# Patient Record
Sex: FEMALE | Race: WHITE | NOT HISPANIC OR LATINO | Employment: OTHER | URBAN - METROPOLITAN AREA
[De-identification: names, ages, dates, MRNs, and addresses within clinical notes are randomized per-mention and may not be internally consistent; named-entity substitution may affect disease eponyms.]

---

## 2018-08-13 ENCOUNTER — TELEPHONE (OUTPATIENT)
Dept: BARIATRICS/WEIGHT MGMT | Facility: CLINIC | Age: 73
End: 2018-08-13

## 2018-12-27 ENCOUNTER — CONVERSION ENCOUNTER (OUTPATIENT)
Dept: SURGERY | Facility: HOSPITAL | Age: 73
End: 2018-12-27

## 2019-01-13 ENCOUNTER — HOSPITAL ENCOUNTER (OUTPATIENT)
Dept: URGENT CARE | Facility: CLINIC | Age: 74
Discharge: HOME | End: 2019-01-13

## 2019-01-14 LAB — GLUCOSE BLD-MCNC: 94 MG/DL (ref 65–99)

## 2019-01-31 ENCOUNTER — HOSPITAL ENCOUNTER (OUTPATIENT)
Dept: OTHER | Facility: HOSPITAL | Age: 74
Setting detail: RECURRING SERIES
Discharge: HOME | End: 2019-04-25
Attending: NURSE PRACTITIONER

## 2019-03-21 ENCOUNTER — OFFICE VISIT CONVERTED (OUTPATIENT)
Dept: ORTHOPEDIC SURGERY | Facility: CLINIC | Age: 74
End: 2019-03-21
Attending: ORTHOPAEDIC SURGERY

## 2019-04-15 ENCOUNTER — HOSPITAL ENCOUNTER (OUTPATIENT)
Dept: OTHER | Facility: HOSPITAL | Age: 74
Discharge: HOME | End: 2019-04-15
Attending: INTERNAL MEDICINE

## 2019-04-15 LAB
ALBUMIN SERPL-MCNC: 4.2 G/DL (ref 3.5–5)
ALBUMIN/GLOB SERPL: 1.4 {RATIO} (ref 1.4–2.6)
ALP SERPL-CCNC: 96 U/L (ref 43–160)
ALT SERPL-CCNC: 22 U/L (ref 10–40)
ANION GAP SERPL CALC-SCNC: 20 MMOL/L (ref 8–19)
AST SERPL-CCNC: 40 U/L (ref 15–50)
BASOPHILS # BLD AUTO: 0.02 10*3/UL (ref 0–0.2)
BASOPHILS NFR BLD AUTO: 0.3 % (ref 0–3)
BILIRUB SERPL-MCNC: 0.19 MG/DL (ref 0.2–1.3)
BUN SERPL-MCNC: 13 MG/DL (ref 5–25)
BUN/CREAT SERPL: 15 {RATIO} (ref 6–20)
CALCIUM SERPL-MCNC: 9.6 MG/DL (ref 8.7–10.4)
CHLORIDE SERPL-SCNC: 97 MMOL/L (ref 99–111)
CHOLEST SERPL-MCNC: 187 MG/DL (ref 107–200)
CHOLEST/HDLC SERPL: 5.2 {RATIO} (ref 3–6)
CONV ABS IMM GRAN: 0.02 10*3/UL (ref 0–0.2)
CONV CO2: 29 MMOL/L (ref 22–32)
CONV CREATININE URINE, RANDOM: 103.1 MG/DL (ref 10–300)
CONV IMMATURE GRAN: 0.3 % (ref 0–1.8)
CONV MICROALBUM.,U,RANDOM: 287 MG/L (ref 0–20)
CONV TOTAL PROTEIN: 7.3 G/DL (ref 6.3–8.2)
CREAT UR-MCNC: 0.87 MG/DL (ref 0.5–0.9)
DEPRECATED RDW RBC AUTO: 48.4 FL (ref 36.4–46.3)
EOSINOPHIL # BLD AUTO: 0.17 10*3/UL (ref 0–0.7)
EOSINOPHIL # BLD AUTO: 2.4 % (ref 0–7)
ERYTHROCYTE [DISTWIDTH] IN BLOOD BY AUTOMATED COUNT: 14.9 % (ref 11.7–14.4)
EST. AVERAGE GLUCOSE BLD GHB EST-MCNC: 120 MG/DL
GFR SERPLBLD BASED ON 1.73 SQ M-ARVRAT: >60 ML/MIN/{1.73_M2}
GLOBULIN UR ELPH-MCNC: 3.1 G/DL (ref 2–3.5)
GLUCOSE SERPL-MCNC: 93 MG/DL (ref 65–99)
HBA1C MFR BLD: 13 G/DL (ref 12–16)
HBA1C MFR BLD: 5.8 % (ref 3.5–5.7)
HCT VFR BLD AUTO: 40.8 % (ref 37–47)
HDLC SERPL-MCNC: 36 MG/DL (ref 40–60)
LDLC SERPL CALC-MCNC: 121 MG/DL (ref 70–100)
LYMPHOCYTES # BLD AUTO: 2.36 10*3/UL (ref 1–5)
MCH RBC QN AUTO: 28.3 PG (ref 27–31)
MCHC RBC AUTO-ENTMCNC: 31.9 G/DL (ref 33–37)
MCV RBC AUTO: 88.9 FL (ref 81–99)
MICROALBUMIN/CREAT UR: 278.4 MG/G{CRE} (ref 0–35)
MONOCYTES # BLD AUTO: 0.72 10*3/UL (ref 0.2–1.2)
MONOCYTES NFR BLD AUTO: 10.2 % (ref 3–10)
NEUTROPHILS # BLD AUTO: 3.75 10*3/UL (ref 2–8)
NEUTROPHILS NFR BLD AUTO: 53.3 % (ref 30–85)
NRBC CBCN: 0 % (ref 0–0.7)
OSMOLALITY SERPL CALC.SUM OF ELEC: 292 MOSM/KG (ref 273–304)
PLATELET # BLD AUTO: 324 10*3/UL (ref 130–400)
PMV BLD AUTO: 9.7 FL (ref 9.4–12.3)
POTASSIUM SERPL-SCNC: 4.5 MMOL/L (ref 3.5–5.3)
RBC # BLD AUTO: 4.59 10*6/UL (ref 4.2–5.4)
SODIUM SERPL-SCNC: 141 MMOL/L (ref 135–147)
TRIGL SERPL-MCNC: 152 MG/DL (ref 40–150)
VARIANT LYMPHS NFR BLD MANUAL: 33.5 % (ref 20–45)
VLDLC SERPL-MCNC: 30 MG/DL (ref 5–37)
WBC # BLD AUTO: 7.04 10*3/UL (ref 4.8–10.8)

## 2019-04-23 ENCOUNTER — HOSPITAL ENCOUNTER (OUTPATIENT)
Dept: GENERAL RADIOLOGY | Facility: HOSPITAL | Age: 74
Discharge: HOME | End: 2019-04-23
Attending: INTERNAL MEDICINE

## 2019-06-11 ENCOUNTER — HOSPITAL ENCOUNTER (OUTPATIENT)
Dept: GENERAL RADIOLOGY | Facility: HOSPITAL | Age: 74
Discharge: HOME | End: 2019-06-11
Attending: INTERNAL MEDICINE

## 2019-06-24 ENCOUNTER — HOSPITAL ENCOUNTER (OUTPATIENT)
Dept: OTHER | Facility: HOSPITAL | Age: 74
Discharge: HOME | End: 2019-06-24
Attending: INTERNAL MEDICINE

## 2019-06-24 LAB
ALBUMIN SERPL-MCNC: 4.1 G/DL (ref 3.5–5)
ALBUMIN/GLOB SERPL: 1.3 {RATIO} (ref 1.4–2.6)
ALP SERPL-CCNC: 81 U/L (ref 43–160)
ALT SERPL-CCNC: 17 U/L (ref 10–40)
ANION GAP SERPL CALC-SCNC: 21 MMOL/L (ref 8–19)
APPEARANCE UR: ABNORMAL
AST SERPL-CCNC: 34 U/L (ref 15–50)
BASOPHILS # BLD AUTO: 0.03 10*3/UL (ref 0–0.2)
BASOPHILS NFR BLD AUTO: 0.5 % (ref 0–3)
BILIRUB SERPL-MCNC: 0.24 MG/DL (ref 0.2–1.3)
BILIRUB UR QL: NEGATIVE
BUN SERPL-MCNC: 12 MG/DL (ref 5–25)
BUN/CREAT SERPL: 13 {RATIO} (ref 6–20)
CALCIUM SERPL-MCNC: 9.6 MG/DL (ref 8.7–10.4)
CHLORIDE SERPL-SCNC: 102 MMOL/L (ref 99–111)
COLOR UR: YELLOW
CONV ABS IMM GRAN: 0.02 10*3/UL (ref 0–0.2)
CONV BACTERIA: ABNORMAL
CONV CO2: 23 MMOL/L (ref 22–32)
CONV COLLECTION SOURCE (UA): ABNORMAL
CONV IMMATURE GRAN: 0.3 % (ref 0–1.8)
CONV TOTAL PROTEIN: 7.2 G/DL (ref 6.3–8.2)
CONV UROBILINOGEN IN URINE BY AUTOMATED TEST STRIP: 0.2 {EHRLICHU}/DL (ref 0.1–1)
CREAT UR-MCNC: 0.92 MG/DL (ref 0.5–0.9)
DEPRECATED RDW RBC AUTO: 57.4 FL (ref 36.4–46.3)
EOSINOPHIL # BLD AUTO: 0.17 10*3/UL (ref 0–0.7)
EOSINOPHIL # BLD AUTO: 2.9 % (ref 0–7)
ERYTHROCYTE [DISTWIDTH] IN BLOOD BY AUTOMATED COUNT: 16.8 % (ref 11.7–14.4)
EST. AVERAGE GLUCOSE BLD GHB EST-MCNC: 114 MG/DL
GFR SERPLBLD BASED ON 1.73 SQ M-ARVRAT: >60 ML/MIN/{1.73_M2}
GLOBULIN UR ELPH-MCNC: 3.1 G/DL (ref 2–3.5)
GLUCOSE SERPL-MCNC: 81 MG/DL (ref 65–99)
GLUCOSE UR QL: NEGATIVE MG/DL
HBA1C MFR BLD: 12.7 G/DL (ref 12–16)
HBA1C MFR BLD: 5.6 % (ref 3.5–5.7)
HCT VFR BLD AUTO: 40.9 % (ref 37–47)
HGB UR QL STRIP: NEGATIVE
KETONES UR QL STRIP: ABNORMAL MG/DL
LEUKOCYTE ESTERASE UR QL STRIP: ABNORMAL
LYMPHOCYTES # BLD AUTO: 2.23 10*3/UL (ref 1–5)
MCH RBC QN AUTO: 28.5 PG (ref 27–31)
MCHC RBC AUTO-ENTMCNC: 31.1 G/DL (ref 33–37)
MCV RBC AUTO: 91.9 FL (ref 81–99)
MONOCYTES # BLD AUTO: 0.56 10*3/UL (ref 0.2–1.2)
MONOCYTES NFR BLD AUTO: 9.5 % (ref 3–10)
NEUTROPHILS # BLD AUTO: 2.88 10*3/UL (ref 2–8)
NEUTROPHILS NFR BLD AUTO: 48.9 % (ref 30–85)
NITRITE UR QL STRIP: POSITIVE
NRBC CBCN: 0 % (ref 0–0.7)
OSMOLALITY SERPL CALC.SUM OF ELEC: 293 MOSM/KG (ref 273–304)
PH UR STRIP.AUTO: 6 [PH] (ref 5–8)
PLATELET # BLD AUTO: 299 10*3/UL (ref 130–400)
PMV BLD AUTO: 10.4 FL (ref 9.4–12.3)
POTASSIUM SERPL-SCNC: 4.2 MMOL/L (ref 3.5–5.3)
PROT UR QL: NEGATIVE MG/DL
RBC # BLD AUTO: 4.45 10*6/UL (ref 4.2–5.4)
RBC #/AREA URNS HPF: ABNORMAL /[HPF]
SODIUM SERPL-SCNC: 142 MMOL/L (ref 135–147)
SP GR UR: 1.02 (ref 1–1.03)
SQUAMOUS SPT QL MICRO: ABNORMAL /[HPF]
VARIANT LYMPHS NFR BLD MANUAL: 37.9 % (ref 20–45)
WBC # BLD AUTO: 5.89 10*3/UL (ref 4.8–10.8)
WBC #/AREA URNS HPF: ABNORMAL /[HPF]

## 2019-06-27 LAB
AMOXICILLIN+CLAV SUSC ISLT: <=2
AMPICILLIN SUSC ISLT: 4
AMPICILLIN+SULBAC SUSC ISLT: <=2
BACTERIA UR CULT: ABNORMAL
CEFAZOLIN SUSC ISLT: <=4
CEFEPIME SUSC ISLT: <=1
CEFTAZIDIME SUSC ISLT: <=1
CEFTRIAXONE SUSC ISLT: <=1
CEFUROXIME ORAL SUSC ISLT: 4
CEFUROXIME PARENTER SUSC ISLT: 4
CIPROFLOXACIN SUSC ISLT: <=0.25
ERTAPENEM SUSC ISLT: <=0.5
GENTAMICIN SUSC ISLT: <=1
LEVOFLOXACIN SUSC ISLT: <=0.12
NITROFURANTOIN SUSC ISLT: <=16
TETRACYCLINE SUSC ISLT: <=1
TMP SMX SUSC ISLT: <=20
TOBRAMYCIN SUSC ISLT: <=1

## 2019-08-27 ENCOUNTER — HOSPITAL ENCOUNTER (OUTPATIENT)
Dept: OTHER | Facility: HOSPITAL | Age: 74
Discharge: HOME | End: 2019-08-27
Attending: INTERNAL MEDICINE

## 2019-08-27 LAB
ALBUMIN SERPL-MCNC: 4.4 G/DL (ref 3.5–5)
ALBUMIN/GLOB SERPL: 1.8 {RATIO} (ref 1.4–2.6)
ALP SERPL-CCNC: 68 U/L (ref 43–160)
ALT SERPL-CCNC: 15 U/L (ref 10–40)
ANION GAP SERPL CALC-SCNC: 20 MMOL/L (ref 8–19)
AST SERPL-CCNC: 31 U/L (ref 15–50)
BASOPHILS # BLD AUTO: 0.03 10*3/UL (ref 0–0.2)
BASOPHILS NFR BLD AUTO: 0.4 % (ref 0–3)
BILIRUB SERPL-MCNC: 0.33 MG/DL (ref 0.2–1.3)
BUN SERPL-MCNC: 13 MG/DL (ref 5–25)
BUN/CREAT SERPL: 15 {RATIO} (ref 6–20)
CALCIUM SERPL-MCNC: 9.2 MG/DL (ref 8.7–10.4)
CHLORIDE SERPL-SCNC: 103 MMOL/L (ref 99–111)
CONV ABS IMM GRAN: 0.02 10*3/UL (ref 0–0.2)
CONV CO2: 25 MMOL/L (ref 22–32)
CONV IMMATURE GRAN: 0.3 % (ref 0–1.8)
CONV TOTAL PROTEIN: 6.9 G/DL (ref 6.3–8.2)
CREAT UR-MCNC: 0.86 MG/DL (ref 0.5–0.9)
DEPRECATED RDW RBC AUTO: 46.6 FL (ref 36.4–46.3)
EOSINOPHIL # BLD AUTO: 0.16 10*3/UL (ref 0–0.7)
EOSINOPHIL # BLD AUTO: 2.2 % (ref 0–7)
ERYTHROCYTE [DISTWIDTH] IN BLOOD BY AUTOMATED COUNT: 13.8 % (ref 11.7–14.4)
EST. AVERAGE GLUCOSE BLD GHB EST-MCNC: 108 MG/DL
GFR SERPLBLD BASED ON 1.73 SQ M-ARVRAT: >60 ML/MIN/{1.73_M2}
GLOBULIN UR ELPH-MCNC: 2.5 G/DL (ref 2–3.5)
GLUCOSE SERPL-MCNC: 95 MG/DL (ref 65–99)
HBA1C MFR BLD: 5.4 % (ref 3.5–5.7)
HCT VFR BLD AUTO: 42.1 % (ref 37–47)
HGB BLD-MCNC: 13.8 G/DL (ref 12–16)
LYMPHOCYTES # BLD AUTO: 2.53 10*3/UL (ref 1–5)
LYMPHOCYTES NFR BLD AUTO: 34.8 % (ref 20–45)
MCH RBC QN AUTO: 30.5 PG (ref 27–31)
MCHC RBC AUTO-ENTMCNC: 32.8 G/DL (ref 33–37)
MCV RBC AUTO: 93.1 FL (ref 81–99)
MONOCYTES # BLD AUTO: 0.66 10*3/UL (ref 0.2–1.2)
MONOCYTES NFR BLD AUTO: 9.1 % (ref 3–10)
NEUTROPHILS # BLD AUTO: 3.88 10*3/UL (ref 2–8)
NEUTROPHILS NFR BLD AUTO: 53.2 % (ref 30–85)
NRBC CBCN: 0 % (ref 0–0.7)
OSMOLALITY SERPL CALC.SUM OF ELEC: 298 MOSM/KG (ref 273–304)
PLATELET # BLD AUTO: 278 10*3/UL (ref 130–400)
PMV BLD AUTO: 9.6 FL (ref 9.4–12.3)
POTASSIUM SERPL-SCNC: 4.1 MMOL/L (ref 3.5–5.3)
RBC # BLD AUTO: 4.52 10*6/UL (ref 4.2–5.4)
SODIUM SERPL-SCNC: 144 MMOL/L (ref 135–147)
WBC # BLD AUTO: 7.28 10*3/UL (ref 4.8–10.8)

## 2019-10-10 ENCOUNTER — HOSPITAL ENCOUNTER (OUTPATIENT)
Dept: GENERAL RADIOLOGY | Facility: HOSPITAL | Age: 74
Discharge: HOME | End: 2019-10-10
Attending: OTOLARYNGOLOGY

## 2019-10-10 LAB
ANION GAP SERPL CALC-SCNC: 19 MMOL/L (ref 8–19)
APTT BLD: 22.4 S (ref 22.2–34.2)
BASOPHILS # BLD AUTO: 0.02 10*3/UL (ref 0–0.2)
BASOPHILS NFR BLD AUTO: 0.3 % (ref 0–3)
BUN SERPL-MCNC: 14 MG/DL (ref 5–25)
BUN/CREAT SERPL: 15 {RATIO} (ref 6–20)
CALCIUM SERPL-MCNC: 9.6 MG/DL (ref 8.7–10.4)
CHLORIDE SERPL-SCNC: 102 MMOL/L (ref 99–111)
CONV ABS IMM GRAN: 0.03 10*3/UL (ref 0–0.2)
CONV CO2: 26 MMOL/L (ref 22–32)
CONV IMMATURE GRAN: 0.4 % (ref 0–1.8)
CREAT UR-MCNC: 0.92 MG/DL (ref 0.5–0.9)
DEPRECATED RDW RBC AUTO: 43.3 FL (ref 36.4–46.3)
EOSINOPHIL # BLD AUTO: 0.16 10*3/UL (ref 0–0.7)
EOSINOPHIL # BLD AUTO: 2.3 % (ref 0–7)
ERYTHROCYTE [DISTWIDTH] IN BLOOD BY AUTOMATED COUNT: 12.6 % (ref 11.7–14.4)
GFR SERPLBLD BASED ON 1.73 SQ M-ARVRAT: >60 ML/MIN/{1.73_M2}
GLUCOSE SERPL-MCNC: 95 MG/DL (ref 65–99)
HCT VFR BLD AUTO: 40.8 % (ref 37–47)
HGB BLD-MCNC: 13.6 G/DL (ref 12–16)
INR PPP: 0.95 (ref 2–3)
LYMPHOCYTES # BLD AUTO: 2.36 10*3/UL (ref 1–5)
LYMPHOCYTES NFR BLD AUTO: 34.2 % (ref 20–45)
MCH RBC QN AUTO: 31.1 PG (ref 27–31)
MCHC RBC AUTO-ENTMCNC: 33.3 G/DL (ref 33–37)
MCV RBC AUTO: 93.2 FL (ref 81–99)
MONOCYTES # BLD AUTO: 0.66 10*3/UL (ref 0.2–1.2)
MONOCYTES NFR BLD AUTO: 9.6 % (ref 3–10)
NEUTROPHILS # BLD AUTO: 3.68 10*3/UL (ref 2–8)
NEUTROPHILS NFR BLD AUTO: 53.2 % (ref 30–85)
NRBC CBCN: 0 % (ref 0–0.7)
OSMOLALITY SERPL CALC.SUM OF ELEC: 296 MOSM/KG (ref 273–304)
PLATELET # BLD AUTO: 261 10*3/UL (ref 130–400)
PMV BLD AUTO: 9.4 FL (ref 9.4–12.3)
POTASSIUM SERPL-SCNC: 3.8 MMOL/L (ref 3.5–5.3)
PROTHROMBIN TIME: 10.3 S (ref 9.4–12)
RBC # BLD AUTO: 4.38 10*6/UL (ref 4.2–5.4)
SODIUM SERPL-SCNC: 143 MMOL/L (ref 135–147)
WBC # BLD AUTO: 6.91 10*3/UL (ref 4.8–10.8)

## 2019-10-15 ENCOUNTER — HOSPITAL ENCOUNTER (OUTPATIENT)
Dept: PERIOP | Facility: HOSPITAL | Age: 74
Setting detail: HOSPITAL OUTPATIENT SURGERY
Discharge: HOME | End: 2019-10-15
Attending: OTOLARYNGOLOGY

## 2019-10-15 LAB
GLUCOSE BLD-MCNC: 106 MG/DL (ref 65–99)
GLUCOSE BLD-MCNC: 83 MG/DL (ref 65–99)

## 2019-11-29 ENCOUNTER — HOSPITAL ENCOUNTER (OUTPATIENT)
Dept: OTHER | Facility: HOSPITAL | Age: 74
Discharge: HOME | End: 2019-11-29
Attending: INTERNAL MEDICINE

## 2019-11-29 LAB
ALBUMIN SERPL-MCNC: 4.3 G/DL (ref 3.5–5)
ALBUMIN/GLOB SERPL: 1.5 {RATIO} (ref 1.4–2.6)
ALP SERPL-CCNC: 75 U/L (ref 43–160)
ALT SERPL-CCNC: 10 U/L (ref 10–40)
ANION GAP SERPL CALC-SCNC: 18 MMOL/L (ref 8–19)
AST SERPL-CCNC: 28 U/L (ref 15–50)
BASOPHILS # BLD AUTO: 0.03 10*3/UL (ref 0–0.2)
BASOPHILS NFR BLD AUTO: 0.4 % (ref 0–3)
BILIRUB SERPL-MCNC: 0.31 MG/DL (ref 0.2–1.3)
BUN SERPL-MCNC: 12 MG/DL (ref 5–25)
BUN/CREAT SERPL: 14 {RATIO} (ref 6–20)
CALCIUM SERPL-MCNC: 9.7 MG/DL (ref 8.7–10.4)
CHLORIDE SERPL-SCNC: 101 MMOL/L (ref 99–111)
CHOLEST SERPL-MCNC: 148 MG/DL (ref 107–200)
CHOLEST/HDLC SERPL: 3.8 {RATIO} (ref 3–6)
CONV ABS IMM GRAN: 0.03 10*3/UL (ref 0–0.2)
CONV CO2: 28 MMOL/L (ref 22–32)
CONV CREATININE URINE, RANDOM: 243.3 MG/DL (ref 10–300)
CONV IMMATURE GRAN: 0.4 % (ref 0–1.8)
CONV MICROALBUM.,U,RANDOM: 96.1 MG/L (ref 0–20)
CONV TOTAL PROTEIN: 7.2 G/DL (ref 6.3–8.2)
CREAT UR-MCNC: 0.84 MG/DL (ref 0.5–0.9)
DEPRECATED RDW RBC AUTO: 45.3 FL (ref 36.4–46.3)
EOSINOPHIL # BLD AUTO: 0.16 10*3/UL (ref 0–0.7)
EOSINOPHIL # BLD AUTO: 2.3 % (ref 0–7)
ERYTHROCYTE [DISTWIDTH] IN BLOOD BY AUTOMATED COUNT: 13.2 % (ref 11.7–14.4)
EST. AVERAGE GLUCOSE BLD GHB EST-MCNC: 105 MG/DL
GFR SERPLBLD BASED ON 1.73 SQ M-ARVRAT: >60 ML/MIN/{1.73_M2}
GLOBULIN UR ELPH-MCNC: 2.9 G/DL (ref 2–3.5)
GLUCOSE SERPL-MCNC: 83 MG/DL (ref 65–99)
HBA1C MFR BLD: 5.3 % (ref 3.5–5.7)
HCT VFR BLD AUTO: 42 % (ref 37–47)
HDLC SERPL-MCNC: 39 MG/DL (ref 40–60)
HGB BLD-MCNC: 13.6 G/DL (ref 12–16)
LDLC SERPL CALC-MCNC: 63 MG/DL (ref 70–100)
LYMPHOCYTES # BLD AUTO: 2.25 10*3/UL (ref 1–5)
LYMPHOCYTES NFR BLD AUTO: 32.8 % (ref 20–45)
MCH RBC QN AUTO: 30.1 PG (ref 27–31)
MCHC RBC AUTO-ENTMCNC: 32.4 G/DL (ref 33–37)
MCV RBC AUTO: 92.9 FL (ref 81–99)
MICROALBUMIN/CREAT UR: 39.5 MG/G{CRE} (ref 0–35)
MONOCYTES # BLD AUTO: 0.53 10*3/UL (ref 0.2–1.2)
MONOCYTES NFR BLD AUTO: 7.7 % (ref 3–10)
NEUTROPHILS # BLD AUTO: 3.85 10*3/UL (ref 2–8)
NEUTROPHILS NFR BLD AUTO: 56.4 % (ref 30–85)
NRBC CBCN: 0 % (ref 0–0.7)
OSMOLALITY SERPL CALC.SUM OF ELEC: 295 MOSM/KG (ref 273–304)
PLATELET # BLD AUTO: 256 10*3/UL (ref 130–400)
PMV BLD AUTO: 9.5 FL (ref 9.4–12.3)
POTASSIUM SERPL-SCNC: 4.3 MMOL/L (ref 3.5–5.3)
RBC # BLD AUTO: 4.52 10*6/UL (ref 4.2–5.4)
SODIUM SERPL-SCNC: 143 MMOL/L (ref 135–147)
TRIGL SERPL-MCNC: 231 MG/DL (ref 40–150)
VLDLC SERPL-MCNC: 46 MG/DL (ref 5–37)
WBC # BLD AUTO: 6.85 10*3/UL (ref 4.8–10.8)

## 2019-12-06 ENCOUNTER — OFFICE VISIT CONVERTED (OUTPATIENT)
Dept: CARDIOLOGY | Facility: CLINIC | Age: 74
End: 2019-12-06
Attending: SPECIALIST

## 2020-01-08 ENCOUNTER — HOSPITAL ENCOUNTER (OUTPATIENT)
Dept: OTHER | Facility: HOSPITAL | Age: 75
Setting detail: RECURRING SERIES
Discharge: HOME | End: 2020-02-24
Attending: INTERNAL MEDICINE

## 2020-01-14 ENCOUNTER — CONVERSION ENCOUNTER (OUTPATIENT)
Dept: CARDIOLOGY | Facility: CLINIC | Age: 75
End: 2020-01-14
Attending: SPECIALIST

## 2020-04-27 ENCOUNTER — HOSPITAL ENCOUNTER (OUTPATIENT)
Dept: LAB | Facility: HOSPITAL | Age: 75
Discharge: HOME | End: 2020-04-27
Attending: INTERNAL MEDICINE

## 2020-04-27 LAB
ALBUMIN SERPL-MCNC: 4.1 G/DL (ref 3.5–5)
ALBUMIN/GLOB SERPL: 1.2 {RATIO} (ref 1.4–2.6)
ALP SERPL-CCNC: 84 U/L (ref 43–160)
ALT SERPL-CCNC: 18 U/L (ref 10–40)
AMYLASE SERPL-CCNC: 53 U/L (ref 30–150)
ANION GAP SERPL CALC-SCNC: 23 MMOL/L (ref 8–19)
APPEARANCE UR: ABNORMAL
AST SERPL-CCNC: 29 U/L (ref 15–50)
BASOPHILS # BLD AUTO: 0.02 10*3/UL (ref 0–0.2)
BASOPHILS NFR BLD AUTO: 0.3 % (ref 0–3)
BILIRUB SERPL-MCNC: 0.19 MG/DL (ref 0.2–1.3)
BILIRUB UR QL: NEGATIVE
BUN SERPL-MCNC: 13 MG/DL (ref 5–25)
BUN/CREAT SERPL: 14 {RATIO} (ref 6–20)
CALCIUM SERPL-MCNC: 9.6 MG/DL (ref 8.7–10.4)
CHLORIDE SERPL-SCNC: 100 MMOL/L (ref 99–111)
COLOR UR: YELLOW
CONV ABS IMM GRAN: 0.04 10*3/UL (ref 0–0.2)
CONV BACTERIA: ABNORMAL
CONV CO2: 27 MMOL/L (ref 22–32)
CONV COLLECTION SOURCE (UA): ABNORMAL
CONV HYALINE CASTS IN URINE MICRO: ABNORMAL /[LPF]
CONV IMMATURE GRAN: 0.6 % (ref 0–1.8)
CONV TOTAL PROTEIN: 7.4 G/DL (ref 6.3–8.2)
CONV UROBILINOGEN IN URINE BY AUTOMATED TEST STRIP: 1 {EHRLICHU}/DL (ref 0.1–1)
CREAT UR-MCNC: 0.96 MG/DL (ref 0.5–0.9)
DEPRECATED RDW RBC AUTO: 45.8 FL (ref 36.4–46.3)
EOSINOPHIL # BLD AUTO: 0.1 10*3/UL (ref 0–0.7)
EOSINOPHIL # BLD AUTO: 1.4 % (ref 0–7)
ERYTHROCYTE [DISTWIDTH] IN BLOOD BY AUTOMATED COUNT: 13.9 % (ref 11.7–14.4)
GFR SERPLBLD BASED ON 1.73 SQ M-ARVRAT: 57 ML/MIN/{1.73_M2}
GLOBULIN UR ELPH-MCNC: 3.3 G/DL (ref 2–3.5)
GLUCOSE SERPL-MCNC: 163 MG/DL (ref 65–99)
GLUCOSE UR QL: NEGATIVE MG/DL
HCT VFR BLD AUTO: 44.6 % (ref 37–47)
HGB BLD-MCNC: 13.9 G/DL (ref 12–16)
HGB UR QL STRIP: NEGATIVE
KETONES UR QL STRIP: ABNORMAL MG/DL
LEUKOCYTE ESTERASE UR QL STRIP: ABNORMAL
LIPASE SERPL-CCNC: 64 U/L (ref 5–51)
LYMPHOCYTES # BLD AUTO: 2.37 10*3/UL (ref 1–5)
LYMPHOCYTES NFR BLD AUTO: 34 % (ref 20–45)
MCH RBC QN AUTO: 28 PG (ref 27–31)
MCHC RBC AUTO-ENTMCNC: 31.2 G/DL (ref 33–37)
MCV RBC AUTO: 89.9 FL (ref 81–99)
MONOCYTES # BLD AUTO: 0.45 10*3/UL (ref 0.2–1.2)
MONOCYTES NFR BLD AUTO: 6.4 % (ref 3–10)
NEUTROPHILS # BLD AUTO: 4 10*3/UL (ref 2–8)
NEUTROPHILS NFR BLD AUTO: 57.3 % (ref 30–85)
NITRITE UR QL STRIP: POSITIVE
NRBC CBCN: 0 % (ref 0–0.7)
OSMOLALITY SERPL CALC.SUM OF ELEC: 304 MOSM/KG (ref 273–304)
PH UR STRIP.AUTO: 7 [PH] (ref 5–8)
PLATELET # BLD AUTO: 291 10*3/UL (ref 130–400)
PMV BLD AUTO: 10 FL (ref 9.4–12.3)
POTASSIUM SERPL-SCNC: 5 MMOL/L (ref 3.5–5.3)
PROT UR QL: 30 MG/DL
RBC # BLD AUTO: 4.96 10*6/UL (ref 4.2–5.4)
RBC #/AREA URNS HPF: ABNORMAL /[HPF]
SODIUM SERPL-SCNC: 145 MMOL/L (ref 135–147)
SP GR UR: 1.02 (ref 1–1.03)
SQUAMOUS SPT QL MICRO: ABNORMAL /[HPF]
WBC # BLD AUTO: 6.98 10*3/UL (ref 4.8–10.8)
WBC #/AREA URNS HPF: ABNORMAL /[HPF]

## 2020-04-29 LAB
AMOXICILLIN+CLAV SUSC ISLT: 4
AMPICILLIN SUSC ISLT: 8
AMPICILLIN+SULBAC SUSC ISLT: 4
BACTERIA UR CULT: ABNORMAL
CEFAZOLIN SUSC ISLT: <=4
CEFEPIME SUSC ISLT: <=1
CEFTAZIDIME SUSC ISLT: <=1
CEFTRIAXONE SUSC ISLT: <=1
CEFUROXIME ORAL SUSC ISLT: 16
CEFUROXIME PARENTER SUSC ISLT: 16
CIPROFLOXACIN SUSC ISLT: <=0.25
ERTAPENEM SUSC ISLT: <=0.5
GENTAMICIN SUSC ISLT: <=1
LEVOFLOXACIN SUSC ISLT: <=0.12
NITROFURANTOIN SUSC ISLT: <=16
TETRACYCLINE SUSC ISLT: <=1
TMP SMX SUSC ISLT: <=20
TOBRAMYCIN SUSC ISLT: <=1

## 2020-05-04 ENCOUNTER — HOSPITAL ENCOUNTER (OUTPATIENT)
Dept: GENERAL RADIOLOGY | Facility: HOSPITAL | Age: 75
Discharge: HOME | End: 2020-05-04
Attending: INTERNAL MEDICINE

## 2020-07-14 ENCOUNTER — OFFICE VISIT CONVERTED (OUTPATIENT)
Dept: CARDIOLOGY | Facility: CLINIC | Age: 75
End: 2020-07-14
Attending: SPECIALIST

## 2020-07-14 ENCOUNTER — CONVERSION ENCOUNTER (OUTPATIENT)
Dept: OTHER | Facility: HOSPITAL | Age: 75
End: 2020-07-14

## 2020-07-23 ENCOUNTER — TELEPHONE (OUTPATIENT)
Dept: CARDIOLOGY | Facility: CLINIC | Age: 75
End: 2020-07-23

## 2020-07-23 ENCOUNTER — TRANSCRIBE ORDERS (OUTPATIENT)
Dept: CARDIOLOGY | Facility: CLINIC | Age: 75
End: 2020-07-23

## 2020-07-23 DIAGNOSIS — R07.9 CHEST PAIN, UNSPECIFIED TYPE: Primary | ICD-10-CM

## 2020-07-23 NOTE — TELEPHONE ENCOUNTER
Dr Karma Snowden is requesting a coronary CT.  Can you please let me know metoprolol instructions?    Current Med list as of 7/14:   Colestipol - 1mg  daily   Ropinirole - 2mg  t.i.d.   Metoprolol - 25mg  b.i.d.   Furosemide - 40 mg  daily   Gabapentin - 600mg t.i.d.   Simvasatatin - 40mg  daily   Duloxetine - 60mg  daily   Potassium - 10 mEq  Daily    Vitals:   7/14/20 /66 HR 68    Thank you  Donna URIAS

## 2020-08-02 RX ORDER — GABAPENTIN 600 MG/1
600 TABLET ORAL 3 TIMES DAILY
COMMUNITY
End: 2021-07-21 | Stop reason: SDUPTHER

## 2020-08-05 ENCOUNTER — HOSPITAL ENCOUNTER (OUTPATIENT)
Dept: CT IMAGING | Facility: HOSPITAL | Age: 75
Discharge: HOME | End: 2020-08-05
Admitting: SPECIALIST

## 2020-08-05 VITALS
RESPIRATION RATE: 18 BRPM | TEMPERATURE: 97.8 F | DIASTOLIC BLOOD PRESSURE: 74 MMHG | SYSTOLIC BLOOD PRESSURE: 119 MMHG | HEART RATE: 66 BPM | OXYGEN SATURATION: 98 %

## 2020-08-05 DIAGNOSIS — R07.9 CHEST PAIN, UNSPECIFIED TYPE: ICD-10-CM

## 2020-08-05 LAB — CREAT BLDA-MCNC: 0.9 MG/DL (ref 0.6–1.3)

## 2020-08-05 PROCEDURE — 0 IOPAMIDOL PER 1 ML: Performed by: SPECIALIST

## 2020-08-05 PROCEDURE — 75574 CT ANGIO HRT W/3D IMAGE: CPT | Performed by: INTERNAL MEDICINE

## 2020-08-05 PROCEDURE — 82565 ASSAY OF CREATININE: CPT

## 2020-08-05 PROCEDURE — 93010 ELECTROCARDIOGRAM REPORT: CPT | Performed by: INTERNAL MEDICINE

## 2020-08-05 PROCEDURE — 75574 CT ANGIO HRT W/3D IMAGE: CPT

## 2020-08-05 PROCEDURE — 93005 ELECTROCARDIOGRAM TRACING: CPT | Performed by: SPECIALIST

## 2020-08-05 RX ADMIN — IOPAMIDOL 85 ML: 755 INJECTION, SOLUTION INTRAVENOUS at 11:55

## 2020-08-07 ENCOUNTER — DOCUMENTATION (OUTPATIENT)
Dept: CARDIOLOGY | Facility: CLINIC | Age: 75
End: 2020-08-07

## 2020-08-07 NOTE — PROGRESS NOTES
Cardiac CTA with morphology  8/5/20  Reason for the exam: chest pain    Calcium score is 9 Agatston units.  This is between the 1-25 percentile for women between the ages of 70-74 years old.    Heart rate 53 bpm.  Left ventricular end-diastolic volume 83 mL.  Left ventricular end-systolic volume 21 mL.  Ejection fraction 75%.  Stroke volume 62 mL.  Cardiac output 3.3 mL/m    The right atrium is normal in size.  The right ventricle is normal in size.  There is grossly normal right ventricular systolic function.  The pulmonary artery is normal in size.  There are 4 pulmonary veins which enter the left atrium in their expected location.  The left atrial appendage was visualized and is without thrombus.  The intra-atrial septum appeared to be intact.  The left ventricle is normal in size with normal systolic function. There is a sigmoid septum.  There was no evidence of a left ventricular thrombus.  The intraventricular septum appeared to be intact.  The mitral valve had mitral annular calcifications.  The aortic valve was trileaflet and appears structurally and functionally normal.  The pulmonic valve appeared structurally normal.  The tricuspid valve appeared structurally normal.  There was no pericardial effusion.  The pericardium appeared normal.    The left main coronary artery came off the left coronary cusp in its anticipated location.  It bifurcated into the left anterior descending artery and the circumflex coronary artery.  There was no evidence of atherosclerotic disease of the left main coronary artery.  Left anterior descending artery wraps around the apex of the heart.  There is no evidence of atherosclerotic disease.  The circumflex artery is the nondominant vessel with no evidence of atherosclerotic disease.  The right coronary artery is the dominant vessel with no evidence of atherosclerotic disease.    Conclusions:  1.  Normal coronary artery anatomy without evidence of atherosclerotic disease.   Calcium score is 9 Agatston units.  2.  Structurally normal heart.  3.  The mitral valve had mitral annular calcifications.     Chelo Parada MD  08/07/20

## 2020-08-18 ENCOUNTER — HOSPITAL ENCOUNTER (OUTPATIENT)
Dept: LAB | Facility: HOSPITAL | Age: 75
Discharge: HOME | End: 2020-08-18
Attending: INTERNAL MEDICINE

## 2020-08-18 LAB
ALBUMIN SERPL-MCNC: 4.1 G/DL (ref 3.5–5)
ALBUMIN/GLOB SERPL: 1.4 {RATIO} (ref 1.4–2.6)
ALP SERPL-CCNC: 85 U/L (ref 43–160)
ALT SERPL-CCNC: 12 U/L (ref 10–40)
ANION GAP SERPL CALC-SCNC: 18 MMOL/L (ref 8–19)
AST SERPL-CCNC: 30 U/L (ref 15–50)
BASOPHILS # BLD AUTO: 0.03 10*3/UL (ref 0–0.2)
BASOPHILS NFR BLD AUTO: 0.4 % (ref 0–3)
BILIRUB SERPL-MCNC: 0.29 MG/DL (ref 0.2–1.3)
BUN SERPL-MCNC: 12 MG/DL (ref 5–25)
BUN/CREAT SERPL: 11 {RATIO} (ref 6–20)
CALCIUM SERPL-MCNC: 9.6 MG/DL (ref 8.7–10.4)
CHLORIDE SERPL-SCNC: 99 MMOL/L (ref 99–111)
CONV ABS IMM GRAN: 0.02 10*3/UL (ref 0–0.2)
CONV CO2: 28 MMOL/L (ref 22–32)
CONV CREATININE URINE, RANDOM: 88.4 MG/DL (ref 10–300)
CONV IMMATURE GRAN: 0.3 % (ref 0–1.8)
CONV MICROALBUM.,U,RANDOM: 44.7 MG/L (ref 0–20)
CONV TOTAL PROTEIN: 7.1 G/DL (ref 6.3–8.2)
CREAT UR-MCNC: 1.05 MG/DL (ref 0.5–0.9)
DEPRECATED RDW RBC AUTO: 48.9 FL (ref 36.4–46.3)
EOSINOPHIL # BLD AUTO: 0.15 10*3/UL (ref 0–0.7)
EOSINOPHIL # BLD AUTO: 2.2 % (ref 0–7)
ERYTHROCYTE [DISTWIDTH] IN BLOOD BY AUTOMATED COUNT: 14.9 % (ref 11.7–14.4)
EST. AVERAGE GLUCOSE BLD GHB EST-MCNC: 123 MG/DL
GFR SERPLBLD BASED ON 1.73 SQ M-ARVRAT: 51 ML/MIN/{1.73_M2}
GLOBULIN UR ELPH-MCNC: 3 G/DL (ref 2–3.5)
GLUCOSE SERPL-MCNC: 87 MG/DL (ref 65–99)
HBA1C MFR BLD: 5.9 % (ref 3.5–5.7)
HCT VFR BLD AUTO: 44.6 % (ref 37–47)
HGB BLD-MCNC: 14.3 G/DL (ref 12–16)
LYMPHOCYTES # BLD AUTO: 2.56 10*3/UL (ref 1–5)
LYMPHOCYTES NFR BLD AUTO: 37.5 % (ref 20–45)
MCH RBC QN AUTO: 28.7 PG (ref 27–31)
MCHC RBC AUTO-ENTMCNC: 32.1 G/DL (ref 33–37)
MCV RBC AUTO: 89.6 FL (ref 81–99)
MICROALBUMIN/CREAT UR: 50.6 MG/G{CRE} (ref 0–35)
MONOCYTES # BLD AUTO: 0.57 10*3/UL (ref 0.2–1.2)
MONOCYTES NFR BLD AUTO: 8.3 % (ref 3–10)
NEUTROPHILS # BLD AUTO: 3.5 10*3/UL (ref 2–8)
NEUTROPHILS NFR BLD AUTO: 51.3 % (ref 30–85)
NRBC CBCN: 0 % (ref 0–0.7)
OSMOLALITY SERPL CALC.SUM OF ELEC: 291 MOSM/KG (ref 273–304)
PLATELET # BLD AUTO: 238 10*3/UL (ref 130–400)
PMV BLD AUTO: 9.9 FL (ref 9.4–12.3)
POTASSIUM SERPL-SCNC: 4.1 MMOL/L (ref 3.5–5.3)
RBC # BLD AUTO: 4.98 10*6/UL (ref 4.2–5.4)
SODIUM SERPL-SCNC: 141 MMOL/L (ref 135–147)
WBC # BLD AUTO: 6.83 10*3/UL (ref 4.8–10.8)

## 2021-04-29 ENCOUNTER — HOSPITAL ENCOUNTER (OUTPATIENT)
Dept: LAB | Facility: HOSPITAL | Age: 76
Discharge: HOME | End: 2021-04-29
Attending: INTERNAL MEDICINE

## 2021-04-29 LAB
ANION GAP SERPL CALC-SCNC: 16 MMOL/L (ref 8–19)
BUN SERPL-MCNC: 12 MG/DL (ref 5–25)
BUN/CREAT SERPL: 13 {RATIO} (ref 6–20)
CALCIUM SERPL-MCNC: 9.1 MG/DL (ref 8.7–10.4)
CHLORIDE SERPL-SCNC: 103 MMOL/L (ref 99–111)
CONV CO2: 26 MMOL/L (ref 22–32)
CREAT UR-MCNC: 0.92 MG/DL (ref 0.5–0.9)
GFR SERPLBLD BASED ON 1.73 SQ M-ARVRAT: 60 ML/MIN/{1.73_M2}
GLUCOSE SERPL-MCNC: 85 MG/DL (ref 65–99)
OSMOLALITY SERPL CALC.SUM OF ELEC: 291 MOSM/KG (ref 273–304)
POTASSIUM SERPL-SCNC: 4.2 MMOL/L (ref 3.5–5.3)
SODIUM SERPL-SCNC: 141 MMOL/L (ref 135–147)

## 2021-05-11 ENCOUNTER — HOSPITAL ENCOUNTER (OUTPATIENT)
Dept: INFUSION THERAPY | Facility: HOSPITAL | Age: 76
Setting detail: RECURRING SERIES
Discharge: HOME | End: 2021-05-26
Attending: INTERNAL MEDICINE

## 2021-05-13 NOTE — PROGRESS NOTES
"   Progress Note      Patient Name: Ivania Mattson   Patient ID: [id number]   Sex: Female   YOB: 1945    Primary Care Provider: Dillon Gardner MD   Referring Provider: Dillon Gardner MD    Visit Date: July 14, 2020    Provider: Dmitry Snowden MD   Location: [facility]   Location Address: [address] [place] [address], [facility]   [place], [place]  [id number]   Location Phone: [phone]          Chief Complaint  · Chest pain       History Of Present Illness  Ivania Mattson is a 74-year-old female with history of chest pain off and on. Still complains of some pain. Lasts for a few minutes, relieved spontaneously, not exertional.   CURRENT MEDICATIONS: include Colestipol 1 mg daily; Ropinirole 2 mg t.i.d.; Metoprolol 25 mg b.i.d.; Furosemide 40 mg daily; Gabapentin 600 mg t.i.d.; Simvastatin 40 mg daily; Duloxetine 60 mg daily; potassium 10 mEq daily. The dosage and frequency of the medications were reviewed with the patient.   PAST MEDICAL HISTORY: Arthritis; diabetes mellitus; GERD.   FAMILY HISTORY: Negative for diabetes, hypertension, and heart disease.   PSYCHOSOCIAL HISTORY: Positive for history of mood change or depression. She rarely drinks alcohol. She previously smoked but quit.       Review of Systems  · Cardiovascular  o Denies  o : palpitations (fast, fluttering, or skipping beats), swelling (feet, ankles, hands), shortness of breath while walking or lying flat, chest pain or angina pectoris   · Respiratory  o Denies  o : chronic or frequent cough, asthma or wheezing      Vitals  Date Time BP Position Site L\R Cuff Size HR RR TEMP (F) WT  HT  BMI kg/m2 BSA m2 O2 Sat        07/14/2020 02:03 /66 Sitting    68 - R   174lbs 16oz 4'  11\" 35.35 1.82           Physical Examination  · Constitutional  o Appearance  o : Awake, alert, cooperative, pleasant.  · Respiratory  o Inspection of Chest  o : No chest wall deformities, moving equal.  o Auscultation of " Lungs  o : Good air entry with vesicular breath sounds.  · Cardiovascular  o Heart  o :   § Auscultation of Heart  § : S1 and S2 regular. No S3. No S4. No murmurs.  o Peripheral Vascular System  o :   § Extremities  § : Peripheral pulses were well felt. No edema. No cyanosis.  · Gastrointestinal  o Abdominal Examination  o : No masses or organomegaly noted.          Assessment     ASSESSMENT AND PLAN:  Precordial chest pain.  She continues to have chest pain off and on.  A stress test has been negative.  In view of her risk factors, will do a CT angiography of her coronary arteries to rule out any significant coronary artery disease.       MD DELORES Lopez/danae    This note was transcribed by Cheri Lewis.  danae/DELORES  The above service was transcribed by Cheri Lewis, and I attest to the accuracy of the note.  DELORES             Electronically Signed by: Kaila Lewis-, Other -Author on July 20, 2020 08:45:16 AM  Electronically Co-signed by: Dmitry Snowden MD -Reviewer on July 20, 2020 08:46:07 AM

## 2021-05-15 VITALS — WEIGHT: 165 LBS | OXYGEN SATURATION: 80 % | BODY MASS INDEX: 33.26 KG/M2 | HEART RATE: 87 BPM | HEIGHT: 59 IN

## 2021-05-15 VITALS
SYSTOLIC BLOOD PRESSURE: 106 MMHG | BODY MASS INDEX: 35.28 KG/M2 | WEIGHT: 175 LBS | HEART RATE: 68 BPM | HEIGHT: 59 IN | DIASTOLIC BLOOD PRESSURE: 66 MMHG

## 2021-05-15 VITALS
WEIGHT: 159 LBS | HEIGHT: 61 IN | SYSTOLIC BLOOD PRESSURE: 108 MMHG | BODY MASS INDEX: 30.02 KG/M2 | DIASTOLIC BLOOD PRESSURE: 66 MMHG | HEART RATE: 66 BPM

## 2021-05-26 ENCOUNTER — HOSPITAL ENCOUNTER (OUTPATIENT)
Dept: INFUSION THERAPY | Facility: HOSPITAL | Age: 76
Setting detail: RECURRING SERIES
End: 2021-05-26
Attending: INTERNAL MEDICINE

## 2021-07-13 DIAGNOSIS — M54.30 SCIATIC LEG PAIN: Primary | ICD-10-CM

## 2021-07-13 RX ORDER — ROPINIROLE 2 MG/1
2 TABLET, FILM COATED ORAL 3 TIMES DAILY
Qty: 270 TABLET | Refills: 0 | Status: SHIPPED | OUTPATIENT
Start: 2021-07-13 | End: 2021-10-20 | Stop reason: SDUPTHER

## 2021-07-16 PROBLEM — M79.89 LIMB SWELLING: Status: ACTIVE | Noted: 2021-07-16

## 2021-07-16 PROBLEM — J44.9 COPD (CHRONIC OBSTRUCTIVE PULMONARY DISEASE) (HCC): Status: ACTIVE | Noted: 2021-07-16

## 2021-07-16 PROBLEM — I50.9 CONGESTIVE HEART FAILURE (HCC): Status: ACTIVE | Noted: 2021-07-16

## 2021-07-16 PROBLEM — M51.369 LUMBAR ADJACENT SEGMENT DISEASE WITH SPONDYLOLISTHESIS: Status: ACTIVE | Noted: 2018-06-05

## 2021-07-16 PROBLEM — R94.31 ABNORMAL EKG: Status: ACTIVE | Noted: 2018-07-17

## 2021-07-16 PROBLEM — N32.9 BLADDER DISORDER: Status: ACTIVE | Noted: 2021-07-16

## 2021-07-16 PROBLEM — M25.559 HIP PAIN: Status: ACTIVE | Noted: 2017-02-21

## 2021-07-16 PROBLEM — M43.16 LUMBAR ADJACENT SEGMENT DISEASE WITH SPONDYLOLISTHESIS: Status: ACTIVE | Noted: 2018-06-05

## 2021-07-16 PROBLEM — Z22.322 MRSA NASAL COLONIZATION: Status: ACTIVE | Noted: 2018-07-13

## 2021-07-16 PROBLEM — M51.369 DEGENERATION OF LUMBAR INTERVERTEBRAL DISC: Status: ACTIVE | Noted: 2021-07-16

## 2021-07-16 PROBLEM — E11.9: Status: ACTIVE | Noted: 2018-07-17

## 2021-07-16 PROBLEM — M51.36 LUMBAR ADJACENT SEGMENT DISEASE WITH SPONDYLOLISTHESIS: Status: ACTIVE | Noted: 2018-06-05

## 2021-07-16 PROBLEM — M51.36 DEGENERATION OF LUMBAR INTERVERTEBRAL DISC: Status: ACTIVE | Noted: 2021-07-16

## 2021-07-16 PROBLEM — M47.816 LUMBAR SPONDYLOSIS: Status: ACTIVE | Noted: 2017-03-21

## 2021-07-16 PROBLEM — M48.061 LUMBAR SPINAL STENOSIS: Status: ACTIVE | Noted: 2018-07-23

## 2021-07-16 RX ORDER — HYDROCODONE BITARTRATE AND ACETAMINOPHEN 7.5; 325 MG/1; MG/1
TABLET ORAL
COMMUNITY
End: 2021-10-20

## 2021-07-16 RX ORDER — BENZONATATE 100 MG/1
CAPSULE ORAL
COMMUNITY
End: 2021-10-20

## 2021-07-16 RX ORDER — ACAMPROSATE CALCIUM 333 MG/1
TABLET, DELAYED RELEASE ORAL
COMMUNITY
End: 2021-12-16 | Stop reason: HOSPADM

## 2021-07-16 RX ORDER — MONTELUKAST SODIUM 4 MG/1
1 TABLET, CHEWABLE ORAL NIGHTLY
COMMUNITY

## 2021-07-16 RX ORDER — BLOOD SUGAR DIAGNOSTIC
STRIP MISCELLANEOUS
COMMUNITY
Start: 2021-05-26 | End: 2021-11-15

## 2021-07-16 RX ORDER — POLYETHYLENE GLYCOL 3350, SODIUM CHLORIDE, SODIUM BICARBONATE, POTASSIUM CHLORIDE 420; 11.2; 5.72; 1.48 G/4L; G/4L; G/4L; G/4L
POWDER, FOR SOLUTION ORAL
COMMUNITY
End: 2021-10-20

## 2021-07-16 RX ORDER — LANCETS
1 EACH MISCELLANEOUS AS NEEDED
COMMUNITY
Start: 2021-05-26 | End: 2022-04-22

## 2021-07-16 RX ORDER — DOXYCYCLINE 100 MG/1
CAPSULE ORAL
COMMUNITY
End: 2021-10-20

## 2021-07-16 RX ORDER — PREDNISONE 10 MG/1
TABLET ORAL
COMMUNITY
End: 2021-10-20

## 2021-07-16 RX ORDER — OXYCODONE HYDROCHLORIDE AND ACETAMINOPHEN 5; 325 MG/1; MG/1
TABLET ORAL EVERY 12 HOURS
COMMUNITY
End: 2021-10-20

## 2021-07-16 RX ORDER — LIDOCAINE 50 MG/G
PATCH TOPICAL
COMMUNITY
Start: 2021-05-04 | End: 2021-12-16 | Stop reason: HOSPADM

## 2021-07-16 RX ORDER — BLOOD GLUCOSE CONTROL HIGH,LOW
EACH MISCELLANEOUS
COMMUNITY
Start: 2021-05-27

## 2021-07-16 RX ORDER — IPRATROPIUM BROMIDE AND ALBUTEROL SULFATE 2.5; .5 MG/3ML; MG/3ML
SOLUTION RESPIRATORY (INHALATION)
COMMUNITY
End: 2021-12-16 | Stop reason: HOSPADM

## 2021-07-16 RX ORDER — POTASSIUM CHLORIDE 750 MG/1
10 TABLET, EXTENDED RELEASE ORAL NIGHTLY
COMMUNITY
Start: 2021-07-13 | End: 2022-04-22

## 2021-07-16 RX ORDER — ALBUTEROL SULFATE 90 UG/1
AEROSOL, METERED RESPIRATORY (INHALATION)
COMMUNITY
End: 2021-12-16 | Stop reason: HOSPADM

## 2021-07-21 ENCOUNTER — OFFICE VISIT (OUTPATIENT)
Dept: INTERNAL MEDICINE | Facility: CLINIC | Age: 76
End: 2021-07-21

## 2021-07-21 VITALS
SYSTOLIC BLOOD PRESSURE: 106 MMHG | OXYGEN SATURATION: 97 % | HEART RATE: 89 BPM | WEIGHT: 166.2 LBS | DIASTOLIC BLOOD PRESSURE: 72 MMHG | BODY MASS INDEX: 33.51 KG/M2 | TEMPERATURE: 97.9 F | HEIGHT: 59 IN

## 2021-07-21 DIAGNOSIS — M54.6 THORACIC SPINE PAIN: ICD-10-CM

## 2021-07-21 DIAGNOSIS — L08.9 SKIN INFECTION: ICD-10-CM

## 2021-07-21 DIAGNOSIS — G89.29 CHRONIC MIDLINE LOW BACK PAIN WITHOUT SCIATICA: Primary | ICD-10-CM

## 2021-07-21 DIAGNOSIS — E11.40 TYPE 2 DIABETES MELLITUS WITH DIABETIC NEUROPATHY, WITHOUT LONG-TERM CURRENT USE OF INSULIN (HCC): ICD-10-CM

## 2021-07-21 DIAGNOSIS — M89.8X9 BONY PROMINENCE: ICD-10-CM

## 2021-07-21 DIAGNOSIS — M54.50 CHRONIC MIDLINE LOW BACK PAIN WITHOUT SCIATICA: Primary | ICD-10-CM

## 2021-07-21 PROCEDURE — 99214 OFFICE O/P EST MOD 30 MIN: CPT | Performed by: NURSE PRACTITIONER

## 2021-07-21 RX ORDER — CEPHALEXIN 250 MG/1
250 CAPSULE ORAL 3 TIMES DAILY
Qty: 21 CAPSULE | Refills: 0 | Status: SHIPPED | OUTPATIENT
Start: 2021-07-21 | End: 2021-07-28

## 2021-07-21 RX ORDER — GABAPENTIN 600 MG/1
600 TABLET ORAL 3 TIMES DAILY
Qty: 270 TABLET | Refills: 0 | Status: SHIPPED | OUTPATIENT
Start: 2021-07-21 | End: 2022-03-04

## 2021-07-21 RX ORDER — DULOXETIN HYDROCHLORIDE 60 MG/1
60 CAPSULE, DELAYED RELEASE ORAL DAILY
COMMUNITY
End: 2022-02-03 | Stop reason: SDUPTHER

## 2021-07-21 NOTE — PROGRESS NOTES
Chief Complaint  Back Pain (pt had back surgery 3 years ago and she is still having pain. she wants a second opinion and she possibly wants an MRI. ), Arm Injury (had a small knot on her left arm, possibly a cyst. it swelled up and drained, but has since swelled up again. she says it doesnt want to heal up. ), and back pain (small knot on back where she had surgery. pt says that it hurts to lean back on. no pressure at all or it hurts. )    Subjective    History of Present Illness:  Ivania Mattson presents to [facility] complaining of chronic back pain and a knot on her left arm.  She also needs a refill on her gabapentin.  She is doing well on it and has no side effects.    She had back surgery 7/23/2018 by Dr. Olivera.  She states that her pain was never resolved.  She states that since her back surgery she cannot walk without a walker.  She states that she has constant back pain with weightbearing.  She describes the pain as feeling achy and severe.  The pain is relieved when she is not weightbearing.  She has been to physical therapy which helped strengthen her arms and legs but did not improve her back pain.  She has used acupuncture with no improvement.  She uses ibuprofen daily to help with sleep.  She sleeps in a recliner.  She states she does not enjoy activities and is unable to cook or perform household task due to the pain.  Positive for neuropathy in her left toes that she is says is due to her diabetes.  She also has a knot at her surgical site. She once a second opinion by a back specialist who requires a recent MRI.    She is complaining of a knot on her left forearm that she has had for 3 weeks.  She states that 2 weeks it had yellow and greenish thick drainage.  She treated this with peroxide, Neosporin, and a Band-Aid.  She states that it then drained again 3 days ago.  She denies fever, chills, nausea, or vomiting.  She reports a history of C.  "difficile.      Objective   Vital Signs:   /72 (BP Location: Left arm, Patient Position: Sitting, Cuff Size: Large Adult)   Pulse 89   Temp 97.9 °F (36.6 °C) (Temporal)   Ht 149.9 cm (59\")   Wt 75.4 kg (166 lb 3.2 oz)   SpO2 97%   BMI 33.57 kg/m²       Physical Exam :  General: Well nourished, well hydrated, in no acute distress  HEENT: Conjunctiva clear, no exudate bilateral  Skin: Left forearm with approximate 1 inch erythematous, soft lesion, no drainage noted  Musculoskeletal: Thoracic and lumbar spine tender to palpation and bony prominence at midline; uses rolling walker for ambulation  Respiratory: Normal work of breathing.  Neurological: Alert, conversing normally  Psychological: Good eye contact and judgment intact       Assessment and Plan:  Diagnoses and all orders for this visit:    1. Chronic midline low back pain without sciatica (Primary)  Comments:  Recommend Tylenol arthritis twice a day and daily stretches as tolerated.  Orders:  -     XR Spine Lumbar 4+ View; Future    2. Thoracic spine pain  -     XR Spine Thoracic 3 View (In Office)    3. Bony prominence    4. Skin infection  -     cephalexin (Keflex) 250 MG capsule; Take 1 capsule by mouth 3 (Three) Times a Day for 7 days.  Dispense: 21 capsule; Refill: 0    5. Type 2 diabetes mellitus with diabetic neuropathy, without long-term current use of insulin (CMS/[facility])  -     gabapentin (NEURONTIN) 600 MG tablet; Take 1 tablet by mouth 3 (Three) Times a Day.  Dispense: 270 tablet; Refill: 0      Stable on gabapentin refill given.  Education on diagnosis, medication, and treatment plan.    Follow Up:  Patient was given instructions and counseling regarding condition.Follow up for results x-ray results via phone. Return for Next scheduled follow up.   "

## 2021-07-29 ENCOUNTER — TELEPHONE (OUTPATIENT)
Dept: INTERNAL MEDICINE | Facility: CLINIC | Age: 76
End: 2021-07-29

## 2021-07-29 NOTE — TELEPHONE ENCOUNTER
PATIENT CALLED AND STATED SHE WAS SUPPOSED TO BE GETTING AN XRAY OF HER BACK. PATIENT STATES IT WAS SUPPOSED TO BE CHECKED BY THE CLINIC IF IT IS OK PER INSURANCE AN A CLINIC IN [place].

## 2021-08-03 ENCOUNTER — HOSPITAL ENCOUNTER (OUTPATIENT)
Dept: GENERAL RADIOLOGY | Facility: HOSPITAL | Age: 76
Discharge: HOME | End: 2021-08-03
Admitting: NURSE PRACTITIONER

## 2021-08-03 ENCOUNTER — TELEPHONE (OUTPATIENT)
Dept: INTERNAL MEDICINE | Facility: CLINIC | Age: 76
End: 2021-08-03

## 2021-08-03 DIAGNOSIS — M54.50 CHRONIC MIDLINE LOW BACK PAIN WITHOUT SCIATICA: ICD-10-CM

## 2021-08-03 DIAGNOSIS — G89.29 CHRONIC MIDLINE LOW BACK PAIN WITHOUT SCIATICA: ICD-10-CM

## 2021-08-03 PROCEDURE — 72110 X-RAY EXAM L-2 SPINE 4/>VWS: CPT

## 2021-08-03 PROCEDURE — 72072 X-RAY EXAM THORAC SPINE 3VWS: CPT

## 2021-08-03 NOTE — TELEPHONE ENCOUNTER
Patient called and left a message concerning her xray orders. I called the patient back and left a message stating that she can go to the hospital as a walk in to have the xrays done. I explained that the orders have been placed and the hospital will have the orders when she arrives.

## 2021-08-06 ENCOUNTER — TELEPHONE (OUTPATIENT)
Dept: INTERNAL MEDICINE | Facility: CLINIC | Age: 76
End: 2021-08-06

## 2021-08-06 DIAGNOSIS — G89.29 CHRONIC LOW BACK PAIN, UNSPECIFIED BACK PAIN LATERALITY, UNSPECIFIED WHETHER SCIATICA PRESENT: Primary | ICD-10-CM

## 2021-08-06 DIAGNOSIS — M54.50 CHRONIC LOW BACK PAIN, UNSPECIFIED BACK PAIN LATERALITY, UNSPECIFIED WHETHER SCIATICA PRESENT: Primary | ICD-10-CM

## 2021-08-06 NOTE — TELEPHONE ENCOUNTER
----- Message from JIN Ventura sent at 8/4/2021  7:10 PM EDT -----  Please call the patient. She has 2 x rays. You may give results but I do not have an explanation as that will need to come from a specialist.  Find out the name of the back specialist she wants to see. Then call the specialist and see if they will see her for the xray findings without an MRI. She has had pain since her surgery in 2018 and has poor quality of life due to the pain.  Thank you.

## 2021-08-20 ENCOUNTER — TELEPHONE (OUTPATIENT)
Dept: INTERNAL MEDICINE | Facility: CLINIC | Age: 76
End: 2021-08-20

## 2021-08-20 NOTE — TELEPHONE ENCOUNTER
Dr. Hanks's office will not see her without an MRI.  Can you please order this for her?  She is in pain and needs to get this quickly.  Thanks!!

## 2021-09-14 ENCOUNTER — OFFICE VISIT (OUTPATIENT)
Dept: INTERNAL MEDICINE | Facility: CLINIC | Age: 76
End: 2021-09-14

## 2021-09-14 VITALS
HEIGHT: 59 IN | HEART RATE: 77 BPM | BODY MASS INDEX: 33.14 KG/M2 | WEIGHT: 164.4 LBS | SYSTOLIC BLOOD PRESSURE: 106 MMHG | OXYGEN SATURATION: 96 % | TEMPERATURE: 97.4 F | DIASTOLIC BLOOD PRESSURE: 70 MMHG
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
## 2021-09-14 DIAGNOSIS — M54.50 CHRONIC LOW BACK PAIN, UNSPECIFIED BACK PAIN LATERALITY, UNSPECIFIED WHETHER SCIATICA PRESENT: Chronic | ICD-10-CM

## 2021-09-14 DIAGNOSIS — G89.29 CHRONIC LOW BACK PAIN, UNSPECIFIED BACK PAIN LATERALITY, UNSPECIFIED WHETHER SCIATICA PRESENT: Chronic | ICD-10-CM

## 2021-09-14 DIAGNOSIS — M54.6 THORACIC SPINE PAIN: Primary | Chronic | ICD-10-CM
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
## 2021-09-14 DIAGNOSIS — R26.89 ABNORMALITY OF GAIT DUE TO IMPAIRMENT OF BALANCE: ICD-10-CM

## 2021-09-14 PROCEDURE — 99213 OFFICE O/P EST LOW 20 MIN: CPT | Performed by: NURSE PRACTITIONER

## 2021-09-14 NOTE — PATIENT INSTRUCTIONS

## 2021-09-14 NOTE — PROGRESS NOTES
Chief Complaint  Follow-up (Mri so she can go to [facility]. )    Subjective    History of Present Illness:  Ivania Mattson presents to [facility] for follow-up of of chronic back pain post thoracic and lumbar spinal x-rays.  She is status post lumbar laminectomy and stabilization 7/2018 by Dr. Olivera. Her pain was never resolved and since then she cannot walk without a walker.  She states she does not enjoy activities and is unable to cook or perform many household task due to the pain.  The pain is described as achy and severe.   The pain is constant with weightbearing. She can not bear to stand for long. 8-9/10 at the end of the day.  She is able to dress and bath herself. She has fallen twice at home in the past 2 months due to being off balance.  Treatment since her past back surgery has been physical therapy and acupuncture which did not help.  The physical therapy helped strengthen her arms and legs but did not improve her back pain. She uses ibuprofen daily to help with sleep and she sleeps in a recliner.   Positive for neuropathy in all her left toes. She wants to see a back specialist who requires a recent MRI.      Spinal x-rays on 7/21/2021 showed the following: Postsurgical changes noted related to posterior thoracolumbar fixation with hardware extending from the T12 through L5 levels.  The hardware appears intact.  There is mild asymmetric lucency surrounding the left L5 pedicle screw which may relate to mild loosening.  There is mild anterolisthesis of L4 on L5 measuring 4 mm. Multilevel disc narrowing most pronounced at the L5-S1 level with vacuum disc phenomenon noted.  There is disc narrowing in the visualized lower cervical spine at the C5-6 and C6-7 levels. Disc narrowing most pronounced at T11-12 and T12-L1.  Thoracolumbar posterior fixation hardware extending from the T12 level into the lumbar spine.  Exaggerated thoracic kyphosis.    Objective  "  Vital Signs:   /70 (BP Location: Left arm, Patient Position: Sitting, Cuff Size: Adult)   Pulse 77   Temp 97.4 °F (36.3 °C) (Temporal)   Ht 149.9 cm (59\")   Wt 74.6 kg (164 lb 6.4 oz)   SpO2 96%   BMI 33.20 kg/m²       Physical Exam :  General: Well nourished, well hydrated, in no acute distress  HEENT: Conjunctiva clear, no exudate bilateral  Musculoskeletal: Thoracic and lumbar spine non-tender to palpation and kyphosis; uses rolling walker for ambulation  Respiratory: Normal work of breathing.  Neurological: Alert, conversing normally  Psychological: Good eye contact and judgment intact       Assessment and Plan:  Diagnoses and all orders for this visit:    1. Thoracic spine pain (Primary)  -     MRI Thoracic Spine Without Contrast; Future  -     Ambulatory Referral to Neurosurgery    2. Chronic low back pain, unspecified back pain laterality, unspecified whether sciatica present  -     MRI Lumbar Spine With & Without Contrast; Future  -     Ambulatory Referral to Neurosurgery    3. Fall in home, initial encounter  Comments:  Written info provided on preventing falls at home.   Orders:  -     MRI Lumbar Spine With & Without Contrast; Future  -     MRI Thoracic Spine Without Contrast; Future    4. Abnormality of gait due to impairment of balance  Comments:  Continue to use walker.   Orders:  -     MRI Lumbar Spine With & Without Contrast; Future  -     MRI Thoracic Spine Without Contrast; Future  -     Ambulatory Referral to Neurosurgery      Follow Up:  Patient was given instructions and counseling regarding condition.Follow up for results x-ray results via phone. Return for Next scheduled follow up.   "

## 2021-09-30 ENCOUNTER — HOSPITAL ENCOUNTER (OUTPATIENT)
Dept: MRI IMAGING | Facility: HOSPITAL | Age: 76
Discharge: HOME | End: 2021-09-30

## 2021-09-30 DIAGNOSIS — M54.50 CHRONIC LOW BACK PAIN, UNSPECIFIED BACK PAIN LATERALITY, UNSPECIFIED WHETHER SCIATICA PRESENT: Chronic | ICD-10-CM

## 2021-09-30 DIAGNOSIS — R26.89 ABNORMALITY OF GAIT DUE TO IMPAIRMENT OF BALANCE: ICD-10-CM

## 2021-09-30 DIAGNOSIS — G89.29 CHRONIC LOW BACK PAIN, UNSPECIFIED BACK PAIN LATERALITY, UNSPECIFIED WHETHER SCIATICA PRESENT: Chronic | ICD-10-CM
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
## 2021-09-30 DIAGNOSIS — M54.6 THORACIC SPINE PAIN: Chronic | ICD-10-CM
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
## 2021-09-30 LAB — CREAT BLDA-MCNC: 0.9 MG/DL

## 2021-09-30 PROCEDURE — 72146 MRI CHEST SPINE W/O DYE: CPT

## 2021-09-30 PROCEDURE — 72158 MRI LUMBAR SPINE W/O & W/DYE: CPT

## 2021-09-30 PROCEDURE — A9577 INJ MULTIHANCE: HCPCS | Performed by: NURSE PRACTITIONER

## 2021-09-30 PROCEDURE — 0 GADOBENATE DIMEGLUMINE 529 MG/ML SOLUTION: Performed by: NURSE PRACTITIONER

## 2021-09-30 PROCEDURE — 82565 ASSAY OF CREATININE: CPT

## 2021-09-30 RX ADMIN — GADOBENATE DIMEGLUMINE 15 ML: 529 INJECTION, SOLUTION INTRAVENOUS at 11:54

## 2021-10-05 ENCOUNTER — TELEPHONE (OUTPATIENT)
Dept: INTERNAL MEDICINE | Facility: CLINIC | Age: 76
End: 2021-10-05

## 2021-10-05 NOTE — TELEPHONE ENCOUNTER
The pt called and stated that she has been trying to get in to see Dr Barnes but he will not see her without an MRI. Well she has since gotten the Mri and wanted to let you know the days which she cannot be scheduled. November 11th and 12th. Other than those two days she said any time/ day works for her. She would really like to get in soon to see him. And shew would also like for you to give her a call if you could.

## 2021-10-11 ENCOUNTER — TRANSCRIBE ORDERS (OUTPATIENT)
Dept: LAB | Facility: HOSPITAL | Age: 76
End: 2021-10-11

## 2021-10-11 ENCOUNTER — LAB (OUTPATIENT)
Dept: LAB | Facility: HOSPITAL | Age: 76
End: 2021-10-11

## 2021-10-11 DIAGNOSIS — R73.01 IMPAIRED FASTING GLUCOSE: ICD-10-CM

## 2021-10-11 DIAGNOSIS — E11.9 DIABETES MELLITUS WITHOUT COMPLICATION (HCC): ICD-10-CM

## 2021-10-11 DIAGNOSIS — R06.00 DYSPNEA, UNSPECIFIED TYPE: ICD-10-CM

## 2021-10-11 DIAGNOSIS — E88.81 METABOLIC SYNDROME: ICD-10-CM

## 2021-10-11 DIAGNOSIS — R53.83 TIREDNESS: ICD-10-CM

## 2021-10-11 DIAGNOSIS — E78.5 HYPERLIPIDEMIA, UNSPECIFIED HYPERLIPIDEMIA TYPE: ICD-10-CM

## 2021-10-11 DIAGNOSIS — E11.9 DIABETES MELLITUS WITHOUT COMPLICATION (HCC): Primary | ICD-10-CM

## 2021-10-11 LAB
ALBUMIN SERPL-MCNC: 4.6 G/DL (ref 3.5–5.2)
ALBUMIN/GLOB SERPL: 1.8 G/DL
ALP SERPL-CCNC: 71 U/L (ref 39–117)
ALT SERPL W P-5'-P-CCNC: 15 U/L (ref 1–33)
ANION GAP SERPL CALCULATED.3IONS-SCNC: 14.7 MMOL/L (ref 5–15)
AST SERPL-CCNC: 32 U/L (ref 1–32)
BASOPHILS # BLD AUTO: 0.01 10*3/MM3 (ref 0–0.2)
BASOPHILS NFR BLD AUTO: 0.2 % (ref 0–1.5)
BILIRUB SERPL-MCNC: 0.4 MG/DL (ref 0–1.2)
BUN SERPL-MCNC: 14 MG/DL (ref 8–23)
BUN/CREAT SERPL: 18.2 (ref 7–25)
CALCIUM SPEC-SCNC: 9.2 MG/DL (ref 8.6–10.5)
CHLORIDE SERPL-SCNC: 100 MMOL/L (ref 98–107)
CHOLEST SERPL-MCNC: 162 MG/DL (ref 0–200)
CO2 SERPL-SCNC: 26.3 MMOL/L (ref 22–29)
CREAT SERPL-MCNC: 0.77 MG/DL (ref 0.57–1)
DEPRECATED RDW RBC AUTO: 47 FL (ref 37–54)
EOSINOPHIL # BLD AUTO: 0.14 10*3/MM3 (ref 0–0.4)
EOSINOPHIL NFR BLD AUTO: 2.4 % (ref 0.3–6.2)
ERYTHROCYTE [DISTWIDTH] IN BLOOD BY AUTOMATED COUNT: 14.3 % (ref 12.3–15.4)
GFR SERPL CREATININE-BSD FRML MDRD: 72 ML/MIN/1.73
GLOBULIN UR ELPH-MCNC: 2.6 GM/DL
GLUCOSE SERPL-MCNC: 63 MG/DL (ref 65–99)
HCT VFR BLD AUTO: 43.5 % (ref 34–46.6)
HDLC SERPL-MCNC: 32 MG/DL (ref 40–60)
HGB BLD-MCNC: 14.2 G/DL (ref 12–15.9)
IMM GRANULOCYTES # BLD AUTO: 0.02 10*3/MM3 (ref 0–0.05)
IMM GRANULOCYTES NFR BLD AUTO: 0.3 % (ref 0–0.5)
LDLC SERPL CALC-MCNC: 101 MG/DL (ref 0–100)
LDLC/HDLC SERPL: 3.04 {RATIO}
LYMPHOCYTES # BLD AUTO: 2.21 10*3/MM3 (ref 0.7–3.1)
LYMPHOCYTES NFR BLD AUTO: 38.5 % (ref 19.6–45.3)
MCH RBC QN AUTO: 29.1 PG (ref 26.6–33)
MCHC RBC AUTO-ENTMCNC: 32.6 G/DL (ref 31.5–35.7)
MCV RBC AUTO: 89.1 FL (ref 79–97)
MONOCYTES # BLD AUTO: 0.48 10*3/MM3 (ref 0.1–0.9)
MONOCYTES NFR BLD AUTO: 8.4 % (ref 5–12)
NEUTROPHILS NFR BLD AUTO: 2.88 10*3/MM3 (ref 1.7–7)
NEUTROPHILS NFR BLD AUTO: 50.2 % (ref 42.7–76)
NRBC BLD AUTO-RTO: 0 /100 WBC (ref 0–0.2)
NT-PROBNP SERPL-MCNC: 44.6 PG/ML (ref 0–1800)
PLATELET # BLD AUTO: 231 10*3/MM3 (ref 140–450)
PMV BLD AUTO: 9.9 FL (ref 6–12)
POTASSIUM SERPL-SCNC: 4.5 MMOL/L (ref 3.5–5.2)
PROT SERPL-MCNC: 7.2 G/DL (ref 6–8.5)
RBC # BLD AUTO: 4.88 10*6/MM3 (ref 3.77–5.28)
SODIUM SERPL-SCNC: 141 MMOL/L (ref 136–145)
TRIGL SERPL-MCNC: 164 MG/DL (ref 0–150)
TSH SERPL DL<=0.05 MIU/L-ACNC: 1.65 UIU/ML (ref 0.27–4.2)
VLDLC SERPL-MCNC: 29 MG/DL (ref 5–40)
WBC # BLD AUTO: 5.74 10*3/MM3 (ref 3.4–10.8)

## 2021-10-11 PROCEDURE — 83880 ASSAY OF NATRIURETIC PEPTIDE: CPT

## 2021-10-11 PROCEDURE — 80053 COMPREHEN METABOLIC PANEL: CPT

## 2021-10-11 PROCEDURE — 80061 LIPID PANEL: CPT

## 2021-10-11 PROCEDURE — 36415 COLL VENOUS BLD VENIPUNCTURE: CPT

## 2021-10-11 PROCEDURE — 85025 COMPLETE CBC W/AUTO DIFF WBC: CPT

## 2021-10-11 PROCEDURE — 84443 ASSAY THYROID STIM HORMONE: CPT

## 2021-10-20 ENCOUNTER — OFFICE VISIT (OUTPATIENT)
Dept: INTERNAL MEDICINE | Facility: CLINIC | Age: 76
End: 2021-10-20

## 2021-10-20 VITALS
DIASTOLIC BLOOD PRESSURE: 80 MMHG | HEIGHT: 59 IN | OXYGEN SATURATION: 97 % | TEMPERATURE: 97.2 F | BODY MASS INDEX: 33.3 KG/M2 | WEIGHT: 165.2 LBS | SYSTOLIC BLOOD PRESSURE: 116 MMHG | HEART RATE: 80 BPM

## 2021-10-20 DIAGNOSIS — Z23 FLU VACCINE NEED: ICD-10-CM

## 2021-10-20 DIAGNOSIS — G25.81 RESTLESS LEG SYNDROME: Chronic | ICD-10-CM

## 2021-10-20 DIAGNOSIS — E66.9 OBESITY (BMI 30-39.9): ICD-10-CM

## 2021-10-20 DIAGNOSIS — R10.13 EPIGASTRIC PAIN: ICD-10-CM

## 2021-10-20 DIAGNOSIS — E11.40 TYPE 2 DIABETES MELLITUS WITH DIABETIC NEUROPATHY, WITHOUT LONG-TERM CURRENT USE OF INSULIN (HCC): Primary | Chronic | ICD-10-CM

## 2021-10-20 DIAGNOSIS — M54.30 SCIATIC LEG PAIN: ICD-10-CM

## 2021-10-20 DIAGNOSIS — E78.5 HYPERLIPIDEMIA, UNSPECIFIED HYPERLIPIDEMIA TYPE: Chronic | ICD-10-CM

## 2021-10-20 PROCEDURE — 90662 IIV NO PRSV INCREASED AG IM: CPT | Performed by: NURSE PRACTITIONER

## 2021-10-20 PROCEDURE — G0008 ADMIN INFLUENZA VIRUS VAC: HCPCS | Performed by: NURSE PRACTITIONER

## 2021-10-20 PROCEDURE — 99214 OFFICE O/P EST MOD 30 MIN: CPT | Performed by: NURSE PRACTITIONER

## 2021-10-20 RX ORDER — ROPINIROLE 2 MG/1
2 TABLET, FILM COATED ORAL 3 TIMES DAILY
Qty: 270 TABLET | Refills: 1 | Status: SHIPPED | OUTPATIENT
Start: 2021-10-20 | End: 2021-11-15

## 2021-10-20 RX ORDER — OMEPRAZOLE 20 MG/1
20 CAPSULE, DELAYED RELEASE ORAL DAILY
Qty: 90 CAPSULE | Refills: 1 | Status: SHIPPED | OUTPATIENT
Start: 2021-10-20 | End: 2021-12-16 | Stop reason: HOSPADM

## 2021-10-20 NOTE — PROGRESS NOTES
Chief Complaint  Follow-up ( patient says you are getting a referral for a back DrCarlyle in [place] )    Subjective          Ivania Mattson presents to [facility]  History of Present Illness  76-year-old white female is here for follow up chronic disease management. Recent labs were reviewed. No medication side effects reported. Denies chest pain, shortness of air, or change in bowel habits.  She is complaining of epigastric pain for the past 1 year.  The pain is random and last about 6 to 8 minutes and might occur once a day or once every few weeks.  The pain has no relation to eating.  She denies GERD symptoms.  She admits to a history of gastric ulcers in the past.  Home blood sugars have been in the low 100s.    Summary medical history: Left TKA, laparoscopic cholecystectomy (3/4/2016), colonoscopy 2008 with polypectomy, right breast biopsy, right hip bursa surgery, lap band, ADRIANO, back surgery 7/23/2018, nasal surgery, neuropathy, chronic pain, bilateral hip arthritis, right inferior and superior pubic rami fractures noted sacral insufficiency fracture and right abductor tear of hip 2019, diverticulosis and diverticulitis 9/20/2015, C. difficile colitis diagnosed 2/27/2016 and resolved with oral vancomycin, GERD, hiatal hernia, small bowel enteritis and small bowel perforation micro-resolved, right kidney stones nonobstructing, abnormal LFTs (AST equals 136, )-work-up and alcohol use discussed was normal off EtOH April 2018, ERCP stent placement and removal of common bile duct stone with sphincterectomy on 2/29/2016, 2 days after, patient underwent laparoscopic cholecystectomy by Dr. Huizar and discharged on postoperative day 2, hospital stay May 2019 with sepsis-high fevers altered mental status with delirium metabolic encephalopathy, urinary tract infection with sepsis, ESBL organism sensitive to ertapenem, developed volume overload and was given Lasix on May  12, BNP 4200 at that time, also with iron deficiency anemia did receive 3 days of iron IV while in hospital, proximal atrial fib in the hospital 5/2019 with rapid ventricular response requiring Cardizem drip, converted back to sinus rhythm, chest pain x2 episodes November 2019 did see cardiology and stress testing done, then went to [facility] and CT angio July 2020 - testing, she is not currently following with Dr. Snowden.  Had eye exam March 2019 and June 2021.  History of depression on Cymbalta, obstructive sleep apnea and off CPAP, chronic lower extremity edema improved on Lasix 40 mg daily.      Current Outpatient Medications:   •  Accu-Chek Lyndsey Plus test strip, , Disp: , Rfl:   •  Accu-Chek Softclix Lancets lancets, , Disp: , Rfl:   •  aspirin 81 MG tablet, Take by mouth., Disp: , Rfl:   •  Blood Glucose Calibration (Accu-Chek Lyndsey) solution, , Disp: , Rfl:   •  colestipol (COLESTID) 1 g tablet, colestipol 1 gram oral tablet take 1 tablet (1 gram) by oral route 2 times per day swallowing whole with any liquid. Do not crush, chew and/or divide.   Active, Disp: , Rfl:   •  furosemide (LASIX) 40 MG tablet, Take by mouth., Disp: , Rfl:   •  gabapentin (NEURONTIN) 600 MG tablet, Take 1 tablet by mouth 3 (Three) Times a Day., Disp: 270 tablet, Rfl: 0  •  metFORMIN (GLUCOPHAGE) 850 MG tablet, Take 850 mg by mouth Daily With Breakfast., Disp: , Rfl:   •  metoprolol succinate XL (TOPROL-XL) 25 MG 24 hr tablet, Take by mouth., Disp: , Rfl:   •  Multiple Vitamin (MULTI VITAMIN DAILY) tablet, Take by mouth., Disp: , Rfl:   •  rOPINIRole (REQUIP) 2 MG tablet, Take 1 tablet by mouth 3 (Three) Times a Day., Disp: 270 tablet, Rfl: 1  •  simvastatin (ZOCOR) 40 MG tablet, Take by mouth., Disp: , Rfl:   •  acamprosate (CAMPRAL) 333 MG EC tablet, acamprosate 333 mg oral tablet,delayed release (DR/EC) take 2 tablets (666 mg) by oral route 3 times per day   Suspended, Disp: , Rfl:   •  albuterol sulfate HFA (Ventolin HFA)  "108 (90 Base) MCG/ACT inhaler, Ventolin HFA 90 mcg/actuation aerosol inhaler, Disp: , Rfl:   •  DULoxetine (Cymbalta) 60 MG capsule, Take 60 mg by mouth Daily., Disp: , Rfl:   •  ipratropium-albuterol (DUO-NEB) 0.5-2.5 mg/3 ml nebulizer, ipratropium 0.5 mg-albuterol 3 mg (2.5 mg base)/3 mL nebulization soln, Disp: , Rfl:   •  lidocaine (LIDODERM) 5 %, , Disp: , Rfl:   •  meloxicam (MOBIC) 15 MG tablet, Take by mouth., Disp: , Rfl:   •  omeprazole (PrilOSEC) 20 MG capsule, Take 1 capsule by mouth Daily., Disp: 90 capsule, Rfl: 1  •  potassium chloride (K-DUR,KLOR-CON) 10 MEQ CR tablet, , Disp: , Rfl:     Objective   Vital Signs:   /80 (BP Location: Left arm, Patient Position: Sitting, Cuff Size: Small Adult)   Pulse 80   Temp 97.2 °F (36.2 °C)   Ht 149.9 cm (59\")   Wt 74.9 kg (165 lb 3.2 oz)   SpO2 97%   BMI 33.37 kg/m²    Estimated body mass index is 33.37 kg/m² as calculated from the following:    Height as of this encounter: 149.9 cm (59\").    Weight as of this encounter: 74.9 kg (165 lb 3.2 oz).   Physical Exam  Vitals reviewed.   Constitutional:       General: She is not in acute distress.     Appearance: Normal appearance.   HENT:      Head: Normocephalic and atraumatic.   Eyes:      Conjunctiva/sclera: Conjunctivae normal.   Neck:      Comments: No thyroid enlargement  Cardiovascular:      Rate and Rhythm: Normal rate and regular rhythm.      Heart sounds: Normal heart sounds.   Pulmonary:      Effort: Pulmonary effort is normal.      Breath sounds: Normal breath sounds. No wheezing, rhonchi or rales.   Abdominal:      General: Abdomen is flat. Bowel sounds are normal. There is no distension.      Palpations: Abdomen is soft. There is no mass.      Tenderness: There is no abdominal tenderness.   Musculoskeletal:      Cervical back: Neck supple. No tenderness.      Right lower leg: No edema.      Left lower leg: No edema.      Comments: Uses rolling walker for ambulation   Lymphadenopathy:      " Cervical: No cervical adenopathy.   Skin:     General: Skin is warm and dry.   Neurological:      General: No focal deficit present.      Mental Status: She is alert.   Psychiatric:         Mood and Affect: Mood normal.         Behavior: Behavior normal.         Thought Content: Thought content normal.         Judgment: Judgment normal.        Result Review :   The following data was reviewed by: JIN Ventura on 10/20/2021:  CMP    CMP 4/29/21 9/30/21 10/11/21   Glucose   63 (A)   Glucose 85     BUN 12  14   Creatinine 0.92 (A) 0.90 0.77   eGFR Non African Am   72   Sodium 141  141   Potassium 4.2  4.5   Chloride 103  100   Calcium 9.1  9.2   Albumin   4.60   Total Bilirubin   0.4   Alkaline Phosphatase   71   AST (SGOT)   32   ALT (SGPT)   15   (A) Abnormal value       Comments are available for some flowsheets but are not being displayed.           CBC w/diff    CBC w/Diff 10/11/21   WBC 5.74   RBC 4.88   Hemoglobin 14.2   Hematocrit 43.5   MCV 89.1   MCH 29.1   MCHC 32.6   RDW 14.3   Platelets 231   Neutrophil Rel % 50.2   Immature Granulocyte Rel % 0.3   Lymphocyte Rel % 38.5   Monocyte Rel % 8.4   Eosinophil Rel % 2.4   Basophil Rel % 0.2           Lipid Panel    Lipid Panel 10/11/21   Total Cholesterol 162   Triglycerides 164 (A)   HDL Cholesterol 32 (A)   VLDL Cholesterol 29   LDL Cholesterol  101 (A)   LDL/HDL Ratio 3.04   (A) Abnormal value            TSH    TSH 10/11/21   TSH 1.650               Component      Latest Ref Rng & Units 4/15/2019 6/24/2019 8/27/2019 11/29/2019   Hemoglobin A1C      3.5 - 5.7 % 5.8 (H) 5.6 5.4 5.3     Component      Latest Ref Rng & Units 8/18/2020   Hemoglobin A1C      3.5 - 5.7 % 5.9 (H)                   Assessment and Plan      Diagnoses and all orders for this visit:    1. Type 2 diabetes mellitus with diabetic neuropathy, without long-term current use of insulin (HCC) (Primary)  Comments:  Stable on medication and to continue current treatment plan.  Orders:  -      Comprehensive Metabolic Panel; Future  -     Hemoglobin A1c; Future    2. Obesity (BMI 30-39.9)  Comments:  Discussed need for weight loss through diet.    3. Restless leg syndrome  Comments:  Stable on medication and to continue current treatment plan.  Orders:  -     rOPINIRole (REQUIP) 2 MG tablet; Take 1 tablet by mouth 3 (Three) Times a Day.  Dispense: 270 tablet; Refill: 1    4. Epigastric pain  Comments:  Education on differential diagnosis, medication, and treatment plan.  Orders:  -     omeprazole (PrilOSEC) 20 MG capsule; Take 1 capsule by mouth Daily.  Dispense: 90 capsule; Refill: 1    5. Hyperlipidemia, unspecified hyperlipidemia type  Comments:  Stable on medication and to continue current treatment plan.  Orders:  -     Lipid Panel; Future    6. Sciatic leg pain  Comments:  She has an upcoming appointment with Dr. Masoud Camarillo, neurosurgery.    7. Flu vaccine need  Comments:  Agrees to flu vaccine.  Orders:  -     Fluzone High-Dose 65+yrs ([id number])         Follow Up       Return in about 3 months (around 1/20/2022) for Recheck.      I have reviewed information obtained and documented by others and I have confirmed the accuracy of this documented note.     Patient was given instructions and counseling regarding her condition or for health maintenance advice. Please see specific information pulled into the AVS if appropriate.     JIN Ventura

## 2021-11-15 DIAGNOSIS — G25.81 RESTLESS LEG SYNDROME: Chronic | ICD-10-CM

## 2021-11-15 RX ORDER — BLOOD SUGAR DIAGNOSTIC
STRIP MISCELLANEOUS
Qty: 100 EACH | Refills: 1 | Status: SHIPPED | OUTPATIENT
Start: 2021-11-15 | End: 2022-04-22

## 2021-11-15 RX ORDER — ROPINIROLE 2 MG/1
TABLET, FILM COATED ORAL
Qty: 270 TABLET | Refills: 1 | Status: SHIPPED | OUTPATIENT
Start: 2021-11-15 | End: 2022-07-15 | Stop reason: SDUPTHER

## 2021-11-16 RX ORDER — FUROSEMIDE 40 MG/1
40 TABLET ORAL DAILY
Qty: 30 TABLET | Refills: 5 | Status: SHIPPED | OUTPATIENT
Start: 2021-11-16 | End: 2022-05-09

## 2021-12-08 NOTE — PROGRESS NOTES
"Subjective   Patient ID: Ivania Mattson is a 76 y.o. female is being seen for consultation today at the request of JIN Ventura for chronic low back pain, unspecified back pain laterality. Patient had a MRI L-spine/T-spine on 09/30/2021 at [facility].     Treatment: no recent treatment    Today patient is having severe low back pain. Patient denies B/L leg pain, N/T in B/L legs, B/B incontinence.     Patient, Provider, and MA are all wearing a mask in our office today.     History of Present Illness     This patient has been having severe pain in her back for many years.  She had surgery on her back about 3 years ago at the [facility].  Postoperatively she really did not do well.  She says she is in more pain now than she was before the surgery.  The pain is all in her back.  There is no radiation into her legs at all.  She has no difficulty with bowel bladder control or other associated symptoms.  She has had no recent treatment.  She did have injections prior to her surgery but none since.    The following portions of the patient's history were reviewed and updated as appropriate: allergies, current medications, past family history, past medical history, past social history, past surgical history and problem list.    Review of Systems   Constitutional: Negative for chills and fever.   HENT: Negative for congestion.    Genitourinary: Negative for difficulty urinating and dysuria.   Musculoskeletal: Positive for back pain and gait problem. Negative for myalgias.   Neurological: Positive for weakness. Negative for numbness.       I have reviewed the review of systems as documented by my MA.      Objective     Vitals:    12/09/21 1224   BP: 120/75   Cuff Size: Adult   Pulse: 89   Temp: 98 °F (36.7 °C)   Weight: 74.9 kg (165 lb 3.2 oz)   Height: 149.9 cm (59\")     Body mass index is 33.37 kg/m².      Physical Exam  Constitutional:       Appearance: She is well-developed.   HENT:      Head: " Normocephalic and atraumatic.   Eyes:      Extraocular Movements: EOM normal.      Conjunctiva/sclera: Conjunctivae normal.      Pupils: Pupils are equal, round, and reactive to light.   Neck:      Vascular: No carotid bruit.   Neurological:      Mental Status: She is oriented to person, place, and time.      Coordination: Finger-Nose-Finger Test and Heel to Shin Test normal.      Gait: Gait is intact.      Deep Tendon Reflexes:      Reflex Scores:       Tricep reflexes are 2+ on the right side and 2+ on the left side.       Bicep reflexes are 2+ on the right side and 2+ on the left side.       Brachioradialis reflexes are 2+ on the right side and 2+ on the left side.       Patellar reflexes are 2+ on the right side and 2+ on the left side.       Achilles reflexes are 2+ on the right side and 2+ on the left side.  Psychiatric:         Speech: Speech normal.       Neurologic Exam     Mental Status   Oriented to person, place, and time.   Registration of memory: Good recent and remote memory.   Attention: normal. Concentration: normal.   Speech: speech is normal   Level of consciousness: alert  Knowledge: consistent with education.     Cranial Nerves     CN II   Visual fields full to confrontation.   Visual acuity: normal    CN III, IV, VI   Pupils are equal, round, and reactive to light.  Extraocular motions are normal.     CN V   Facial sensation intact.   Right corneal reflex: normal  Left corneal reflex: normal    CN VII   Facial expression full, symmetric.   Right facial weakness: none  Left facial weakness: none    CN VIII   Hearing: intact    CN IX, X   Palate: symmetric    CN XI   Right sternocleidomastoid strength: normal  Left sternocleidomastoid strength: normal    CN XII   Tongue: not atrophic  Tongue deviation: none    Motor Exam   Muscle bulk: normal  Right arm tone: normal  Left arm tone: normal  Right leg tone: normal  Left leg tone: normal    Strength   Strength 5/5 except as noted.     Sensory Exam    Light touch normal.     Gait, Coordination, and Reflexes     Gait  Gait: normal    Coordination   Finger to nose coordination: normal  Heel to shin coordination: normal    Reflexes   Right brachioradialis: 2+  Left brachioradialis: 2+  Right biceps: 2+  Left biceps: 2+  Right triceps: 2+  Left triceps: 2+  Right patellar: 2+  Left patellar: 2+  Right achilles: 2+  Left achilles: 2+  Right : 2+  Left : 2+          Assessment/Plan   Independent Review of Radiographic Studies:      I personally reviewed the images from the following studies.    I reviewed an MRI of her thoracic and her lumbar spine.  These were done at [facility].  This shows a fusion from T12-L5.  There is no evidence of any significant stenosis at any level.    Medical Decision Making:      I told the patient I do not see anything here on the imaging that I can fix.  I suggested that we go ahead and check a thoracic and lumbar myelogram.  I told the patient what a myelogram involves.  I explained that there is a 50% chance of developing a bad headache and nausea as a result of the test.  I explained that there is also a very small chance of infection, seizures, and bleeding.  I explained how we would treat a post myelogram headache including bedrest, caffeinated fluids, steroids, and blood patch.  The patient does ask to proceed.    Diagnoses and all orders for this visit:    1. Degeneration of lumbar intervertebral disc (Primary)  -     IR Myelogram 2 or More Areas; Future  -     Ct Thoracic Spine With Intrathecal Contrast; Future  -     CT Lumbar Spine With Intrathecal Contrast; Future  -     XR Spine Lumbar Complete With Flex & Ext; Future  -     dexamethasone (DECADRON) 4 MG tablet; Take 2 tablets by mouth Take As Directed. Take both tablets by mouth 2 hours before myelogram  Dispense: 2 tablet; Refill: 0      Return for After radiology test.

## 2021-12-08 NOTE — H&P (VIEW-ONLY)
"Subjective   Patient ID: Ivania Mattson is a 76 y.o. female is being seen for consultation today at the request of JIN Ventura for chronic low back pain, unspecified back pain laterality. Patient had a MRI L-spine/T-spine on 09/30/2021 at [facility].     Treatment: no recent treatment    Today patient is having severe low back pain. Patient denies B/L leg pain, N/T in B/L legs, B/B incontinence.     Patient, Provider, and MA are all wearing a mask in our office today.     History of Present Illness     This patient has been having severe pain in her back for many years.  She had surgery on her back about 3 years ago at the [facility].  Postoperatively she really did not do well.  She says she is in more pain now than she was before the surgery.  The pain is all in her back.  There is no radiation into her legs at all.  She has no difficulty with bowel bladder control or other associated symptoms.  She has had no recent treatment.  She did have injections prior to her surgery but none since.    The following portions of the patient's history were reviewed and updated as appropriate: allergies, current medications, past family history, past medical history, past social history, past surgical history and problem list.    Review of Systems   Constitutional: Negative for chills and fever.   HENT: Negative for congestion.    Genitourinary: Negative for difficulty urinating and dysuria.   Musculoskeletal: Positive for back pain and gait problem. Negative for myalgias.   Neurological: Positive for weakness. Negative for numbness.       I have reviewed the review of systems as documented by my MA.      Objective     Vitals:    12/09/21 1224   BP: 120/75   Cuff Size: Adult   Pulse: 89   Temp: 98 °F (36.7 °C)   Weight: 74.9 kg (165 lb 3.2 oz)   Height: 149.9 cm (59\")     Body mass index is 33.37 kg/m².      Physical Exam  Constitutional:       Appearance: She is well-developed.   HENT:      Head: " Normocephalic and atraumatic.   Eyes:      Extraocular Movements: EOM normal.      Conjunctiva/sclera: Conjunctivae normal.      Pupils: Pupils are equal, round, and reactive to light.   Neck:      Vascular: No carotid bruit.   Neurological:      Mental Status: She is oriented to person, place, and time.      Coordination: Finger-Nose-Finger Test and Heel to Shin Test normal.      Gait: Gait is intact.      Deep Tendon Reflexes:      Reflex Scores:       Tricep reflexes are 2+ on the right side and 2+ on the left side.       Bicep reflexes are 2+ on the right side and 2+ on the left side.       Brachioradialis reflexes are 2+ on the right side and 2+ on the left side.       Patellar reflexes are 2+ on the right side and 2+ on the left side.       Achilles reflexes are 2+ on the right side and 2+ on the left side.  Psychiatric:         Speech: Speech normal.       Neurologic Exam     Mental Status   Oriented to person, place, and time.   Registration of memory: Good recent and remote memory.   Attention: normal. Concentration: normal.   Speech: speech is normal   Level of consciousness: alert  Knowledge: consistent with education.     Cranial Nerves     CN II   Visual fields full to confrontation.   Visual acuity: normal    CN III, IV, VI   Pupils are equal, round, and reactive to light.  Extraocular motions are normal.     CN V   Facial sensation intact.   Right corneal reflex: normal  Left corneal reflex: normal    CN VII   Facial expression full, symmetric.   Right facial weakness: none  Left facial weakness: none    CN VIII   Hearing: intact    CN IX, X   Palate: symmetric    CN XI   Right sternocleidomastoid strength: normal  Left sternocleidomastoid strength: normal    CN XII   Tongue: not atrophic  Tongue deviation: none    Motor Exam   Muscle bulk: normal  Right arm tone: normal  Left arm tone: normal  Right leg tone: normal  Left leg tone: normal    Strength   Strength 5/5 except as noted.     Sensory Exam    Light touch normal.     Gait, Coordination, and Reflexes     Gait  Gait: normal    Coordination   Finger to nose coordination: normal  Heel to shin coordination: normal    Reflexes   Right brachioradialis: 2+  Left brachioradialis: 2+  Right biceps: 2+  Left biceps: 2+  Right triceps: 2+  Left triceps: 2+  Right patellar: 2+  Left patellar: 2+  Right achilles: 2+  Left achilles: 2+  Right : 2+  Left : 2+          Assessment/Plan   Independent Review of Radiographic Studies:      I personally reviewed the images from the following studies.    I reviewed an MRI of her thoracic and her lumbar spine.  These were done at [facility].  This shows a fusion from T12-L5.  There is no evidence of any significant stenosis at any level.    Medical Decision Making:      I told the patient I do not see anything here on the imaging that I can fix.  I suggested that we go ahead and check a thoracic and lumbar myelogram.  I told the patient what a myelogram involves.  I explained that there is a 50% chance of developing a bad headache and nausea as a result of the test.  I explained that there is also a very small chance of infection, seizures, and bleeding.  I explained how we would treat a post myelogram headache including bedrest, caffeinated fluids, steroids, and blood patch.  The patient does ask to proceed.    Diagnoses and all orders for this visit:    1. Degeneration of lumbar intervertebral disc (Primary)  -     IR Myelogram 2 or More Areas; Future  -     Ct Thoracic Spine With Intrathecal Contrast; Future  -     CT Lumbar Spine With Intrathecal Contrast; Future  -     XR Spine Lumbar Complete With Flex & Ext; Future  -     dexamethasone (DECADRON) 4 MG tablet; Take 2 tablets by mouth Take As Directed. Take both tablets by mouth 2 hours before myelogram  Dispense: 2 tablet; Refill: 0      Return for After radiology test.

## 2021-12-09 ENCOUNTER — OFFICE VISIT (OUTPATIENT)
Dept: NEUROSURGERY | Facility: CLINIC | Age: 76
End: 2021-12-09

## 2021-12-09 VITALS
WEIGHT: 165.2 LBS | DIASTOLIC BLOOD PRESSURE: 75 MMHG | SYSTOLIC BLOOD PRESSURE: 120 MMHG | HEIGHT: 59 IN | BODY MASS INDEX: 33.3 KG/M2 | TEMPERATURE: 98 F | HEART RATE: 89 BPM

## 2021-12-09 DIAGNOSIS — M51.36 DEGENERATION OF LUMBAR INTERVERTEBRAL DISC: Primary | ICD-10-CM

## 2021-12-09 PROCEDURE — 99204 OFFICE O/P NEW MOD 45 MIN: CPT | Performed by: NEUROLOGICAL SURGERY

## 2021-12-09 RX ORDER — DEXAMETHASONE 4 MG/1
8 TABLET ORAL TAKE AS DIRECTED
Qty: 2 TABLET | Refills: 0 | Status: SHIPPED | OUTPATIENT
Start: 2021-12-09 | End: 2021-12-14 | Stop reason: SDUPTHER

## 2021-12-10 NOTE — PROGRESS NOTES
12/16/21 0002   Pre-Procedure Phone Call   Procedure Time Verified Yes   Arrival Time 0615   Procedure Location Verified Yes   Medical History Reviewed No   NPO Status Reinforced Yes   Ride and Caregiver Arranged Yes   Patient Knows to Bring Current Medications   (No changesin medications since last reviewed. Decadron RX'd)   Bring Outside Films Requested   (Dr Camarillo patient)

## 2021-12-14 ENCOUNTER — TELEPHONE (OUTPATIENT)
Dept: NEUROSURGERY | Facility: CLINIC | Age: 76
End: 2021-12-14

## 2021-12-14 DIAGNOSIS — M51.36 DEGENERATION OF LUMBAR INTERVERTEBRAL DISC: ICD-10-CM

## 2021-12-14 RX ORDER — DEXAMETHASONE 4 MG/1
8 TABLET ORAL TAKE AS DIRECTED
Qty: 2 TABLET | Refills: 0 | Status: SHIPPED | OUTPATIENT
Start: 2021-12-14 | End: 2021-12-16 | Stop reason: HOSPADM

## 2021-12-14 NOTE — TELEPHONE ENCOUNTER
PATIENT WAS TO TAKE DEXAMETHASONE 8MG PRIOR TO HER MYELOGRAM ON 12/16/21.  HER PHARMACY HAS NOT RECEIVED IT.  SHE WOULD LIKE FOR IT TO BE SENT  PHARMACY - [facility] BECAUSE SHE HAS TO PICK HER  MEDS UP TODAY AND WOULD LIKE TO PICK THIS MED UP AS WELL.  PLEASE ADVISE    [phone]

## 2021-12-16 ENCOUNTER — APPOINTMENT (OUTPATIENT)
Dept: OTHER | Facility: HOSPITAL | Age: 76
End: 2021-12-16

## 2021-12-16 ENCOUNTER — HOSPITAL ENCOUNTER (OUTPATIENT)
Dept: GENERAL RADIOLOGY | Facility: HOSPITAL | Age: 76
Discharge: HOME | End: 2021-12-16

## 2021-12-16 ENCOUNTER — HOSPITAL ENCOUNTER (OUTPATIENT)
Dept: CT IMAGING | Facility: HOSPITAL | Age: 76
Discharge: HOME | End: 2021-12-16

## 2021-12-16 VITALS
WEIGHT: 165 LBS | RESPIRATION RATE: 16 BRPM | OXYGEN SATURATION: 97 % | HEIGHT: 59 IN | TEMPERATURE: 97.8 F | SYSTOLIC BLOOD PRESSURE: 134 MMHG | BODY MASS INDEX: 33.26 KG/M2 | HEART RATE: 76 BPM | DIASTOLIC BLOOD PRESSURE: 86 MMHG

## 2021-12-16 DIAGNOSIS — M51.36 DEGENERATION OF LUMBAR INTERVERTEBRAL DISC: ICD-10-CM

## 2021-12-16 DIAGNOSIS — Z09 FOLLOW UP: ICD-10-CM

## 2021-12-16 PROCEDURE — 72132 CT LUMBAR SPINE W/DYE: CPT

## 2021-12-16 PROCEDURE — 25010000002 IOPAMIDOL 61 % SOLUTION: Performed by: NEUROLOGICAL SURGERY

## 2021-12-16 PROCEDURE — 72129 CT CHEST SPINE W/DYE: CPT

## 2021-12-16 PROCEDURE — 0 LIDOCAINE 1 % SOLUTION: Performed by: NEUROLOGICAL SURGERY

## 2021-12-16 PROCEDURE — 62284 INJECTION FOR MYELOGRAM: CPT

## 2021-12-16 PROCEDURE — 72114 X-RAY EXAM L-S SPINE BENDING: CPT

## 2021-12-16 PROCEDURE — 62305 MYELOGRAPHY LUMBAR INJECTION: CPT

## 2021-12-16 PROCEDURE — 72240 MYELOGRAPHY NECK SPINE: CPT

## 2021-12-16 RX ORDER — ACETAMINOPHEN 325 MG/1
650 TABLET ORAL EVERY 4 HOURS PRN
Status: DISCONTINUED | OUTPATIENT
Start: 2021-12-16 | End: 2021-12-17 | Stop reason: HOSPADM

## 2021-12-16 RX ORDER — HYDROCODONE BITARTRATE AND ACETAMINOPHEN 5; 325 MG/1; MG/1
1 TABLET ORAL EVERY 4 HOURS PRN
Status: DISCONTINUED | OUTPATIENT
Start: 2021-12-16 | End: 2021-12-17 | Stop reason: HOSPADM

## 2021-12-16 RX ORDER — LIDOCAINE HYDROCHLORIDE 10 MG/ML
10 INJECTION, SOLUTION INFILTRATION; PERINEURAL ONCE
Status: COMPLETED | OUTPATIENT
Start: 2021-12-16 | End: 2021-12-16

## 2021-12-16 RX ADMIN — IOPAMIDOL 15 ML: 612 INJECTION, SOLUTION INTRATHECAL at 07:10

## 2021-12-16 RX ADMIN — HYDROCODONE BITARTRATE AND ACETAMINOPHEN 1 TABLET: 5; 325 TABLET ORAL at 07:48

## 2021-12-16 RX ADMIN — LIDOCAINE HYDROCHLORIDE 4 ML: 10 INJECTION, SOLUTION INFILTRATION; PERINEURAL at 07:09

## 2021-12-16 NOTE — NURSING NOTE
Patient assisted to PV via wheelchair to leave with her son. Walker given to her family as well. NAD noted.

## 2021-12-16 NOTE — DISCHARGE INSTRUCTIONS
EDUCATION /DISCHARGE INSTRUCTIONS:    A myelogram is a special radiology procedure of the spinal cord, spinal nerves and other related structures.  You will be awake during the examination.  An area of your lower back will be cleansed with an antiseptic solution.  The physician will inject a numbing medication in your lower back.  While your back is numb, a needle will be placed in the lower back area.  A small amount of spinal fluid may be withdrawn and sent to the lab if ordered by your physician. While the needle is in the back, an injection of a contrast material (xray dye) will be given through the needle.  The contrast material will allow the physician to see the spinal cord and spinal nerves.  Once injected, the needle will be removed and a band aid will be placed over the injection site.  The table will be tilted during the process to allow the contrast material to flow to particular areas in the spine.  Following the injection and xrays, you will be taken to the CT scanner where more pictures will be taken. After the procedure is finished, the contrast material will be absorbed by your body and eliminated through your kidneys.  The radiologist will study and interpret your myelogram and send the results to your physician.  Procedure risks of a myelogram include, but are not limited to:  *  Bleeding   *  Seizure  *  Infection   *  Headache, possibly severe requiring a blood patch  *  Contrast reaction  *  Nerve or cord injury  *  Paralysis and death    Benefits of the procedure:  *  Best examination for delineating pathology related to spinal cord compression from a disc and/or nerve root compression  Alternatives to the procedure:  MRI - a non invasive procedure requiring intravenous contrast injection.  Cannot be done on patients with certain pacemakers or metal in the body.  MRI risks include possible reaction to the contrast material, movement of metal located in the body.Benefit to MRI:  Non-invasive  and usually painless procedure.  THIS EDUCATION INFORMATION WAS REVIEWED PRIOR TO PROCEDURE AND CONSENT. Patient initials___LLB____Time___0635____    24 hour rest period ends tomorrow, December 17th after 1000 am.    Important information following your myelogram:  * ACTIVITY:   *  You may sit up in the car to go home.  *  When you get home, remain on bed rest (flat on your back or on your side) for 24 hours. You may place a rolled up towel under your neck for support  * You may get up to the bathroom and sit up to eat and drink then lie back down  * Drink additional fluids for 24 hours after the myelogram.   * Continue to drink additional fluids for the next 2-3 days. Water and caffeinated beverages are encouraged.  * Remain less active for the next two to three days.  * Do not drive for 24 hours following a myelogram.  * You may remove the bandage and shower in the morning.  * Resume taking  (Glucophage/Metformin) in 48 hrs. Your next dose will be: Saturday, December 18th.  *Resume taking aspirin today.      CALL YOUR PHYSICIAN FOR THE FOLLOWING:  * Pain at the injection site  * Redness, swelling, bruising or drainage at the injection site.  * A fever by mouth of 101.0 or any new symptoms  Headaches are a common side effect after a myelogram.  If you get a headache, you should stay flat in bed and drink plenty of fluids. If the headache persists and does not go away with rest/medication, CALL Dr. Camarillo at [phone]

## 2021-12-16 NOTE — NURSING NOTE
Pt arrived to Radiology triage [facility] for Thoracic and Lumbar Myelogram.   Pt wearing a mask as well as this RN for any bedside care.

## 2021-12-17 ENCOUNTER — TELEPHONE (OUTPATIENT)
Dept: INTERVENTIONAL RADIOLOGY/VASCULAR | Facility: HOSPITAL | Age: 76
End: 2021-12-17

## 2021-12-27 ENCOUNTER — OFFICE VISIT (OUTPATIENT)
Dept: NEUROSURGERY | Facility: CLINIC | Age: 76
End: 2021-12-27

## 2021-12-27 VITALS
DIASTOLIC BLOOD PRESSURE: 80 MMHG | WEIGHT: 165 LBS | HEART RATE: 87 BPM | BODY MASS INDEX: 33.26 KG/M2 | SYSTOLIC BLOOD PRESSURE: 135 MMHG | HEIGHT: 59 IN | TEMPERATURE: 98 F

## 2021-12-27 DIAGNOSIS — M47.816 LUMBAR SPONDYLOSIS: Primary | ICD-10-CM

## 2021-12-27 PROCEDURE — 99213 OFFICE O/P EST LOW 20 MIN: CPT | Performed by: NEUROLOGICAL SURGERY

## 2021-12-27 NOTE — PROGRESS NOTES
"Subjective   Patient ID: Ivania Mattson is a 76 y.o. female is here today for follow-up with a new Lumbar/Thoracic Myelogram that was done on 12.16.2021 for back and leg pain.    Today patient states that her symptoms are unchanged. Patient is having severe low back pain . Patient denies B/B incontinence, B/L leg N/T.     Patient, Provider, and MA are all wearing a mask in our office today.     History of Present Illness     This patient returns today.  She continues with pain in her back.  She does have some pain in her legs as well.  She has no numbness and tingling in her legs.    The following portions of the patient's history were reviewed and updated as appropriate: allergies, current medications, past family history, past medical history, past social history, past surgical history and problem list.    Review of Systems   Constitutional: Negative for chills and fever.   HENT: Negative for congestion.    Genitourinary: Negative for difficulty urinating and dysuria.   Musculoskeletal: Positive for back pain and myalgias.   Neurological: Positive for weakness. Negative for numbness.       I have reviewed the review of systems as documented by my MA.      Objective     Vitals:    12/27/21 1356   BP: 135/80   Cuff Size: Adult   Pulse: 87   Temp: 98 °F (36.7 °C)   Weight: 74.8 kg (165 lb)   Height: 149.9 cm (59\")     Body mass index is 33.33 kg/m².      Physical Exam  Neurological:      Mental Status: She is alert and oriented to person, place, and time.       Neurologic Exam     Mental Status   Oriented to person, place, and time.           Assessment/Plan   Independent Review of Radiographic Studies:      I personally reviewed the images from the following studies.    I reviewed a set of plain films as well as a myelogram and CT scan of the thoracic and lumbar spine.  The plain films of the lumbar spine show instrumentation from T12-L5.  There does not appear to be any abnormal movement on flexion and " extension.  I cannot tell for sure if he is solidly fused at all of those levels.  On the myelogram films themselves there appears to be pretty good nerve root filling throughout the entire lumbar spine.  Especially on the standing films.  L5-S1 in particular fills out pretty well.  The thoracic spine also appears to fill out pretty well.  On the post myelographic CT scan the entire thoracic spine looks okay for the most part.  In addition I do not see any nerve root pressure at any level in the lumbar spine.  There may be a little pressure at T10-T11 with some calcification of a disc protrusion but no significant pressure on the spinal cord.  Most of the cervical spine was also imaged on the CT and there is some spondylitic change there but again nothing that is terribly severe or causing any significant pressure on the neural elements.    Medical Decision Making:      I told the patient and her daughter about the imaging.  I told him that from my point of view I really do not see anything here that I could fix with surgery.  I think her only option at this point is to get into see a pain management doctor.    Diagnoses and all orders for this visit:    1. Lumbar spondylosis (Primary)  -     Ambulatory Referral to Pain Management      Return if symptoms worsen or fail to improve.

## 2022-01-24 ENCOUNTER — LAB (OUTPATIENT)
Dept: LAB | Facility: HOSPITAL | Age: 77
End: 2022-01-24

## 2022-01-24 DIAGNOSIS — E11.40 TYPE 2 DIABETES MELLITUS WITH DIABETIC NEUROPATHY, WITHOUT LONG-TERM CURRENT USE OF INSULIN: Chronic | ICD-10-CM

## 2022-01-24 DIAGNOSIS — E78.5 HYPERLIPIDEMIA, UNSPECIFIED HYPERLIPIDEMIA TYPE: Chronic | ICD-10-CM

## 2022-01-24 LAB
ALBUMIN SERPL-MCNC: 4.3 G/DL (ref 3.5–5.2)
ALBUMIN/GLOB SERPL: 1.7 G/DL
ALP SERPL-CCNC: 70 U/L (ref 39–117)
ALT SERPL W P-5'-P-CCNC: 14 U/L (ref 1–33)
ANION GAP SERPL CALCULATED.3IONS-SCNC: 8.6 MMOL/L (ref 5–15)
AST SERPL-CCNC: 36 U/L (ref 1–32)
BILIRUB SERPL-MCNC: 0.2 MG/DL (ref 0–1.2)
BUN SERPL-MCNC: 11 MG/DL (ref 8–23)
BUN/CREAT SERPL: 12.8 (ref 7–25)
CALCIUM SPEC-SCNC: 9.4 MG/DL (ref 8.6–10.5)
CHLORIDE SERPL-SCNC: 103 MMOL/L (ref 98–107)
CHOLEST SERPL-MCNC: 123 MG/DL (ref 0–200)
CO2 SERPL-SCNC: 29.4 MMOL/L (ref 22–29)
CREAT SERPL-MCNC: 0.86 MG/DL (ref 0.57–1)
GFR SERPL CREATININE-BSD FRML MDRD: 64 ML/MIN/1.73
GLOBULIN UR ELPH-MCNC: 2.5 GM/DL
GLUCOSE SERPL-MCNC: 79 MG/DL (ref 65–99)
HBA1C MFR BLD: 6.85 % (ref 4.8–5.6)
HDLC SERPL-MCNC: 34 MG/DL (ref 40–60)
LDLC SERPL CALC-MCNC: 60 MG/DL (ref 0–100)
LDLC/HDLC SERPL: 1.6 {RATIO}
POTASSIUM SERPL-SCNC: 4.2 MMOL/L (ref 3.5–5.2)
PROT SERPL-MCNC: 6.8 G/DL (ref 6–8.5)
SODIUM SERPL-SCNC: 141 MMOL/L (ref 136–145)
TRIGL SERPL-MCNC: 173 MG/DL (ref 0–150)
VLDLC SERPL-MCNC: 29 MG/DL (ref 5–40)

## 2022-01-24 PROCEDURE — 36415 COLL VENOUS BLD VENIPUNCTURE: CPT

## 2022-01-24 PROCEDURE — 83036 HEMOGLOBIN GLYCOSYLATED A1C: CPT

## 2022-01-24 PROCEDURE — 80053 COMPREHEN METABOLIC PANEL: CPT

## 2022-01-24 PROCEDURE — 80061 LIPID PANEL: CPT

## 2022-01-25 ENCOUNTER — OFFICE VISIT (OUTPATIENT)
Dept: INTERNAL MEDICINE | Facility: CLINIC | Age: 77
End: 2022-01-25

## 2022-01-25 VITALS
HEIGHT: 59 IN | TEMPERATURE: 99.7 F | OXYGEN SATURATION: 99 % | DIASTOLIC BLOOD PRESSURE: 71 MMHG | HEART RATE: 97 BPM | BODY MASS INDEX: 34.07 KG/M2 | WEIGHT: 169 LBS | SYSTOLIC BLOOD PRESSURE: 120 MMHG

## 2022-01-25 DIAGNOSIS — E66.9 OBESITY (BMI 30-39.9): ICD-10-CM

## 2022-01-25 DIAGNOSIS — I73.9 PERIPHERAL ARTERY DISEASE: Primary | ICD-10-CM

## 2022-01-25 DIAGNOSIS — M51.36 DEGENERATION OF LUMBAR INTERVERTEBRAL DISC: ICD-10-CM

## 2022-01-25 DIAGNOSIS — E11.40 TYPE 2 DIABETES MELLITUS WITH DIABETIC NEUROPATHY, WITHOUT LONG-TERM CURRENT USE OF INSULIN: ICD-10-CM

## 2022-01-25 DIAGNOSIS — E55.9 VITAMIN D DEFICIENCY: ICD-10-CM

## 2022-01-25 DIAGNOSIS — M48.062 SPINAL STENOSIS OF LUMBAR REGION WITH NEUROGENIC CLAUDICATION: ICD-10-CM

## 2022-01-25 DIAGNOSIS — G25.81 RESTLESS LEG SYNDROME: ICD-10-CM

## 2022-01-25 DIAGNOSIS — F32.A DEPRESSION, UNSPECIFIED DEPRESSION TYPE: ICD-10-CM

## 2022-01-25 DIAGNOSIS — M25.559 HIP PAIN: ICD-10-CM

## 2022-01-25 DIAGNOSIS — J44.9 CHRONIC OBSTRUCTIVE PULMONARY DISEASE, UNSPECIFIED COPD TYPE: ICD-10-CM

## 2022-01-25 DIAGNOSIS — I50.32 CHRONIC DIASTOLIC CONGESTIVE HEART FAILURE: ICD-10-CM

## 2022-01-25 DIAGNOSIS — M25.50 MULTIPLE JOINT PAIN: ICD-10-CM

## 2022-01-25 DIAGNOSIS — E78.2 MIXED HYPERLIPIDEMIA: ICD-10-CM

## 2022-01-25 PROCEDURE — 99214 OFFICE O/P EST MOD 30 MIN: CPT | Performed by: INTERNAL MEDICINE

## 2022-01-25 RX ORDER — GABAPENTIN 800 MG/1
800 TABLET ORAL 3 TIMES DAILY
COMMUNITY
End: 2022-05-26 | Stop reason: SDUPTHER

## 2022-01-25 RX ORDER — HYDROCODONE BITARTRATE AND ACETAMINOPHEN 5; 325 MG/1; MG/1
1 TABLET ORAL AS NEEDED
COMMUNITY
End: 2022-04-03

## 2022-01-25 NOTE — PROGRESS NOTES
"Chief Complaint/ HPI:     F/U WITH DM AND BACK PAIN , AND RLS    ? RE CHOL NUMBERS     TOES AND L FOOT PAIN     Objective   Vital Signs  Vitals:    01/25/22 1529   BP: 120/71   Pulse: 97   Temp: 99.7 °F (37.6 °C)   SpO2: 99%   Weight: 76.7 kg (169 lb)   Height: 149.9 cm (59.02\")      Body mass index is 34.12 kg/m².  Review of Systems   Musculoskeletal: Positive for arthralgias.      Physical Exam  Constitutional:       General: She is not in acute distress.     Appearance: Normal appearance.   HENT:      Head: Normocephalic.      Mouth/Throat:      Mouth: Mucous membranes are moist.   Eyes:      Conjunctiva/sclera: Conjunctivae normal.      Pupils: Pupils are equal, round, and reactive to light.   Cardiovascular:      Rate and Rhythm: Normal rate and regular rhythm.      Pulses: Normal pulses.      Heart sounds: Normal heart sounds.   Pulmonary:      Effort: Pulmonary effort is normal.      Breath sounds: Rhonchi and rales present.   Abdominal:      General: Abdomen is flat. Bowel sounds are normal.      Palpations: Abdomen is soft.   Musculoskeletal:         General: No swelling. Normal range of motion.      Cervical back: Neck supple.      Right lower leg: Edema present.      Left lower leg: Edema present.   Skin:     General: Skin is warm and dry.      Coloration: Skin is not jaundiced.   Neurological:      General: No focal deficit present.      Mental Status: She is alert and oriented to person, place, and time. Mental status is at baseline.   Psychiatric:         Mood and Affect: Mood normal.         Behavior: Behavior normal.         Thought Content: Thought content normal.         Judgment: Judgment normal.      L FOOT SWELLING , WITH great toe lateral deviation, patient has no palpable DP or PT pulse on the left foot left foot toes are slightly blue purplish color at the tips with some dystrophic nails, tender to palpation, there is some swelling of the left foot and the right foot 1+ bilateral,  Result " Review :   Lab Results   Component Value Date    PROBNP 44.6 10/11/2021    BNP 80 05/20/2019     05/16/2019    BNP 4275 (H) 05/12/2019     CMP    CMP 9/30/21 10/11/21 1/24/22   Glucose  63 (A) 79   BUN  14 11   Creatinine 0.90 0.77 0.86   eGFR Non African Am  72 64   Sodium  141 141   Potassium  4.5 4.2   Chloride  100 103   Calcium  9.2 9.4   Albumin  4.60 4.30   Total Bilirubin  0.4 0.2   Alkaline Phosphatase  71 70   AST (SGOT)  32 36 (A)   ALT (SGPT)  15 14   (A) Abnormal value       Comments are available for some flowsheets but are not being displayed.           CBC w/diff    CBC w/Diff 10/11/21   WBC 5.74   RBC 4.88   Hemoglobin 14.2   Hematocrit 43.5   MCV 89.1   MCH 29.1   MCHC 32.6   RDW 14.3   Platelets 231   Neutrophil Rel % 50.2   Immature Granulocyte Rel % 0.3   Lymphocyte Rel % 38.5   Monocyte Rel % 8.4   Eosinophil Rel % 2.4   Basophil Rel % 0.2            Lipid Panel    Lipid Panel 10/11/21 1/24/22   Total Cholesterol 162 123   Triglycerides 164 (A) 173 (A)   HDL Cholesterol 32 (A) 34 (A)   VLDL Cholesterol 29 29   LDL Cholesterol  101 (A) 60   LDL/HDL Ratio 3.04 1.60   (A) Abnormal value             Lab Results   Component Value Date    TSH 1.650 10/11/2021    TSH 1.490 05/10/2019      Lab Results   Component Value Date    FREET4 1.4 05/10/2019      A1C Last 3 Results    HGBA1C Last 3 Results 1/24/22   Hemoglobin A1C 6.85 (A)   (A) Abnormal value                              Visit Diagnoses:    ICD-10-CM ICD-9-CM   1. Spinal stenosis of lumbar region with neurogenic claudication  M48.062 724.03   2. Type 2 diabetes mellitus with diabetic neuropathy, without long-term current use of insulin ([facility])  E11.40 250.60     357.2   3. Hip pain  M25.559 719.45   4. Degeneration of lumbar intervertebral disc  M51.36 722.52   5. Chronic obstructive pulmonary disease, unspecified COPD type ([facility])  J44.9 496   6. Multiple joint pain  M25.50 719.49   7. Depression, unspecified depression type  F32.A 311   8.  Chronic diastolic congestive heart failure (HCC)  I50.32 428.32     428.0   9. Obesity (BMI 30-39.9)  E66.9 278.00   10. Restless leg syndrome  G25.81 333.94   11. Mixed hyperlipidemia  E78.2 272.2   12. Vitamin D deficiency  E55.9 268.9       Assessment and Plan   Diagnoses and all orders for this visit:    1. Spinal stenosis of lumbar region with neurogenic claudication (Primary)  -     Doppler Arterial Multi Level Lower Extremity - Bilateral; Future  -     Hemoglobin A1c; Future  -     Comprehensive Metabolic Panel; Future  -     CBC & Differential; Future  -     Lipid Panel; Future  -     TSH+Free T4; Future  -     proBNP; Future  -     Vitamin D 25 Hydroxy; Future  -     Vitamin B12 anemia; Future  -     Folate anemia; Future    2. Type 2 diabetes mellitus with diabetic neuropathy, without long-term current use of insulin (HCC)  -     Doppler Arterial Multi Level Lower Extremity - Bilateral; Future  -     Hemoglobin A1c; Future  -     Comprehensive Metabolic Panel; Future  -     CBC & Differential; Future  -     Lipid Panel; Future  -     TSH+Free T4; Future  -     proBNP; Future  -     Vitamin D 25 Hydroxy; Future  -     Vitamin B12 anemia; Future  -     Folate anemia; Future    3. Hip pain  -     Doppler Arterial Multi Level Lower Extremity - Bilateral; Future  -     Hemoglobin A1c; Future  -     Comprehensive Metabolic Panel; Future  -     CBC & Differential; Future  -     Lipid Panel; Future  -     TSH+Free T4; Future  -     proBNP; Future  -     Vitamin D 25 Hydroxy; Future  -     Vitamin B12 anemia; Future  -     Folate anemia; Future    4. Degeneration of lumbar intervertebral disc  -     Doppler Arterial Multi Level Lower Extremity - Bilateral; Future  -     Hemoglobin A1c; Future  -     Comprehensive Metabolic Panel; Future  -     CBC & Differential; Future  -     Lipid Panel; Future  -     TSH+Free T4; Future  -     proBNP; Future  -     Vitamin D 25 Hydroxy; Future  -     Vitamin B12 anemia;  Future  -     Folate anemia; Future    5. Chronic obstructive pulmonary disease, unspecified COPD type (HCC)  -     Doppler Arterial Multi Level Lower Extremity - Bilateral; Future  -     Hemoglobin A1c; Future  -     Comprehensive Metabolic Panel; Future  -     CBC & Differential; Future  -     Lipid Panel; Future  -     TSH+Free T4; Future  -     proBNP; Future  -     Vitamin D 25 Hydroxy; Future  -     Vitamin B12 anemia; Future  -     Folate anemia; Future    6. Multiple joint pain  -     Doppler Arterial Multi Level Lower Extremity - Bilateral; Future  -     Hemoglobin A1c; Future  -     Comprehensive Metabolic Panel; Future  -     CBC & Differential; Future  -     Lipid Panel; Future  -     TSH+Free T4; Future  -     proBNP; Future  -     Vitamin D 25 Hydroxy; Future  -     Vitamin B12 anemia; Future  -     Folate anemia; Future    7. Depression, unspecified depression type  -     Doppler Arterial Multi Level Lower Extremity - Bilateral; Future  -     Hemoglobin A1c; Future  -     Comprehensive Metabolic Panel; Future  -     CBC & Differential; Future  -     Lipid Panel; Future  -     TSH+Free T4; Future  -     proBNP; Future  -     Vitamin D 25 Hydroxy; Future  -     Vitamin B12 anemia; Future  -     Folate anemia; Future    8. Chronic diastolic congestive heart failure (HCC)  -     Doppler Arterial Multi Level Lower Extremity - Bilateral; Future  -     Hemoglobin A1c; Future  -     Comprehensive Metabolic Panel; Future  -     CBC & Differential; Future  -     Lipid Panel; Future  -     TSH+Free T4; Future  -     proBNP; Future  -     Vitamin D 25 Hydroxy; Future  -     Vitamin B12 anemia; Future  -     Folate anemia; Future    9. Obesity (BMI 30-39.9)  -     Doppler Arterial Multi Level Lower Extremity - Bilateral; Future  -     Hemoglobin A1c; Future  -     Comprehensive Metabolic Panel; Future  -     CBC & Differential; Future  -     Lipid Panel; Future  -     TSH+Free T4; Future  -     proBNP; Future  -      Vitamin D 25 Hydroxy; Future  -     Vitamin B12 anemia; Future  -     Folate anemia; Future    10. Restless leg syndrome  -     Doppler Arterial Multi Level Lower Extremity - Bilateral; Future  -     Hemoglobin A1c; Future  -     Comprehensive Metabolic Panel; Future  -     CBC & Differential; Future  -     Lipid Panel; Future  -     TSH+Free T4; Future  -     proBNP; Future  -     Vitamin D 25 Hydroxy; Future  -     Vitamin B12 anemia; Future  -     Folate anemia; Future    11. Mixed hyperlipidemia  -     Doppler Arterial Multi Level Lower Extremity - Bilateral; Future  -     Hemoglobin A1c; Future  -     Comprehensive Metabolic Panel; Future  -     CBC & Differential; Future  -     Lipid Panel; Future  -     TSH+Free T4; Future  -     proBNP; Future  -     Vitamin D 25 Hydroxy; Future  -     Vitamin B12 anemia; Future  -     Folate anemia; Future    12. Vitamin D deficiency  -     Doppler Arterial Multi Level Lower Extremity - Bilateral; Future  -     Hemoglobin A1c; Future  -     Comprehensive Metabolic Panel; Future  -     CBC & Differential; Future  -     Lipid Panel; Future  -     TSH+Free T4; Future  -     proBNP; Future  -     Vitamin D 25 Hydroxy; Future  -     Vitamin B12 anemia; Future  -     Folate anemia; Future        Type 2 diabetes hemoglobin A1c 6.8,--continues metformin 850 MG QD     Frequent loose stools, incontinence at times, discussed changing Metformin to extended release, continues on colestipol since she has had her gallbladder removed,    Peripheral arterial disease left foot pain will check arterial evaluation left leg and right leg, consider vascular surgery opinion,    Lumbar spinal stenosis previous work-up with [facility] [place] December 2021, was referred to pain management for trigger point injections    Previous left total knee arthroplasty, laparoscopic cholecystectomy March 2016, previous colonoscopies polypectomies, right breast biopsy, right hip surgery, lap band  surgery, ADRIANO, recent back surgery July 2018,    Chronic pain, continue Cymbalta,    Depression anxiety, continues on Cymbalta 60 mg daily    Peripheral neuropathy lower legs,---CONT GABAPENTIN 600 MG TID     Previous pelvic fractures    Diverticulosis and diverticulitis in September 2015    C. difficile colitis diagnosed February 2016 treated resolved    History of small bowel enteritis small bowel perforation resolved 2016    Kidney stones    Alcohol use in the past,    Previous ERCP Common bile duct stone cholelithiasis choledocholithiasis with stent placement and sphincterotomy February 29, 2016, status post laparoscopic cholecystectomy Dr. Huizar    History of urinary tract infection    Paroxysmal atrial fibrillation May 2019 with RVR, clinically in sinus rhythm    Hypertension, continues metoprolol XL 25 mg daily,    Obstructive sleep apnea    Chronic lower extremity edema, continues on Lasix 40 mg daily, potassium supplements,    Elevated cholesterol continues on simvastatin 40 mg daily        Follow Up   Return in about 4 months (around 5/25/2022).  Patient was given instructions and counseling regarding her condition or for health maintenance advice. Please see specific information pulled into the AVS if appropriate.

## 2022-01-31 DIAGNOSIS — I73.9 PERIPHERAL ARTERIAL DISEASE: Primary | ICD-10-CM

## 2022-02-01 ENCOUNTER — LAB (OUTPATIENT)
Dept: LAB | Facility: HOSPITAL | Age: 77
End: 2022-02-01

## 2022-02-01 DIAGNOSIS — J44.9 CHRONIC OBSTRUCTIVE PULMONARY DISEASE, UNSPECIFIED COPD TYPE: ICD-10-CM

## 2022-02-01 DIAGNOSIS — E55.9 VITAMIN D DEFICIENCY: ICD-10-CM

## 2022-02-01 DIAGNOSIS — M25.559 HIP PAIN: ICD-10-CM

## 2022-02-01 DIAGNOSIS — M48.062 SPINAL STENOSIS OF LUMBAR REGION WITH NEUROGENIC CLAUDICATION: ICD-10-CM

## 2022-02-01 DIAGNOSIS — M51.36 DEGENERATION OF LUMBAR INTERVERTEBRAL DISC: ICD-10-CM

## 2022-02-01 DIAGNOSIS — F32.A DEPRESSION, UNSPECIFIED DEPRESSION TYPE: ICD-10-CM

## 2022-02-01 DIAGNOSIS — G25.81 RESTLESS LEG SYNDROME: ICD-10-CM

## 2022-02-01 DIAGNOSIS — E66.9 OBESITY (BMI 30-39.9): ICD-10-CM

## 2022-02-01 DIAGNOSIS — I50.32 CHRONIC DIASTOLIC CONGESTIVE HEART FAILURE: ICD-10-CM

## 2022-02-01 DIAGNOSIS — M25.50 MULTIPLE JOINT PAIN: ICD-10-CM

## 2022-02-01 DIAGNOSIS — E78.2 MIXED HYPERLIPIDEMIA: ICD-10-CM

## 2022-02-01 DIAGNOSIS — E11.40 TYPE 2 DIABETES MELLITUS WITH DIABETIC NEUROPATHY, WITHOUT LONG-TERM CURRENT USE OF INSULIN: ICD-10-CM

## 2022-02-01 LAB — VIT B12 BLD-MCNC: 305 PG/ML (ref 211–946)

## 2022-02-01 PROCEDURE — 36415 COLL VENOUS BLD VENIPUNCTURE: CPT

## 2022-02-01 PROCEDURE — 82607 VITAMIN B-12: CPT

## 2022-02-03 ENCOUNTER — TELEPHONE (OUTPATIENT)
Dept: INTERNAL MEDICINE | Facility: CLINIC | Age: 77
End: 2022-02-03

## 2022-02-03 RX ORDER — SIMVASTATIN 40 MG
40 TABLET ORAL NIGHTLY
Qty: 90 TABLET | Refills: 3 | Status: SHIPPED | OUTPATIENT
Start: 2022-02-03 | End: 2022-04-22

## 2022-02-03 RX ORDER — DULOXETIN HYDROCHLORIDE 60 MG/1
60 CAPSULE, DELAYED RELEASE ORAL DAILY
Qty: 90 CAPSULE | Refills: 3 | Status: SHIPPED | OUTPATIENT
Start: 2022-02-03 | End: 2022-04-22

## 2022-02-03 NOTE — TELEPHONE ENCOUNTER
Caller: BREAUX    Relationship: Provider /[facility]  Best call back number: [phone]    Requested Prescriptions:   Requested Prescriptions     Pending Prescriptions Disp Refills   • DULoxetine (Cymbalta) 60 MG capsule       Sig: Take 1 capsule by mouth Daily.   • simvastatin (ZOCOR) 40 MG tablet       Sig: Take  by mouth.        Pharmacy where request should be sent: [facility] [address] [phone] [facility] [phone] FX     Additional details provided by patient: PATIENT IS OUT OF MEDICATION AND WOULD LIKE A CALL WHEN THESE REFILLS ARE [phone]  Does the patient have less than a 3 day supply:  [x] Yes  [] No    Radha Tavera Rep   02/03/22 10:09 EST

## 2022-02-07 ENCOUNTER — TELEPHONE (OUTPATIENT)
Dept: INTERNAL MEDICINE | Facility: CLINIC | Age: 77
End: 2022-02-07

## 2022-02-07 NOTE — TELEPHONE ENCOUNTER
Tell patient we were going to consider referring her to a vascular surgeon but we have to see the arterial study on her legs first and when those are done tell her to call us so that we can go over the test results with her

## 2022-02-07 NOTE — TELEPHONE ENCOUNTER
The pt states that you were going to refer her to a doctor for her legs and feet? Olga looked in her chart and I dont see any referral. I see a doppler test scheduled. I dont know if that's what she is talking about or not.

## 2022-02-09 ENCOUNTER — OFFICE VISIT (OUTPATIENT)
Dept: GASTROENTEROLOGY | Facility: CLINIC | Age: 77
End: 2022-02-09

## 2022-02-09 VITALS
BODY MASS INDEX: 34.68 KG/M2 | DIASTOLIC BLOOD PRESSURE: 69 MMHG | HEIGHT: 59 IN | SYSTOLIC BLOOD PRESSURE: 125 MMHG | WEIGHT: 172 LBS | HEART RATE: 64 BPM

## 2022-02-09 DIAGNOSIS — Z86.010 HISTORY OF COLON POLYPS: Primary | ICD-10-CM

## 2022-02-09 DIAGNOSIS — Z80.0 FAMILY HISTORY OF COLON CANCER: ICD-10-CM

## 2022-02-09 DIAGNOSIS — K57.90 DIVERTICULOSIS: ICD-10-CM

## 2022-02-09 DIAGNOSIS — R19.7 DIARRHEA, UNSPECIFIED TYPE: ICD-10-CM

## 2022-02-09 DIAGNOSIS — Z12.11 SCREENING FOR COLON CANCER: ICD-10-CM

## 2022-02-09 PROBLEM — Z86.0100 HISTORY OF COLON POLYPS: Status: ACTIVE | Noted: 2022-02-09

## 2022-02-09 PROCEDURE — 99214 OFFICE O/P EST MOD 30 MIN: CPT | Performed by: NURSE PRACTITIONER

## 2022-02-09 RX ORDER — OMEPRAZOLE 20 MG/1
20 CAPSULE, DELAYED RELEASE ORAL DAILY
COMMUNITY
Start: 2022-01-28 | End: 2022-03-04

## 2022-02-09 RX ORDER — POLYETHYLENE GLYCOL 3350, SODIUM SULFATE ANHYDROUS, SODIUM BICARBONATE, SODIUM CHLORIDE, POTASSIUM CHLORIDE 227.1; 21.5; 6.36; 5.53; .754 G/L; G/L; G/L; G/L; G/L
4000 POWDER, FOR SOLUTION ORAL ONCE
Qty: 1 EACH | Refills: 0 | Status: SHIPPED | OUTPATIENT
Start: 2022-02-09 | End: 2022-02-09

## 2022-02-09 NOTE — PROGRESS NOTES
Patient Name: Ivania Mattson   Visit Date: 02/09/2022   Patient ID: [id number]  Provider: JIN Wynne    Sex: female  Location:  Location Address:  Location Phone: [address] [facility] [place] [address] [phone]    YOB: 1945  Age: 76 y.o.   Primary Care Provider Dillon Gardner MD      Referring Provider: Dillon Gardner, *        Chief Complaint  Colonoscopy (Consult-Overdue) and Diarrhea (Patient states sx on and off for a few years)    History of Present Illness  Patient has not been seen in the office since 2018 at that time she was having complaints of chronic diarrhea with fecal incontinence, she was given Imodium, she has taken Colestid in the past as well    Colonoscopy 1/22/2018: Adequate prep, diverticula seen in the sigmoid, 2 small adenomatous polyps in the ascending colon, 3 small adenomatous polyps in the descending colon, internal hemorrhoids random colon biopsies negative    Pt presents today to discuss setting up colonoscopy. Denies hematochezia, and melena. Has alternating bowel habits between diarrhea and normal stool or constipation. [place] stool #7 with diarrhea. Has fecal urgency and FI at times. Has abdominal cramping before bowel movement if diarrhea. Weight has been stable. History of lap band surgery.     Brother dx of colon cancer at age 74. Nephew had colon cancer at age 34.     Denies being followed by cardiologist and pulmonologist.    Past Medical History:   Diagnosis Date   • Acute pancreatitis    • Adenomatous polyp of stomach    • Allergy    • Alopecia    • C. difficile colitis    • Chronic lower back pain    • Depression    • Diabetes mellitus (HCC)    • Diabetes type 2, uncontrolled (HCC)    • Difficulty breathing     At rest   • Edema    • Fatigue    • Heartburn    • Hiatal hernia    • History of colonoscopy     Polyp   • Hypercholesterolemia    • Insomnia    • Knee joint pain     Bilateral   • Loss of hair    • Morbid obesity  "([facility])    • Numbness    • Polyphagia(783.6)    • Rash     skin folds   • Regurgitation    • Sinus drainage    • Skin cancer    • Skin rash     Beneath both breasts   • Sleep apnea    • Snoring    • Stress incontinence     Temporary urinary loss of control with cough and sneeze   • Tingling    • Wears dentures    • Wears glasses        Past Surgical History:   Procedure Laterality Date   • BLADDER SURGERY      Tack   • HYSTERECTOMY     • KNEE SURGERY Left    • LAPAROSCOPIC CHOLECYSTECTOMY     • LAPAROSCOPIC GASTRIC BANDING         Allergies   Allergen Reactions   • Morphine Shortness Of Breath       Family History   Problem Relation Age of Onset   • Hypertension Mother    • Heart attack Father    • Obesity Brother         Morbis   • Colon cancer Brother    • Colon cancer Other         Social History     Tobacco Use   • Smoking status: Former Smoker     Types: Cigarettes   • Smokeless tobacco: Never Used   • Tobacco comment: quit 3 months ago   Vaping Use   • Vaping Use: Never used   Substance Use Topics   • Alcohol use: No   • Drug use: No       Objective     Vital Signs:   /69 (BP Location: Right arm, Patient Position: Sitting, Cuff Size: Adult)   Pulse 64   Ht 149.9 cm (59.02\")   Wt 78 kg (172 lb)   BMI 34.72 kg/m²       Physical Exam  Constitutional:       General: The patient is not in acute distress.     Appearance: Normal appearance.   HENT:      Head: Normocephalic and atraumatic.      Nose: Nose normal.   Pulmonary:      Effort: Pulmonary effort is normal. No respiratory distress.   Abdominal:      General: Abdomen is flat.      Palpations: Abdomen is soft. There is no mass.      Tenderness: There is no abdominal tenderness. There is no guarding.   Musculoskeletal:      Cervical back: Neck supple.      Right lower leg: No edema.      Left lower leg: No edema.   Skin:     General: Skin is warm and dry.   Neurological:      General: No focal deficit present.      Mental Status: The patient is alert and " oriented to person, place, and time.      Gait: Uses rolling walker  Psychiatric:         Mood and Affect: Mood normal.         Speech: Speech normal.         Behavior: Behavior normal.         Thought Content: Thought content normal.     Result Review :   The following data was reviewed by: JIN Wynne on 02/09/2022:                    Assessment and Plan    Diagnoses and all orders for this visit:    1. History of colon polyps (Primary)  -     PEG 3350-KCl-NaBcb-NaCl-NaSulf (Golytely) 227.1 g pack; Take 4,000 mL by mouth 1 (One) Time for 1 dose. Take as directed by the office for colon prep  Dispense: 1 each; Refill: 0  -     Case Request; Standing  -     Case Request    2. Family history of colon cancer  -     PEG 3350-KCl-NaBcb-NaCl-NaSulf (Golytely) 227.1 g pack; Take 4,000 mL by mouth 1 (One) Time for 1 dose. Take as directed by the office for colon prep  Dispense: 1 each; Refill: 0  -     Case Request; Standing  -     Case Request    3. Screening for colon cancer  -     PEG 3350-KCl-NaBcb-NaCl-NaSulf (Golytely) 227.1 g pack; Take 4,000 mL by mouth 1 (One) Time for 1 dose. Take as directed by the office for colon prep  Dispense: 1 each; Refill: 0  -     Case Request; Standing  -     Case Request    4. Diarrhea, unspecified type  Comments:  intermittent  Orders:  -     Case Request; Standing  -     Case Request    5. Diverticulosis    Other orders  -     Follow Anesthesia Guidelines / Protocol; Future  -     Obtain Informed Consent; Future            Follow Up   Return if symptoms worsen or fail to improve.   Pt aware of no screening guidelines after age 75, and that we usually stop screening at age 85. Pt understands and is agreeable to proceed.   Encouraged fiber supplement to help regulate and bulk stool. Discussed high fiber foods.   Colonoscopy Surgical Risk and Benefits: Possible risk/complications, benefits, and alternatives to surgical or invasive procedure have been explained to  patient and/or legal guardian. Risks include bleeding, infection, and perforation. Patient has been evaluated and can tolerate anesthesia and/or sedation. Risk, benefits, and alternatives to anesthesia and sedation have been explained to patient and/or legal guardian.       Patient was given instructions and counseling regarding her condition or for health maintenance advice. Please see specific information pulled into the AVS if appropriate.

## 2022-02-14 ENCOUNTER — PREP FOR SURGERY (OUTPATIENT)
Dept: OTHER | Facility: HOSPITAL | Age: 77
End: 2022-02-14

## 2022-02-14 DIAGNOSIS — Z86.010 HISTORY OF COLON POLYPS: ICD-10-CM

## 2022-02-14 DIAGNOSIS — Z80.0 FAMILY HISTORY OF COLON CANCER: Primary | ICD-10-CM

## 2022-02-14 DIAGNOSIS — Z12.11 SCREENING FOR COLON CANCER: ICD-10-CM

## 2022-02-17 ENCOUNTER — TELEPHONE (OUTPATIENT)
Dept: INTERNAL MEDICINE | Facility: CLINIC | Age: 77
End: 2022-02-17

## 2022-02-17 ENCOUNTER — PATIENT ROUNDING (BHMG ONLY) (OUTPATIENT)
Dept: GASTROENTEROLOGY | Facility: CLINIC | Age: 77
End: 2022-02-17

## 2022-02-17 NOTE — TELEPHONE ENCOUNTER
Janis I see you are working on this. I spoke with her yesterday, I called to let her know that they were trying to figure out which referral to order however she didn't answer, LVM, messages are in in referral. We are waiting on Dr. Gardner. Tuyet is aware.

## 2022-02-17 NOTE — TELEPHONE ENCOUNTER
Caller: Ivania Mattson    Relationship: Self    Best call back number: [phone]    What is the best time to reach you: ANYTIME    Who are you requesting to speak with (clinical staff, provider,  specific staff member): CLINICAL     What was the call regarding: PATIENT IS CALLING IN REGARDS TO THE REFERRAL FOR HER LEGS AND FEET. PATIENT STATES SHE HAS BEEN WAITING FOR 2 WEEKS AND HASNT HEARD ANYTHING. SHE STATES SHE SPOKE TO A NURSE YESTERDAY AND THEY TOLD HER THEY WOULD CALL HER BACK YESTERDAY. SHE IS VERY UPSET ABOUT THIS AND WAS GETTING A LITTLE RUDE. SHE DEMANDED TO HAVE A CALL BACK TODAY BEFORE LEAVING. ADVISED THAT WE CLOSE AT 4:30 AND THAT WE MAY NOT BE ABLE TO GET BACK WITH HER TODAY.     Do you require a callback: YES

## 2022-02-17 NOTE — PROGRESS NOTES
February 17, 2022    Hello, may I speak with Ivania Mattson?    My name is Violet PRESCOTT      I am  with [facility]  [address]  [place] [address] [phone].    Before we get started may I verify your date of birth? 1945    I am calling to officially welcome you to our practice and ask about your recent visit. Is this a good time to talk? No-voicemail left     Tell me about your visit with us. What things went well?         We're always looking for ways to make our patients' experiences even better. Do you have recommendations on ways we may improve?     Overall were you satisfied with your first visit to our practice?        I appreciate you taking the time to speak with me today. Is there anything else I can do for you?       Thank you, and have a great day.

## 2022-03-04 ENCOUNTER — TELEPHONE (OUTPATIENT)
Dept: GASTROENTEROLOGY | Facility: CLINIC | Age: 77
End: 2022-03-04

## 2022-03-04 NOTE — PRE-PROCEDURE INSTRUCTIONS
PT INSTRUCTED ON CLEAR LIQ DIET AND PO SPLIT PREP LAST BM SHOULD BE CLEAR  PT CAN TAKE GABAPENTIN, AND  REQUIP   WITH A SIP OF WATER IN THE AM OF THE PROCEDURE ARRIVAL TIME 1000 AM ON Tuesday MARCH 8TH BLOOD SUGAR ORDERED

## 2022-03-08 ENCOUNTER — HOSPITAL ENCOUNTER (OUTPATIENT)
Facility: HOSPITAL | Age: 77
Setting detail: HOSPITAL OUTPATIENT SURGERY
Discharge: HOME | End: 2022-03-08
Attending: INTERNAL MEDICINE | Admitting: INTERNAL MEDICINE

## 2022-03-08 ENCOUNTER — ANESTHESIA EVENT (OUTPATIENT)
Dept: GASTROENTEROLOGY | Facility: HOSPITAL | Age: 77
End: 2022-03-08

## 2022-03-08 ENCOUNTER — ANESTHESIA (OUTPATIENT)
Dept: GASTROENTEROLOGY | Facility: HOSPITAL | Age: 77
End: 2022-03-08

## 2022-03-08 VITALS
DIASTOLIC BLOOD PRESSURE: 82 MMHG | OXYGEN SATURATION: 96 % | WEIGHT: 171.96 LBS | HEART RATE: 81 BPM | BODY MASS INDEX: 34.71 KG/M2 | RESPIRATION RATE: 18 BRPM | TEMPERATURE: 97.8 F | SYSTOLIC BLOOD PRESSURE: 125 MMHG

## 2022-03-08 DIAGNOSIS — Z80.0 FAMILY HISTORY OF COLON CANCER: ICD-10-CM

## 2022-03-08 DIAGNOSIS — Z12.11 SCREENING FOR COLON CANCER: ICD-10-CM

## 2022-03-08 DIAGNOSIS — Z86.010 HISTORY OF COLON POLYPS: ICD-10-CM

## 2022-03-08 PROCEDURE — 25010000002 PROPOFOL 10 MG/ML EMULSION: Performed by: NURSE ANESTHETIST, CERTIFIED REGISTERED

## 2022-03-08 PROCEDURE — 45385 COLONOSCOPY W/LESION REMOVAL: CPT | Performed by: INTERNAL MEDICINE

## 2022-03-08 PROCEDURE — 88305 TISSUE EXAM BY PATHOLOGIST: CPT | Performed by: INTERNAL MEDICINE

## 2022-03-08 RX ORDER — SODIUM CHLORIDE, SODIUM LACTATE, POTASSIUM CHLORIDE, CALCIUM CHLORIDE 600; 310; 30; 20 MG/100ML; MG/100ML; MG/100ML; MG/100ML
30 INJECTION, SOLUTION INTRAVENOUS CONTINUOUS
Status: DISCONTINUED | OUTPATIENT
Start: 2022-03-08 | End: 2022-03-08 | Stop reason: HOSPADM

## 2022-03-08 RX ORDER — PROPOFOL 10 MG/ML
VIAL (ML) INTRAVENOUS AS NEEDED
Status: DISCONTINUED | OUTPATIENT
Start: 2022-03-08 | End: 2022-03-08 | Stop reason: SURG

## 2022-03-08 RX ORDER — LIDOCAINE HYDROCHLORIDE 20 MG/ML
INJECTION, SOLUTION INFILTRATION; PERINEURAL AS NEEDED
Status: DISCONTINUED | OUTPATIENT
Start: 2022-03-08 | End: 2022-03-08 | Stop reason: SURG

## 2022-03-08 RX ADMIN — PROPOFOL 50 MG: 10 INJECTION, EMULSION INTRAVENOUS at 12:11

## 2022-03-08 RX ADMIN — LIDOCAINE HYDROCHLORIDE 100 MG: 20 INJECTION, SOLUTION INFILTRATION; PERINEURAL at 12:11

## 2022-03-08 RX ADMIN — PROPOFOL 75 MCG/KG/MIN: 10 INJECTION, EMULSION INTRAVENOUS at 12:11

## 2022-03-08 RX ADMIN — SODIUM CHLORIDE, POTASSIUM CHLORIDE, SODIUM LACTATE AND CALCIUM CHLORIDE 30 ML/HR: 600; 310; 30; 20 INJECTION, SOLUTION INTRAVENOUS at 10:47

## 2022-03-08 NOTE — ANESTHESIA POSTPROCEDURE EVALUATION
Patient: Ivania Mattson    Procedure Summary     Date: 03/08/22 Room / Location: [facility] ENDOSCOPY 3 / [facility] ENDOSCOPY    Anesthesia Start: 1209 Anesthesia Stop: 1250    Procedure: COLONOSCOPY WITH POLYPECTOMY (N/A ) Diagnosis:       Family history of colon cancer      History of colon polyps      Screening for colon cancer      (Family history of colon cancer [Z80.0])      (History of colon polyps [Z86.010])      (Screening for colon cancer [Z12.11])    Surgeons: Kody Pickard MD Provider: Wilfredo Galvan MD    Anesthesia Type: general ASA Status: 3          Anesthesia Type: general    Vitals  Vitals Value Taken Time   /83 03/08/22 1248   Temp 36.4 °C (97.5 °F) 03/08/22 1248   Pulse 85 03/08/22 1248   Resp 16 03/08/22 1248   SpO2 97 % 03/08/22 1248           Post Anesthesia Care and Evaluation    Patient location during evaluation: bedside  Patient participation: complete - patient participated  Level of consciousness: awake  Pain score: 0  Pain management: adequate  Airway patency: patent  Anesthetic complications: No anesthetic complications  PONV Status: none  Cardiovascular status: acceptable and stable  Respiratory status: acceptable and room air  Hydration status: acceptable    Comments: An Anesthesiologist personally participated in the most demanding procedures (including induction and emergence if applicable) in the anesthesia plan, monitored the course of anesthesia administration at frequent intervals and remained physically present and available for immediate diagnosis and treatment of emergencies.

## 2022-03-08 NOTE — H&P
Pre Procedure History & Physical    Chief Complaint:   Past history colon polyp    Subjective     HPI:   Past history colon polyps and family history colon cancer    Past Medical History:   Past Medical History:   Diagnosis Date   • Acute pancreatitis    • Adenomatous polyp of stomach    • Allergy    • Alopecia    • C. difficile colitis    • Chronic lower back pain    • Depression    • Diabetes mellitus (HCC)    • Diabetes type 2, uncontrolled (HCC)    • Difficulty breathing     At rest   • Edema    • Fatigue    • Heartburn    • Hiatal hernia    • History of colonoscopy     Polyp   • Hypercholesterolemia    • Insomnia    • Knee joint pain     Bilateral   • Loss of hair    • Morbid obesity (HCC)    • Numbness    • Polyphagia(783.6)    • Rash     skin folds   • Regurgitation    • Sinus drainage    • Skin cancer    • Skin rash     Beneath both breasts   • Sleep apnea    • Snoring    • Stress incontinence     Temporary urinary loss of control with cough and sneeze   • Tingling    • Wears dentures    • Wears glasses        Past Surgical History:  Past Surgical History:   Procedure Laterality Date   • BLADDER SURGERY      Tack   • HYSTERECTOMY     • KNEE SURGERY Left    • LAPAROSCOPIC CHOLECYSTECTOMY     • LAPAROSCOPIC GASTRIC BANDING         Family History:  Family History   Problem Relation Age of Onset   • Hypertension Mother    • Heart attack Father    • Obesity Brother         Morbis   • Colon cancer Brother    • Colon cancer Other        Social History:   reports that she has quit smoking. Her smoking use included cigarettes. She has never used smokeless tobacco. She reports that she does not drink alcohol and does not use drugs.    Medications:   Medications Prior to Admission   Medication Sig Dispense Refill Last Dose   • Accu-Chek Softclix Lancets lancets 1 each by Other route As Needed.   Past Week at Unknown time   • aspirin 81 MG tablet Take 81 mg by mouth Daily. LAST DOSE MARCH 1ST   Past Week at Unknown time    • Blood Glucose Calibration (Accu-Chek Lyndsey) solution    Past Week at Unknown time   • colestipol (COLESTID) 1 g tablet Take 1 g by mouth Every Night.   3/7/2022 at 1800   • DULoxetine (Cymbalta) 60 MG capsule Take 1 capsule by mouth Daily. (Patient taking differently: Take 60 mg by mouth Every Night.) 90 capsule 3 3/7/2022 at 1800   • furosemide (LASIX) 40 MG tablet Take 1 tablet by mouth Daily. (Patient taking differently: Take 40 mg by mouth Every Night.) 30 tablet 5 Past Week at Unknown time   • gabapentin (NEURONTIN) 800 MG tablet Take 800 mg by mouth 3 (Three) Times a Day.   3/7/2022 at 1800   • glucose blood (Accu-Chek Lyndsey Plus) test strip TEST BLOOD SUGAR THREE TIMES DAILY 100 each 1 Past Week at Unknown time   • HYDROcodone-acetaminophen (NORCO) 5-325 MG per tablet Take 1 tablet by mouth As Needed.   Past Week at Unknown time   • meloxicam (MOBIC) 15 MG tablet Take 15 mg by mouth Every Night.   Past Week at Unknown time   • metFORMIN (GLUCOPHAGE) 850 MG tablet TAKE 1 TABLET TWICE DAILY (Patient taking differently: Take 850 mg by mouth Every Night. HOLD METFORMIN Monday NIGHT) 180 tablet 1 Past Week at Unknown time   • metoprolol succinate XL (TOPROL-XL) 25 MG 24 hr tablet Take 25 mg by mouth Every Night.   Past Week at Unknown time   • polyethylene glycol (GoLYTELY) 236 g solution Starting at noon on day prior to procedure, drink 8 ounces every 30 minutes until all gone or stools are clear. May add flavor packet. 4000 mL 0 3/8/2022 at 0700   • potassium chloride (K-DUR,KLOR-CON) 10 MEQ CR tablet Take 10 mEq by mouth Every Night.   3/7/2022 at 1800   • rOPINIRole (REQUIP) 2 MG tablet TAKE 1 TABLET THREE TIMES DAILY (Patient taking differently: Take 2 mg by mouth 3 (Three) Times a Day.) 270 tablet 1 3/8/2022 at 0600   • simvastatin (ZOCOR) 40 MG tablet Take 1 tablet by mouth Every Night. 90 tablet 3 Past Week at Unknown time       Allergies:  Morphine        Objective     Blood pressure 104/80, pulse 88,  temperature 97.5 °F (36.4 °C), temperature source Temporal, resp. rate 18, weight 78 kg (171 lb 15.3 oz).    Physical Exam   Constitutional: Pt is oriented to person, place, and time and well-developed, well-nourished, and in no distress.   Mouth/Throat: Oropharynx is clear and moist.   Neck: Normal range of motion.   Cardiovascular: Normal rate, regular rhythm and normal heart sounds.    Pulmonary/Chest: Effort normal and breath sounds normal.   Abdominal: Soft. Nontender  Skin: Skin is warm and dry.   Psychiatric: Mood, memory, affect and judgment normal.     Assessment/Plan     Diagnosis:  High risk surveillance and screening    Anticipated Surgical Procedure:  Colonoscopy    The risks, benefits, and alternatives of this procedure have been discussed with the patient or the responsible party- the patient understands and agrees to proceed.

## 2022-03-08 NOTE — ANESTHESIA PREPROCEDURE EVALUATION
Anesthesia Evaluation     Patient summary reviewed and Nursing notes reviewed   no history of anesthetic complications:  NPO Solid Status: > 8 hours  NPO Liquid Status: > 2 hours           Airway   Mallampati: III  TM distance: >3 FB  Neck ROM: full  No difficulty expected  Dental    (+) upper dentures    Pulmonary - normal exam    breath sounds clear to auscultation  (+) COPD, sleep apnea,   Cardiovascular - normal exam  Exercise tolerance: poor (<4 METS)    Rhythm: regular  Rate: normal    (+) hypertension, CHF , hyperlipidemia,       Neuro/Psych  (+) numbness, psychiatric history,    GI/Hepatic/Renal/Endo    (+)  hiatal hernia,  diabetes mellitus type 2,     Musculoskeletal     Abdominal    Substance History - negative use     OB/GYN negative ob/gyn ROS         Other   arthritis,    history of cancer                    Anesthesia Plan    ASA 3     general   (Total IV Anesthesia    Patient understands anesthesia not responsible for dental damage.  )  intravenous induction     Anesthetic plan, all risks, benefits, and alternatives have been provided, discussed and informed consent has been obtained with: patient.    Plan discussed with CRNA.        CODE STATUS:

## 2022-03-09 LAB
CYTO UR: NORMAL
LAB AP CASE REPORT: NORMAL
LAB AP CLINICAL INFORMATION: NORMAL
PATH REPORT.FINAL DX SPEC: NORMAL
PATH REPORT.GROSS SPEC: NORMAL

## 2022-03-15 ENCOUNTER — HOSPITAL ENCOUNTER (OUTPATIENT)
Dept: CARDIOLOGY | Facility: HOSPITAL | Age: 77
Discharge: HOME | End: 2022-03-15
Admitting: INTERNAL MEDICINE

## 2022-03-15 DIAGNOSIS — I73.9 PERIPHERAL ARTERY DISEASE: ICD-10-CM

## 2022-03-15 LAB
BH CV LOWER ARTERIAL LEFT ABI RATIO: 1.03
BH CV LOWER ARTERIAL LEFT DORSALIS PEDIS SYS MAX: 128
BH CV LOWER ARTERIAL LEFT GREAT TOE SYS MAX: 125
BH CV LOWER ARTERIAL LEFT LOW THIGH SYS MAX: 137
BH CV LOWER ARTERIAL LEFT POPLITEAL SYS MAX: 140
BH CV LOWER ARTERIAL LEFT POST TIBIAL SYS MAX: 133
BH CV LOWER ARTERIAL LEFT TBI RATIO: 0.97
BH CV LOWER ARTERIAL RIGHT ABI RATIO: 1.06
BH CV LOWER ARTERIAL RIGHT DORSALIS PEDIS SYS MAX: 136
BH CV LOWER ARTERIAL RIGHT GREAT TOE SYS MAX: 113
BH CV LOWER ARTERIAL RIGHT LOW THIGH SYS MAX: 140
BH CV LOWER ARTERIAL RIGHT POPLITEAL SYS MAX: 146
BH CV LOWER ARTERIAL RIGHT POST TIBIAL SYS MAX: 137
BH CV LOWER ARTERIAL RIGHT TBI RATIO: 0.88
MAXIMAL PREDICTED HEART RATE: 142 BPM
STRESS TARGET HR: 121 BPM
UPPER ARTERIAL LEFT ARM BRACHIAL SYS MAX: 123
UPPER ARTERIAL RIGHT ARM BRACHIAL SYS MAX: 129

## 2022-03-15 PROCEDURE — 93923 UPR/LXTR ART STDY 3+ LVLS: CPT | Performed by: SURGERY

## 2022-03-15 PROCEDURE — 93923 UPR/LXTR ART STDY 3+ LVLS: CPT

## 2022-03-17 ENCOUNTER — TELEPHONE (OUTPATIENT)
Dept: GASTROENTEROLOGY | Facility: CLINIC | Age: 77
End: 2022-03-17

## 2022-03-17 NOTE — TELEPHONE ENCOUNTER
Pt. Placed in recall. Letter sent----- Message from Kody Pickard MD sent at 3/13/2022  7:44 PM EDT -----  Benign polyp  5 year colon recall  Send letter

## 2022-04-03 ENCOUNTER — APPOINTMENT (OUTPATIENT)
Dept: CT IMAGING | Facility: HOSPITAL | Age: 77
End: 2022-04-03

## 2022-04-03 ENCOUNTER — HOSPITAL ENCOUNTER (EMERGENCY)
Facility: HOSPITAL | Age: 77
Discharge: HOME | End: 2022-04-03
Attending: EMERGENCY MEDICINE | Admitting: EMERGENCY MEDICINE

## 2022-04-03 VITALS
SYSTOLIC BLOOD PRESSURE: 127 MMHG | RESPIRATION RATE: 16 BRPM | DIASTOLIC BLOOD PRESSURE: 75 MMHG | OXYGEN SATURATION: 95 % | HEART RATE: 74 BPM | HEIGHT: 59 IN | BODY MASS INDEX: 34.53 KG/M2 | WEIGHT: 171.3 LBS | TEMPERATURE: 97.9 F

## 2022-04-03 DIAGNOSIS — S01.81XA FACIAL LACERATION, INITIAL ENCOUNTER: ICD-10-CM

## 2022-04-03 DIAGNOSIS — S00.83XA CONTUSION OF FACE, INITIAL ENCOUNTER: Primary | ICD-10-CM

## 2022-04-03 PROCEDURE — 99282 EMERGENCY DEPT VISIT SF MDM: CPT

## 2022-04-03 PROCEDURE — 70450 CT HEAD/BRAIN W/O DYE: CPT

## 2022-04-03 NOTE — ED PROVIDER NOTES
Time: 5:09 PM EDT  Arrived by: private car; accompanied by family   Chief Complaint: FALL   History provided by: pt  History is limited by: N/A     History of Present Illness:  Patient is a 76 y.o. female that presents to the emergency department with mechanical FALL that occurred today. Pt states that she missed a step and fell, landing on a table made by 2 x4s. She has injury to the face with moderate wound and associated pain. No neck pain, back pain, or LOC. She reports no other injuries. No modifying factors.     No recent fever, cough, or dysuria. Pt denies dizziness and CP prior to the fall.     History provided by:  Patient    Recently seen: Pt was seen at  today and was referred to the ED for further evaluation.     Patient Care Team  Primary Care Provider: Dillon Gardner MD    Past Medical History:     Allergies   Allergen Reactions   • Morphine Shortness Of Breath     Past Medical History:   Diagnosis Date   • Acute pancreatitis    • Adenomatous polyp of stomach    • Allergy    • Alopecia    • C. difficile colitis    • Chronic lower back pain    • Depression    • Diabetes mellitus (HCC)    • Diabetes type 2, uncontrolled    • Difficulty breathing     At rest   • Edema    • Fatigue    • Heartburn    • Hiatal hernia    • History of colonoscopy     Polyp   • Hypercholesterolemia    • Insomnia    • Knee joint pain     Bilateral   • Loss of hair    • Morbid obesity (HCC)    • Numbness    • Polyphagia(783.6)    • Rash     skin folds   • Regurgitation    • Sinus drainage    • Skin cancer    • Skin rash     Beneath both breasts   • Sleep apnea    • Snoring    • Stress incontinence     Temporary urinary loss of control with cough and sneeze   • Tingling    • Wears dentures    • Wears glasses      Past Surgical History:   Procedure Laterality Date   • BLADDER SURGERY      Tack   • COLONOSCOPY N/A 3/8/2022    Procedure: COLONOSCOPY WITH POLYPECTOMY;  Surgeon: Kody Pickard MD;  [facility]: [facility]  ENDOSCOPY;  Service: Gastroenterology;  Laterality: N/A;  COLON POLYPS, DIVERTICULOSIS   • HYSTERECTOMY     • KNEE SURGERY Left    • LAPAROSCOPIC CHOLECYSTECTOMY     • LAPAROSCOPIC GASTRIC BANDING       Family History   Problem Relation Age of Onset   • Hypertension Mother    • Heart attack Father    • Obesity Brother         Morbis   • Colon cancer Brother    • Colon cancer Other        Home Medications:  Prior to Admission medications    Medication Sig Start Date End Date Taking? Authorizing Provider   Accu-Chek Softclix Lancets lancets 1 each by Other route As Needed. 5/26/21   Anup Self MD   aspirin 81 MG tablet Take 81 mg by mouth Daily. LAST DOSE MARCH 1ST 6/20/13   Anup Self MD   Blood Glucose Calibration (Accu-Chek Lyndsey) solution  5/27/21   Anup Self MD   colestipol (COLESTID) 1 g tablet Take 1 g by mouth Every Night.    Anup Self MD   DULoxetine (Cymbalta) 60 MG capsule Take 1 capsule by mouth Daily.  Patient taking differently: Take 60 mg by mouth Every Night. 2/3/22   Dillon Gardner MD   furosemide (LASIX) 40 MG tablet Take 1 tablet by mouth Daily.  Patient taking differently: Take 40 mg by mouth Every Night. 11/16/21   Dillon Gardner MD   gabapentin (NEURONTIN) 800 MG tablet Take 800 mg by mouth 3 (Three) Times a Day.    Anup Self MD   glucose blood (Accu-Chek Lyndsey Plus) test strip TEST BLOOD SUGAR THREE TIMES DAILY 11/15/21   Diloln Gardner MD   meloxicam (MOBIC) 15 MG tablet Take 15 mg by mouth Every Night. 6/20/13   Anup Self MD   metFORMIN (GLUCOPHAGE) 850 MG tablet TAKE 1 TABLET TWICE DAILY  Patient taking differently: Take 850 mg by mouth Every Night. HOLD METFORMIN Monday NIGHT 11/15/21   Dillon Gardner MD   metoprolol succinate XL (TOPROL-XL) 25 MG 24 hr tablet Take 25 mg by mouth Every Night. 6/14/13   Anup Self MD   polyethylene glycol (GoLYTELY) 236 g solution Starting at  "noon on day prior to procedure, drink 8 ounces every 30 minutes until all gone or stools are clear. May add flavor packet. 3/4/22   Nanda Palm APRN   potassium chloride (K-DUR,KLOR-CON) 10 MEQ CR tablet Take 10 mEq by mouth Every Night. 7/13/21   Anup Self MD   rOPINIRole (REQUIP) 2 MG tablet TAKE 1 TABLET THREE TIMES DAILY  Patient taking differently: Take 2 mg by mouth 3 (Three) Times a Day. 11/15/21   Dillon Gardner MD   simvastatin (ZOCOR) 40 MG tablet Take 1 tablet by mouth Every Night. 2/3/22   Dillon Gardner MD   HYDROcodone-acetaminophen (NORCO) 5-325 MG per tablet Take 1 tablet by mouth As Needed.  4/3/22  Anup Self MD        Social History:   Social History     Tobacco Use   • Smoking status: Former Smoker     Types: Cigarettes   • Smokeless tobacco: Never Used   • Tobacco comment: quit 3 months ago   Vaping Use   • Vaping Use: Never used   Substance Use Topics   • Alcohol use: No   • Drug use: No     Recent travel: no     Review of Systems:  Review of Systems   Constitutional: Negative for chills, diaphoresis and fever.   HENT: Negative for ear discharge and nosebleeds.    Eyes: Negative for photophobia.   Respiratory: Negative for shortness of breath.    Cardiovascular: Negative for chest pain.   Gastrointestinal: Negative for diarrhea, nausea and vomiting.   Genitourinary: Negative for dysuria.   Musculoskeletal: Negative for back pain and neck pain.   Skin: Positive for wound (face). Negative for rash.   Neurological: Negative for headaches.        Physical Exam:  /75   Pulse 74   Temp 97.9 °F (36.6 °C) (Oral)   Resp 16   Ht 149.9 cm (59\")   Wt 77.7 kg (171 lb 4.8 oz)   SpO2 95%   BMI 34.60 kg/m²     Physical Exam  Vitals and nursing note reviewed.   Constitutional:       General: She is not in acute distress.     Appearance: Normal appearance.   HENT:      Head: Normocephalic.      Comments: Right lateral and superior periorbital " large abrasion and swelling. 1 cm superficial laceration/skin tear to right lateral periorbital area.      Nose: Nose normal.   Eyes:      General: No scleral icterus.     Extraocular Movements: Extraocular movements intact.      Pupils: Pupils are equal, round, and reactive to light.   Cardiovascular:      Rate and Rhythm: Normal rate and regular rhythm.      Heart sounds: Normal heart sounds.   Pulmonary:      Effort: Pulmonary effort is normal. No respiratory distress.      Breath sounds: Normal breath sounds.   Abdominal:      Palpations: Abdomen is soft.      Tenderness: There is no abdominal tenderness.   Musculoskeletal:         General: Normal range of motion.      Cervical back: Neck supple.      Right lower leg: No edema.      Left lower leg: No edema.   Skin:     General: Skin is warm and dry.   Neurological:      General: No focal deficit present.      Mental Status: She is alert and oriented to person, place, and time.      Sensory: No sensory deficit.      Motor: No weakness.                Medications in the Emergency Department:  Medications - No data to display     Labs  Lab Results (last 24 hours)     ** No results found for the last 24 hours. **           Imaging:  CT Head Without Contrast    Result Date: 4/3/2022  PROCEDURE: CT HEAD WO CONTRAST  COMPARISON:  [facility], CT, HEAD W/O CONTRAST, 5/09/2019, 13:13. INDICATIONS: FALL. LACERATION AND BRUISING LATERAL SIDE OF RIGHT EYE AREA.  PROTOCOL:   Standard imaging protocol performed    RADIATION:   DLP: 1145.2mGy*cm   MA and/or KV was adjusted to minimize radiation dose.     TECHNIQUE: After obtaining the patient's consent, CT images were obtained without non-ionic intravenous contrast material.  FINDINGS:  There is no acute hemorrhage, midline shift, or suspicious extra-axial fluid collections.  The patient has mild volume loss.  There is prominence of the sulci, fissures, and ventricles.  The basal cisterns are well-maintained.   There are areas of decreased density in the white matter tracts felt to represent chronic microvascular ischemic changes.  There are atherosclerotic vascular calcifications in the cavernous carotid arteries and distal vertebral arteries.  The patient has had previous cataract surgery.  The patient has a right frontal scalp contusion.  There is no skull fracture.  The paranasal and mastoid sinuses are clear.        1. Mild volume loss secondary to cerebral atrophy. 2. Chronic ischemic changes. 3. No acute intracranial findings.  4. Small right frontal scalp contusion.     ALEXANDRE GALO MD       Electronically Signed and Approved By: ALEXANDRE GALO MD on 4/03/2022 at 17:27               Procedures:  Laceration Repair    Date/Time: 4/3/2022 6:36 PM  Performed by: Deangelo Grewal MD  Authorized by: Deangelo Grewal MD     Consent:     Consent obtained:  Verbal    Consent given by:  Patient    Risks, benefits, and alternatives were discussed: yes      Risks discussed:  Infection  Universal protocol:     Procedure explained and questions answered to patient or proxy's satisfaction: yes      Patient identity confirmed:  Verbally with patient  Anesthesia:     Anesthesia method:  None  Laceration details:     Location:  Face    Face location:  Forehead (Right superior lateral periorbital)    Length (cm):  1.3    Depth (mm):  2  Exploration:     Imaging outcome: foreign body not noted      Wound exploration: wound explored through full range of motion and entire depth of wound visualized      Contaminated: no    Treatment:     Area cleansed with:  Povidone-iodine    Visualized foreign bodies/material removed: no      Debridement:  None    Undermining:  None  Skin repair:     Repair method:  Tissue adhesive  Approximation:     Approximation:  Close  Post-procedure details:     Dressing:  Antibiotic ointment and adhesive bandage    Procedure completion:  Tolerated well, no immediate complications        Progress                             Medical Decision Making:  MDM  Number of Diagnoses or Management Options  Contusion of face, initial encounter: new and requires workup  Facial laceration, initial encounter: new and requires workup     Amount and/or Complexity of Data Reviewed  Tests in the radiology section of CPT®: reviewed  Independent visualization of images, tracings, or specimens: yes    Risk of Complications, Morbidity, and/or Mortality  Presenting problems: moderate  Management options: low    Patient Progress  Patient progress: stable       Final diagnoses:   Contusion of face, initial encounter   Facial laceration, initial encounter        Disposition:  ED Disposition     ED Disposition   Discharge    Condition   Stable    Comment   --             Documentation assistance provided by Morenita Roque acting as scribe for Deangelo Grewal MD. Information recorded by the scribe was done at my direction and has been verified and validated by me.        Morenita Roque  04/03/22 1743       Deangelo Grewal MD  04/04/22 0002

## 2022-04-22 RX ORDER — BLOOD SUGAR DIAGNOSTIC
STRIP MISCELLANEOUS
Qty: 300 EACH | Refills: 3 | Status: SHIPPED | OUTPATIENT
Start: 2022-04-22

## 2022-04-22 RX ORDER — POTASSIUM CHLORIDE 750 MG/1
TABLET, EXTENDED RELEASE ORAL
Qty: 90 TABLET | Refills: 3 | Status: SHIPPED | OUTPATIENT
Start: 2022-04-22

## 2022-04-22 RX ORDER — METOPROLOL SUCCINATE 25 MG/1
TABLET, EXTENDED RELEASE ORAL
Qty: 180 TABLET | Refills: 3 | Status: SHIPPED | OUTPATIENT
Start: 2022-04-22

## 2022-04-22 RX ORDER — DULOXETIN HYDROCHLORIDE 60 MG/1
60 CAPSULE, DELAYED RELEASE ORAL NIGHTLY
Qty: 90 CAPSULE | Refills: 3 | Status: SHIPPED | OUTPATIENT
Start: 2022-04-22

## 2022-04-22 RX ORDER — SIMVASTATIN 40 MG
TABLET ORAL
Qty: 90 TABLET | Refills: 3 | Status: SHIPPED | OUTPATIENT
Start: 2022-04-22

## 2022-04-22 RX ORDER — LANCETS
EACH MISCELLANEOUS
Qty: 300 EACH | Refills: 3 | Status: SHIPPED | OUTPATIENT
Start: 2022-04-22

## 2022-05-09 RX ORDER — FUROSEMIDE 40 MG/1
TABLET ORAL
Qty: 90 TABLET | Refills: 1 | Status: SHIPPED | OUTPATIENT
Start: 2022-05-09

## 2022-05-20 ENCOUNTER — TELEPHONE (OUTPATIENT)
Dept: INTERNAL MEDICINE | Facility: CLINIC | Age: 77
End: 2022-05-20

## 2022-05-20 NOTE — TELEPHONE ENCOUNTER
Caller: [email or web address]    Relationship: Other    Best call back number: [phone]    What test was performed: PERIPHERAL ARTERY DISEASE TEST    When was the test performed: 5/17/2022    Where was the test performed: AT PATIENTS HOME    Additional notes:     TEST CAME BACK ABNORMAL. IT WAS SEVERE ON THE RIGHT LOWER EXTREMITY.     OFFICE JUST CALLING TO ALERT PCP AND WILL BE MAILING REPORT FOR THE DR WICK.

## 2022-05-25 ENCOUNTER — LAB (OUTPATIENT)
Dept: LAB | Facility: HOSPITAL | Age: 77
End: 2022-05-25

## 2022-05-25 DIAGNOSIS — M51.36 DEGENERATION OF LUMBAR INTERVERTEBRAL DISC: ICD-10-CM

## 2022-05-25 DIAGNOSIS — M25.559 HIP PAIN: ICD-10-CM

## 2022-05-25 DIAGNOSIS — M48.062 SPINAL STENOSIS OF LUMBAR REGION WITH NEUROGENIC CLAUDICATION: ICD-10-CM

## 2022-05-25 DIAGNOSIS — E78.2 MIXED HYPERLIPIDEMIA: ICD-10-CM

## 2022-05-25 DIAGNOSIS — G25.81 RESTLESS LEG SYNDROME: ICD-10-CM

## 2022-05-25 DIAGNOSIS — M25.50 MULTIPLE JOINT PAIN: ICD-10-CM

## 2022-05-25 DIAGNOSIS — E66.9 OBESITY (BMI 30-39.9): ICD-10-CM

## 2022-05-25 DIAGNOSIS — J44.9 CHRONIC OBSTRUCTIVE PULMONARY DISEASE, UNSPECIFIED COPD TYPE: ICD-10-CM

## 2022-05-25 DIAGNOSIS — F32.A DEPRESSION, UNSPECIFIED DEPRESSION TYPE: ICD-10-CM

## 2022-05-25 DIAGNOSIS — E55.9 VITAMIN D DEFICIENCY: ICD-10-CM

## 2022-05-25 DIAGNOSIS — E11.40 TYPE 2 DIABETES MELLITUS WITH DIABETIC NEUROPATHY, WITHOUT LONG-TERM CURRENT USE OF INSULIN: ICD-10-CM

## 2022-05-25 DIAGNOSIS — I50.32 CHRONIC DIASTOLIC CONGESTIVE HEART FAILURE: ICD-10-CM

## 2022-05-25 LAB
ALBUMIN SERPL-MCNC: 4 G/DL (ref 3.5–5.2)
ALBUMIN/GLOB SERPL: 1.3 G/DL
ALP SERPL-CCNC: 62 U/L (ref 39–117)
ALT SERPL W P-5'-P-CCNC: 11 U/L (ref 1–33)
ANION GAP SERPL CALCULATED.3IONS-SCNC: 12.4 MMOL/L (ref 5–15)
AST SERPL-CCNC: 27 U/L (ref 1–32)
BASOPHILS # BLD AUTO: 0.02 10*3/MM3 (ref 0–0.2)
BASOPHILS NFR BLD AUTO: 0.3 % (ref 0–1.5)
BILIRUB SERPL-MCNC: 0.4 MG/DL (ref 0–1.2)
BUN SERPL-MCNC: 10 MG/DL (ref 8–23)
BUN/CREAT SERPL: 10.1 (ref 7–25)
CALCIUM SPEC-SCNC: 9.6 MG/DL (ref 8.6–10.5)
CHLORIDE SERPL-SCNC: 103 MMOL/L (ref 98–107)
CHOLEST SERPL-MCNC: 110 MG/DL (ref 0–200)
CO2 SERPL-SCNC: 26.6 MMOL/L (ref 22–29)
CREAT SERPL-MCNC: 0.99 MG/DL (ref 0.57–1)
DEPRECATED RDW RBC AUTO: 44.8 FL (ref 37–54)
EGFRCR SERPLBLD CKD-EPI 2021: 58.5 ML/MIN/1.73
EOSINOPHIL # BLD AUTO: 0.05 10*3/MM3 (ref 0–0.4)
EOSINOPHIL NFR BLD AUTO: 0.8 % (ref 0.3–6.2)
ERYTHROCYTE [DISTWIDTH] IN BLOOD BY AUTOMATED COUNT: 14.1 % (ref 12.3–15.4)
GLOBULIN UR ELPH-MCNC: 3.1 GM/DL
GLUCOSE SERPL-MCNC: 102 MG/DL (ref 65–99)
HBA1C MFR BLD: 6.2 % (ref 4.8–5.6)
HCT VFR BLD AUTO: 41.3 % (ref 34–46.6)
HDLC SERPL-MCNC: 32 MG/DL (ref 40–60)
HGB BLD-MCNC: 14 G/DL (ref 12–15.9)
IMM GRANULOCYTES # BLD AUTO: 0.03 10*3/MM3 (ref 0–0.05)
IMM GRANULOCYTES NFR BLD AUTO: 0.5 % (ref 0–0.5)
LDLC SERPL CALC-MCNC: 56 MG/DL (ref 0–100)
LDLC/HDLC SERPL: 1.68 {RATIO}
LYMPHOCYTES # BLD AUTO: 1.62 10*3/MM3 (ref 0.7–3.1)
LYMPHOCYTES NFR BLD AUTO: 26.6 % (ref 19.6–45.3)
MCH RBC QN AUTO: 29.8 PG (ref 26.6–33)
MCHC RBC AUTO-ENTMCNC: 33.9 G/DL (ref 31.5–35.7)
MCV RBC AUTO: 87.9 FL (ref 79–97)
MONOCYTES # BLD AUTO: 0.54 10*3/MM3 (ref 0.1–0.9)
MONOCYTES NFR BLD AUTO: 8.9 % (ref 5–12)
NEUTROPHILS NFR BLD AUTO: 3.84 10*3/MM3 (ref 1.7–7)
NEUTROPHILS NFR BLD AUTO: 62.9 % (ref 42.7–76)
NRBC BLD AUTO-RTO: 0 /100 WBC (ref 0–0.2)
NT-PROBNP SERPL-MCNC: 90.1 PG/ML (ref 0–1800)
PLATELET # BLD AUTO: 231 10*3/MM3 (ref 140–450)
PMV BLD AUTO: 9.8 FL (ref 6–12)
POTASSIUM SERPL-SCNC: 4.3 MMOL/L (ref 3.5–5.2)
PROT SERPL-MCNC: 7.1 G/DL (ref 6–8.5)
RBC # BLD AUTO: 4.7 10*6/MM3 (ref 3.77–5.28)
SODIUM SERPL-SCNC: 142 MMOL/L (ref 136–145)
T4 FREE SERPL-MCNC: 1.08 NG/DL (ref 0.93–1.7)
TRIGL SERPL-MCNC: 121 MG/DL (ref 0–150)
TSH SERPL DL<=0.05 MIU/L-ACNC: 2.03 UIU/ML (ref 0.27–4.2)
VLDLC SERPL-MCNC: 22 MG/DL (ref 5–40)
WBC NRBC COR # BLD: 6.1 10*3/MM3 (ref 3.4–10.8)

## 2022-05-25 PROCEDURE — 80061 LIPID PANEL: CPT

## 2022-05-25 PROCEDURE — 82306 VITAMIN D 25 HYDROXY: CPT

## 2022-05-25 PROCEDURE — 82746 ASSAY OF FOLIC ACID SERUM: CPT

## 2022-05-25 PROCEDURE — 85025 COMPLETE CBC W/AUTO DIFF WBC: CPT

## 2022-05-25 PROCEDURE — 83036 HEMOGLOBIN GLYCOSYLATED A1C: CPT

## 2022-05-25 PROCEDURE — 83880 ASSAY OF NATRIURETIC PEPTIDE: CPT

## 2022-05-25 PROCEDURE — 80053 COMPREHEN METABOLIC PANEL: CPT

## 2022-05-25 PROCEDURE — 36415 COLL VENOUS BLD VENIPUNCTURE: CPT

## 2022-05-25 PROCEDURE — 84439 ASSAY OF FREE THYROXINE: CPT

## 2022-05-25 PROCEDURE — 84443 ASSAY THYROID STIM HORMONE: CPT

## 2022-05-26 ENCOUNTER — OFFICE VISIT (OUTPATIENT)
Dept: INTERNAL MEDICINE | Facility: CLINIC | Age: 77
End: 2022-05-26

## 2022-05-26 VITALS
OXYGEN SATURATION: 93 % | DIASTOLIC BLOOD PRESSURE: 79 MMHG | BODY MASS INDEX: 33.63 KG/M2 | SYSTOLIC BLOOD PRESSURE: 128 MMHG | HEART RATE: 81 BPM | TEMPERATURE: 97.9 F | HEIGHT: 59 IN | WEIGHT: 166.8 LBS

## 2022-05-26 DIAGNOSIS — I10 ESSENTIAL HYPERTENSION: ICD-10-CM

## 2022-05-26 DIAGNOSIS — E78.2 MIXED HYPERLIPIDEMIA: ICD-10-CM

## 2022-05-26 DIAGNOSIS — G25.81 RESTLESS LEG SYNDROME: Primary | ICD-10-CM

## 2022-05-26 DIAGNOSIS — M54.50 CHRONIC BILATERAL LOW BACK PAIN WITHOUT SCIATICA: ICD-10-CM

## 2022-05-26 DIAGNOSIS — E66.9 OBESITY (BMI 30-39.9): ICD-10-CM

## 2022-05-26 DIAGNOSIS — E11.40 TYPE 2 DIABETES MELLITUS WITH DIABETIC NEUROPATHY, WITHOUT LONG-TERM CURRENT USE OF INSULIN: ICD-10-CM

## 2022-05-26 DIAGNOSIS — M79.89 LIMB SWELLING: ICD-10-CM

## 2022-05-26 DIAGNOSIS — G47.33 OBSTRUCTIVE APNEA: ICD-10-CM

## 2022-05-26 DIAGNOSIS — E11.9 DIABETES MELLITUS WITH HEMOGLOBIN A1C GOAL OF 7.0%-8.0%: ICD-10-CM

## 2022-05-26 DIAGNOSIS — F32.A DEPRESSION, UNSPECIFIED DEPRESSION TYPE: ICD-10-CM

## 2022-05-26 DIAGNOSIS — M48.062 SPINAL STENOSIS OF LUMBAR REGION WITH NEUROGENIC CLAUDICATION: ICD-10-CM

## 2022-05-26 DIAGNOSIS — G89.29 CHRONIC BILATERAL LOW BACK PAIN WITHOUT SCIATICA: ICD-10-CM

## 2022-05-26 DIAGNOSIS — M47.816 LUMBAR SPONDYLOSIS: ICD-10-CM

## 2022-05-26 DIAGNOSIS — E55.9 VITAMIN D DEFICIENCY: ICD-10-CM

## 2022-05-26 LAB
25(OH)D3 SERPL-MCNC: 30.6 NG/ML (ref 30–100)
FOLATE SERPL-MCNC: 17.6 NG/ML (ref 4.78–24.2)

## 2022-05-26 PROCEDURE — 99214 OFFICE O/P EST MOD 30 MIN: CPT | Performed by: INTERNAL MEDICINE

## 2022-05-26 RX ORDER — METFORMIN HYDROCHLORIDE 750 MG/1
750 TABLET, EXTENDED RELEASE ORAL
Qty: 90 TABLET | Refills: 3 | Status: SHIPPED | OUTPATIENT
Start: 2022-05-26 | End: 2022-09-28 | Stop reason: SDUPTHER

## 2022-05-26 RX ORDER — GABAPENTIN 800 MG/1
800 TABLET ORAL 2 TIMES DAILY
Qty: 60 TABLET | Refills: 3 | Status: SHIPPED | OUTPATIENT
Start: 2022-05-26 | End: 2022-10-13 | Stop reason: SDUPTHER

## 2022-05-26 NOTE — PROGRESS NOTES
"Chief Complaint/ HPI: f/u with dm and RLS , AND CHRONIC BACK PAIN   FEET SWELL    Complains of an area on her backside bottom area that hurts especially if she tries to lay down,    Here to follow-up with arterial evaluation also that was done March 15, 2022        Objective   Vital Signs  Vitals:    05/26/22 1358   BP: 128/79   Pulse: 81   Temp: 97.9 °F (36.6 °C)   SpO2: 93%   Weight: 75.7 kg (166 lb 12.8 oz)   Height: 149.9 cm (59.02\")      Body mass index is 33.67 kg/m².  Review of Systems   Physical Exam  Constitutional:       General: She is not in acute distress.     Appearance: Normal appearance. She is obese.   HENT:      Head: Normocephalic.      Mouth/Throat:      Mouth: Mucous membranes are moist.   Eyes:      Conjunctiva/sclera: Conjunctivae normal.      Pupils: Pupils are equal, round, and reactive to light.   Cardiovascular:      Rate and Rhythm: Normal rate and regular rhythm.      Pulses: Normal pulses.      Heart sounds: Normal heart sounds.   Pulmonary:      Effort: Pulmonary effort is normal.      Breath sounds: Normal breath sounds.   Abdominal:      General: Bowel sounds are normal.      Palpations: Abdomen is soft.   Musculoskeletal:         General: No swelling. Normal range of motion.      Cervical back: Neck supple.      Right lower leg: Edema present.      Left lower leg: Edema present.   Skin:     General: Skin is warm and dry.      Coloration: Skin is not jaundiced.   Neurological:      General: No focal deficit present.      Mental Status: She is alert and oriented to person, place, and time. Mental status is at baseline.   Psychiatric:         Mood and Affect: Mood normal.         Behavior: Behavior normal.         Thought Content: Thought content normal.         Judgment: Judgment normal.        Result Review :   Lab Results   Component Value Date    PROBNP 90.1 05/25/2022    PROBNP 44.6 10/11/2021    BNP 80 05/20/2019     05/16/2019    BNP 4275 (H) 05/12/2019     CMP    CMP " 10/11/21 1/24/22 5/25/22   Glucose 63 (A) 79 102 (A)   BUN 14 11 10   Creatinine 0.77 0.86 0.99   eGFR Non African Am 72 64    Sodium 141 141 142   Potassium 4.5 4.2 4.3   Chloride 100 103 103   Calcium 9.2 9.4 9.6   Albumin 4.60 4.30 4.00   Total Bilirubin 0.4 0.2 0.4   Alkaline Phosphatase 71 70 62   AST (SGOT) 32 36 (A) 27   ALT (SGPT) 15 14 11   (A) Abnormal value            CBC w/diff    CBC w/Diff 10/11/21 5/25/22   WBC 5.74 6.10   RBC 4.88 4.70   Hemoglobin 14.2 14.0   Hematocrit 43.5 41.3   MCV 89.1 87.9   MCH 29.1 29.8   MCHC 32.6 33.9   RDW 14.3 14.1   Platelets 231 231   Neutrophil Rel % 50.2 62.9   Immature Granulocyte Rel % 0.3 0.5   Lymphocyte Rel % 38.5 26.6   Monocyte Rel % 8.4 8.9   Eosinophil Rel % 2.4 0.8   Basophil Rel % 0.2 0.3            Lipid Panel    Lipid Panel 10/11/21 1/24/22 5/25/22   Total Cholesterol 162 123 110   Triglycerides 164 (A) 173 (A) 121   HDL Cholesterol 32 (A) 34 (A) 32 (A)   VLDL Cholesterol 29 29 22   LDL Cholesterol  101 (A) 60 56   LDL/HDL Ratio 3.04 1.60 1.68   (A) Abnormal value             Lab Results   Component Value Date    TSH 2.030 05/25/2022    TSH 1.650 10/11/2021    TSH 1.490 05/10/2019      Lab Results   Component Value Date    FREET4 1.08 05/25/2022    FREET4 1.4 05/10/2019      A1C Last 3 Results    HGBA1C Last 3 Results 1/24/22 5/25/22   Hemoglobin A1C 6.85 (A) 6.20 (A)   (A) Abnormal value                              Visit Diagnoses:    ICD-10-CM ICD-9-CM   1. Restless leg syndrome  G25.81 333.94   2. Spinal stenosis of lumbar region with neurogenic claudication  M48.062 724.03   3. Limb swelling  M79.89 729.81   4. Diabetes mellitus with hemoglobin A1c goal of 7.0%-8.0% ([facility])  E11.9 250.00   5. Mixed hyperlipidemia  E78.2 272.2   6. Depression, unspecified depression type  F32.A 311   7. Obesity (BMI 30-39.9)  E66.9 278.00   8. Type 2 diabetes mellitus with diabetic neuropathy, without long-term current use of insulin ([facility])  E11.40 250.60     357.2   9.  Essential hypertension  I10 401.9   10. Vitamin D deficiency  E55.9 268.9   11. Lumbar spondylosis  M47.816 721.3   12. Obstructive apnea  G47.33 327.23   13. Chronic bilateral low back pain without sciatica  M54.50 724.2    G89.29 338.29       Assessment and Plan   Diagnoses and all orders for this visit:    1. Restless leg syndrome (Primary)  -     metFORMIN ER (GLUCOPHAGE-XR) 750 MG 24 hr tablet; Take 1 tablet by mouth Daily With Breakfast.  Dispense: 90 tablet; Refill: 3  -     gabapentin (NEURONTIN) 800 MG tablet; Take 1 tablet by mouth 2 (Two) Times a Day.  Dispense: 60 tablet; Refill: 3  -     Magnesium; Future  -     Hemoglobin A1c; Future  -     Comprehensive Metabolic Panel; Future  -     CBC & Differential; Future    2. Spinal stenosis of lumbar region with neurogenic claudication  -     metFORMIN ER (GLUCOPHAGE-XR) 750 MG 24 hr tablet; Take 1 tablet by mouth Daily With Breakfast.  Dispense: 90 tablet; Refill: 3  -     gabapentin (NEURONTIN) 800 MG tablet; Take 1 tablet by mouth 2 (Two) Times a Day.  Dispense: 60 tablet; Refill: 3  -     Magnesium; Future  -     Hemoglobin A1c; Future  -     Comprehensive Metabolic Panel; Future  -     CBC & Differential; Future    3. Limb swelling  -     metFORMIN ER (GLUCOPHAGE-XR) 750 MG 24 hr tablet; Take 1 tablet by mouth Daily With Breakfast.  Dispense: 90 tablet; Refill: 3  -     gabapentin (NEURONTIN) 800 MG tablet; Take 1 tablet by mouth 2 (Two) Times a Day.  Dispense: 60 tablet; Refill: 3  -     Magnesium; Future  -     Hemoglobin A1c; Future  -     Comprehensive Metabolic Panel; Future  -     CBC & Differential; Future    4. Diabetes mellitus with hemoglobin A1c goal of 7.0%-8.0% ([facility])  -     metFORMIN ER (GLUCOPHAGE-XR) 750 MG 24 hr tablet; Take 1 tablet by mouth Daily With Breakfast.  Dispense: 90 tablet; Refill: 3  -     gabapentin (NEURONTIN) 800 MG tablet; Take 1 tablet by mouth 2 (Two) Times a Day.  Dispense: 60 tablet; Refill: 3  -     Magnesium;  Future  -     Hemoglobin A1c; Future  -     Comprehensive Metabolic Panel; Future  -     CBC & Differential; Future    5. Mixed hyperlipidemia  -     metFORMIN ER (GLUCOPHAGE-XR) 750 MG 24 hr tablet; Take 1 tablet by mouth Daily With Breakfast.  Dispense: 90 tablet; Refill: 3  -     gabapentin (NEURONTIN) 800 MG tablet; Take 1 tablet by mouth 2 (Two) Times a Day.  Dispense: 60 tablet; Refill: 3  -     Magnesium; Future  -     Hemoglobin A1c; Future  -     Comprehensive Metabolic Panel; Future  -     CBC & Differential; Future    6. Depression, unspecified depression type  -     metFORMIN ER (GLUCOPHAGE-XR) 750 MG 24 hr tablet; Take 1 tablet by mouth Daily With Breakfast.  Dispense: 90 tablet; Refill: 3  -     gabapentin (NEURONTIN) 800 MG tablet; Take 1 tablet by mouth 2 (Two) Times a Day.  Dispense: 60 tablet; Refill: 3  -     Magnesium; Future  -     Hemoglobin A1c; Future  -     Comprehensive Metabolic Panel; Future  -     CBC & Differential; Future    7. Obesity (BMI 30-39.9)  -     metFORMIN ER (GLUCOPHAGE-XR) 750 MG 24 hr tablet; Take 1 tablet by mouth Daily With Breakfast.  Dispense: 90 tablet; Refill: 3  -     gabapentin (NEURONTIN) 800 MG tablet; Take 1 tablet by mouth 2 (Two) Times a Day.  Dispense: 60 tablet; Refill: 3  -     Magnesium; Future  -     Hemoglobin A1c; Future  -     Comprehensive Metabolic Panel; Future  -     CBC & Differential; Future    8. Type 2 diabetes mellitus with diabetic neuropathy, without long-term current use of insulin (HCC)  -     metFORMIN ER (GLUCOPHAGE-XR) 750 MG 24 hr tablet; Take 1 tablet by mouth Daily With Breakfast.  Dispense: 90 tablet; Refill: 3  -     gabapentin (NEURONTIN) 800 MG tablet; Take 1 tablet by mouth 2 (Two) Times a Day.  Dispense: 60 tablet; Refill: 3  -     Magnesium; Future  -     Hemoglobin A1c; Future  -     Comprehensive Metabolic Panel; Future  -     CBC & Differential; Future    9. Essential hypertension  -     metFORMIN ER (GLUCOPHAGE-XR) 750 MG  24 hr tablet; Take 1 tablet by mouth Daily With Breakfast.  Dispense: 90 tablet; Refill: 3  -     gabapentin (NEURONTIN) 800 MG tablet; Take 1 tablet by mouth 2 (Two) Times a Day.  Dispense: 60 tablet; Refill: 3  -     Magnesium; Future  -     Hemoglobin A1c; Future  -     Comprehensive Metabolic Panel; Future  -     CBC & Differential; Future    10. Vitamin D deficiency  -     metFORMIN ER (GLUCOPHAGE-XR) 750 MG 24 hr tablet; Take 1 tablet by mouth Daily With Breakfast.  Dispense: 90 tablet; Refill: 3  -     gabapentin (NEURONTIN) 800 MG tablet; Take 1 tablet by mouth 2 (Two) Times a Day.  Dispense: 60 tablet; Refill: 3  -     Magnesium; Future  -     Hemoglobin A1c; Future  -     Comprehensive Metabolic Panel; Future  -     CBC & Differential; Future    11. Lumbar spondylosis  -     metFORMIN ER (GLUCOPHAGE-XR) 750 MG 24 hr tablet; Take 1 tablet by mouth Daily With Breakfast.  Dispense: 90 tablet; Refill: 3  -     gabapentin (NEURONTIN) 800 MG tablet; Take 1 tablet by mouth 2 (Two) Times a Day.  Dispense: 60 tablet; Refill: 3  -     Magnesium; Future  -     Hemoglobin A1c; Future  -     Comprehensive Metabolic Panel; Future  -     CBC & Differential; Future    12. Obstructive apnea  -     metFORMIN ER (GLUCOPHAGE-XR) 750 MG 24 hr tablet; Take 1 tablet by mouth Daily With Breakfast.  Dispense: 90 tablet; Refill: 3  -     gabapentin (NEURONTIN) 800 MG tablet; Take 1 tablet by mouth 2 (Two) Times a Day.  Dispense: 60 tablet; Refill: 3  -     Magnesium; Future  -     Hemoglobin A1c; Future  -     Comprehensive Metabolic Panel; Future  -     CBC & Differential; Future    13. Chronic bilateral low back pain without sciatica  -     metFORMIN ER (GLUCOPHAGE-XR) 750 MG 24 hr tablet; Take 1 tablet by mouth Daily With Breakfast.  Dispense: 90 tablet; Refill: 3  -     gabapentin (NEURONTIN) 800 MG tablet; Take 1 tablet by mouth 2 (Two) Times a Day.  Dispense: 60 tablet; Refill: 3  -     Magnesium; Future  -     Hemoglobin  A1c; Future  -     Comprehensive Metabolic Panel; Future  -     CBC & Differential; Future        Type 2 diabetes hemoglobin A1c 6.2, improved May 2022,--continues metformin 850 MG QD     Frequent loose stools, incontinence at times, discussed changing Metformin to extended release, continues on colestipol since she has had her gallbladder removed,    Peripheral arterial disease left foot pain arterial evaluation March 15, 2022 shows EDEL left and right to be normal,---    Lumbar spinal stenosis previous work-up with [facility] [place] December 2021, was referred to pain management for trigger point injections    Previous left total knee arthroplasty, laparoscopic cholecystectomy March 2016, previous colonoscopies polypectomies, right breast biopsy, right hip surgery, lap band surgery, ADRIANO, recent back surgery July 2018,    Chronic pain, continue Cymbalta,    Depression anxiety, continues on Cymbalta 60 mg daily    Peripheral neuropathy lower legs,---CONT GABAPENTIN 600 MG TID     Previous pelvic fractures    Diverticulosis and diverticulitis in September 2015    C. difficile colitis diagnosed February 2016 treated resolved    History of small bowel enteritis small bowel perforation resolved 2016    Kidney stones    Alcohol use in the past,    Previous ERCP Common bile duct stone cholelithiasis choledocholithiasis with stent placement and sphincterotomy February 29, 2016, status post laparoscopic cholecystectomy Dr. Huizar    History of urinary tract infection    Paroxysmal atrial fibrillation May 2019 with RVR, clinically in sinus rhythm    Hypertension, continues metoprolol XL 25 mg daily,    Obstructive sleep apnea    Chronic lower extremity edema, continues on Lasix 40 mg daily, potassium supplements,, BR NP levels are extremely low at 90 May 25, 2022    Elevated cholesterol continues on simvastatin 40 mg daily                Follow Up   Return in about 6 months (around 11/26/2022).  Patient was given  instructions and counseling regarding her condition or for health maintenance advice. Please see specific information pulled into the AVS if appropriate.

## 2022-07-15 ENCOUNTER — TELEPHONE (OUTPATIENT)
Dept: INTERNAL MEDICINE | Facility: CLINIC | Age: 77
End: 2022-07-15

## 2022-07-15 DIAGNOSIS — G25.81 RESTLESS LEG SYNDROME: Chronic | ICD-10-CM

## 2022-07-15 NOTE — TELEPHONE ENCOUNTER
Caller: Ivania Mattson Nunu    Relationship: Self    Best call back number: [phone]    What medications are you currently taking:   Current Outpatient Medications on File Prior to Visit   Medication Sig Dispense Refill   • Accu-Chek Lyndsey Plus test strip TEST BLOOD SUGAR THREE TIMES DAILY 300 each 3   • Accu-Chek Softclix Lancets lancets TEST BLOOD SUGAR THREE TIMES DAILY 300 each 3   • aspirin 81 MG tablet Take 81 mg by mouth Daily. LAST DOSE MARCH 1ST     • Blood Glucose Calibration (Accu-Chek Lyndsey) solution      • colestipol (COLESTID) 1 g tablet Take 1 g by mouth Every Night.     • DULoxetine (CYMBALTA) 60 MG capsule Take 1 capsule by mouth Every Night. 90 capsule 3   • furosemide (LASIX) 40 MG tablet TAKE 1 TABLET EVERY DAY 90 tablet 1   • gabapentin (NEURONTIN) 800 MG tablet Take 1 tablet by mouth 2 (Two) Times a Day. 60 tablet 3   • meloxicam (MOBIC) 15 MG tablet Take 15 mg by mouth Every Night.     • metFORMIN (GLUCOPHAGE) 850 MG tablet TAKE 1 TABLET TWICE DAILY (Patient taking differently: Take 850 mg by mouth Every Night. HOLD METFORMIN Monday NIGHT) 180 tablet 1   • metFORMIN ER (GLUCOPHAGE-XR) 750 MG 24 hr tablet Take 1 tablet by mouth Daily With Breakfast. 90 tablet 3   • metoprolol succinate XL (TOPROL-XL) 25 MG 24 hr tablet TAKE 1 TABLET TWICE DAILY 180 tablet 3   • polyethylene glycol (GoLYTELY) 236 g solution Starting at noon on day prior to procedure, drink 8 ounces every 30 minutes until all gone or stools are clear. May add flavor packet. 4000 mL 0   • potassium chloride (K-DUR,KLOR-CON) 10 MEQ CR tablet TAKE 1 TABLET EVERY DAY 90 tablet 3   • rOPINIRole (REQUIP) 2 MG tablet TAKE 1 TABLET THREE TIMES DAILY (Patient taking differently: Take 2 mg by mouth 3 (Three) Times a Day.) 270 tablet 1   • simvastatin (ZOCOR) 40 MG tablet TAKE 1 TABLET ONE TIME DAILY IN THE EVENING 90 tablet 3     No current facility-administered medications on file prior to visit.          When did you start taking  these medications: 15 YEARS    Which medication are you concerned about: ROPINIROLE    Who prescribed you this medication: DR CISSE    What are your concerns: PATIENT STATES THE MEDICATION ISN'T LAST LONG ENOUGH BETWEEN DOSES AND WOULD LIKE A DOSAGE INCREASE.    How long have you had these concerns: A COUPLE MONTHS    PHARMACY:  [facility] [address] - [phone]  - [phone] FX     ADDITIONAL INFO:  PATIENT STATES TO PLEASE CALL IF AND WHEN THE INCREASED DOSAGE IS SENT IN.

## 2022-07-18 RX ORDER — ROPINIROLE 2 MG/1
2 TABLET, FILM COATED ORAL 3 TIMES DAILY
Qty: 270 TABLET | Refills: 1 | Status: SHIPPED | OUTPATIENT
Start: 2022-07-18 | End: 2022-09-28 | Stop reason: SDUPTHER

## 2022-07-19 ENCOUNTER — TELEPHONE (OUTPATIENT)
Dept: INTERNAL MEDICINE | Facility: CLINIC | Age: 77
End: 2022-07-19

## 2022-07-19 NOTE — TELEPHONE ENCOUNTER
Caller: Ivania Mattson    Relationship: Self    Best call back number: [phone]    What medication are you requesting: MEDICATION FOR RESTLESS LEGS SYNDROME    If a prescription is needed, what is your preferred pharmacy and phone number: [facility] [address] [facility] [address] - [phone]  - [phone] FX     Additional notes:    PATIENT STATES SHE HAS DOUBLED HER DOSAGE OF ROPINIROLE TO TWO TABLETS THREE TIMES PER DAY AND THEY STILL AREN'T LASTING LONG ENOUGH. PATIENT STATES SHE WOULD LIKE THE STRONGEST DOSAGE OR ANOTHER MEDICATION.

## 2022-07-19 NOTE — TELEPHONE ENCOUNTER
If she wants us to make the referral and to do the work she will need to make an appointment to see me in the office to discuss and we may want to get an echocardiogram another EKG chest x-ray and repeat labs,

## 2022-07-19 NOTE — TELEPHONE ENCOUNTER
Caller: Ivania Mattson    Relationship: Self    Best call back number: [phone]    What is the medical concern/diagnosis: CHEST PAINS    What specialty or service is being requested: CARDIOLOGIST    What is the office location: Nursery    Any additional details: PATIENT STATES SHE SAW DR JACOBSEN PREVIOUSLY AND HE DIDN'T FIND ANYTHING WRONG, BUT SHE IS STILL HAVING THESE CHEST PAINS AND WOULD LIKE A SECOND OPINION.

## 2022-07-20 ENCOUNTER — OFFICE VISIT (OUTPATIENT)
Dept: INTERNAL MEDICINE | Facility: CLINIC | Age: 77
End: 2022-07-20

## 2022-07-20 ENCOUNTER — TELEPHONE (OUTPATIENT)
Dept: INTERNAL MEDICINE | Facility: CLINIC | Age: 77
End: 2022-07-20

## 2022-07-20 VITALS
WEIGHT: 158.2 LBS | TEMPERATURE: 98.4 F | HEART RATE: 73 BPM | DIASTOLIC BLOOD PRESSURE: 74 MMHG | SYSTOLIC BLOOD PRESSURE: 118 MMHG | OXYGEN SATURATION: 95 % | HEIGHT: 59 IN | BODY MASS INDEX: 31.89 KG/M2

## 2022-07-20 DIAGNOSIS — I20.8 OTHER FORMS OF ANGINA PECTORIS: ICD-10-CM

## 2022-07-20 DIAGNOSIS — E78.2 MIXED HYPERLIPIDEMIA: Primary | ICD-10-CM

## 2022-07-20 DIAGNOSIS — I10 ESSENTIAL HYPERTENSION: ICD-10-CM

## 2022-07-20 PROBLEM — I20.89 OTHER FORMS OF ANGINA PECTORIS: Status: ACTIVE | Noted: 2022-07-20

## 2022-07-20 PROCEDURE — 93000 ELECTROCARDIOGRAM COMPLETE: CPT | Performed by: INTERNAL MEDICINE

## 2022-07-20 PROCEDURE — 99214 OFFICE O/P EST MOD 30 MIN: CPT | Performed by: INTERNAL MEDICINE

## 2022-07-20 RX ORDER — ROPINIROLE 3 MG/1
3 TABLET, FILM COATED ORAL 3 TIMES DAILY
Qty: 90 TABLET | Refills: 5 | Status: SHIPPED | OUTPATIENT
Start: 2022-07-20 | End: 2022-10-13 | Stop reason: SDUPTHER

## 2022-07-20 RX ORDER — NITROGLYCERIN 0.4 MG/1
0.4 TABLET SUBLINGUAL
Qty: 30 TABLET | Refills: 12 | Status: SHIPPED | OUTPATIENT
Start: 2022-07-20

## 2022-07-20 NOTE — PROGRESS NOTES
"Chief Complaint/ HPI: Patient with chest pains on Sunday said it lasted 6 to 8 minutes intense pain very severe was not acid reflux according to patient, she is been concerned so we brought her in she called yesterday to the office, she decided not to go to the emergency room over the weekend, she says this happens about once a month or twice a month,          Objective   Vital Signs  Vitals:    07/20/22 0944   BP: 118/74   Pulse: 73   Temp: 98.4 °F (36.9 °C)   SpO2: 95%   Weight: 71.8 kg (158 lb 3.2 oz)   Height: 149.9 cm (59.02\")      Body mass index is 31.94 kg/m².  Review of Systems no associated nausea or vomiting, chest pains as above,  Physical Exam lungs are clear posterior and anterior cardiac exam regular rhythm with 1/6 systolic murmur abdomen soft lower extremities show no edema she has some slightly raised red irritated lesions on the lower legs bilaterally, red skin tears with irritation fresh areas noted on both lower legs with multiple ecchymosis noted to the lower legs, patient is awake alert and oriented x3 using Rollator walker to ambulate,  Result Review :   Lab Results   Component Value Date    PROBNP 90.1 05/25/2022    PROBNP 44.6 10/11/2021    BNP 80 05/20/2019     05/16/2019    BNP 4275 (H) 05/12/2019     CMP    CMP 10/11/21 1/24/22 5/25/22   Glucose 63 (A) 79 102 (A)   BUN 14 11 10   Creatinine 0.77 0.86 0.99   eGFR Non African Am 72 64    Sodium 141 141 142   Potassium 4.5 4.2 4.3   Chloride 100 103 103   Calcium 9.2 9.4 9.6   Albumin 4.60 4.30 4.00   Total Bilirubin 0.4 0.2 0.4   Alkaline Phosphatase 71 70 62   AST (SGOT) 32 36 (A) 27   ALT (SGPT) 15 14 11   (A) Abnormal value            CBC w/diff    CBC w/Diff 10/11/21 5/25/22   WBC 5.74 6.10   RBC 4.88 4.70   Hemoglobin 14.2 14.0   Hematocrit 43.5 41.3   MCV 89.1 87.9   MCH 29.1 29.8   MCHC 32.6 33.9   RDW 14.3 14.1   Platelets 231 231   Neutrophil Rel % 50.2 62.9   Immature Granulocyte Rel % 0.3 0.5   Lymphocyte Rel % 38.5 26.6 "   Monocyte Rel % 8.4 8.9   Eosinophil Rel % 2.4 0.8   Basophil Rel % 0.2 0.3            Lipid Panel    Lipid Panel 10/11/21 1/24/22 5/25/22   Total Cholesterol 162 123 110   Triglycerides 164 (A) 173 (A) 121   HDL Cholesterol 32 (A) 34 (A) 32 (A)   VLDL Cholesterol 29 29 22   LDL Cholesterol  101 (A) 60 56   LDL/HDL Ratio 3.04 1.60 1.68   (A) Abnormal value             Lab Results   Component Value Date    TSH 2.030 05/25/2022    TSH 1.650 10/11/2021    TSH 1.490 05/10/2019      Lab Results   Component Value Date    FREET4 1.08 05/25/2022    FREET4 1.4 05/10/2019      A1C Last 3 Results    HGBA1C Last 3 Results 1/24/22 5/25/22   Hemoglobin A1C 6.85 (A) 6.20 (A)   (A) Abnormal value                       ECG 12 Lead    Date/Time: 7/20/2022 9:56 AM  Performed by: Dillon Gardner MD  Authorized by: Dillon Gardner MD   Comparison: not compared with previous ECG   Previous ECG: no previous ECG available  Rhythm: sinus rhythm  Rate: normal  Q waves: II, III and aVF    ST Elevation: all  QRS axis: normal    Clinical impression: abnormal EKG                Visit Diagnoses:    ICD-10-CM ICD-9-CM   1. Mixed hyperlipidemia  E78.2 272.2   2. Essential hypertension  I10 401.9   3. Other forms of angina pectoris (HCC)  I20.8 413.9       Assessment and Plan   Diagnoses and all orders for this visit:    1. Mixed hyperlipidemia (Primary)    2. Essential hypertension    3. Other forms of angina pectoris (HCC)    Other orders  -     nitroglycerin (Nitrostat) 0.4 MG SL tablet; Place 1 tablet under the tongue Every 5 (Five) Minutes As Needed for Chest Pain. Take no more than 3 doses in 15 minutes.  Dispense: 30 tablet; Refill: 12  -     ECG 12 Lead    Chest pains, coming and going over the past couple months getting worse, intense pains center of the chest, patient needs cardiology evaluation as soon as possible, possible stress test or cardiac cath, patient will continue aspirin therapy,, statin therapy, beta-blockers  metoprolol,--- we will do EKG today, and refer to second cardiology opinion Dr. Walker ---I discussed case with dr romero and will be seen soon , info sent to him July 20, 2022    Type 2 diabetes hemoglobin A1c 6.2, improved May 2022,--continues metformin 850 MG QD     Frequent loose stools, incontinence at times, discussed changing Metformin to extended release, continues on colestipol since she has had her gallbladder removed,    Peripheral arterial disease left foot pain arterial evaluation March 15, 2022 shows EDEL left and right to be normal,---    Lumbar spinal stenosis previous work-up with [facility] [place] December 2021, was referred to pain management for trigger point injections    Previous left total knee arthroplasty, laparoscopic cholecystectomy March 2016, previous colonoscopies polypectomies, right breast biopsy, right hip surgery, lap band surgery, ADRIANO, recent back surgery July 2018,    Chronic pain, continue Cymbalta,    Depression anxiety, continues on Cymbalta 60 mg daily    Peripheral neuropathy lower legs,---CONT GABAPENTIN 600 MG TID     Previous pelvic fractures    Diverticulosis and diverticulitis in September 2015    C. difficile colitis diagnosed February 2016 treated resolved    History of small bowel enteritis small bowel perforation resolved 2016    Kidney stones    Alcohol use in the past,    Previous ERCP Common bile duct stone cholelithiasis choledocholithiasis with stent placement and sphincterotomy February 29, 2016, status post laparoscopic cholecystectomy Dr. Huizar    History of urinary tract infection    Paroxysmal atrial fibrillation May 2019 with RVR, clinically in sinus rhythm    Hypertension, continues metoprolol XL 25 mg daily,    Obstructive sleep apnea, patient needs a new CPAP machine she has been diagnosed with obstructive sleep apnea and needs this machine in order to avoid hypoxic respiratory issues at night, she has significant apnea hypopnea and  require CPAP for treatment, she is compliant with the CPAP device,    Chronic lower extremity edema, continues on Lasix 40 mg daily, potassium supplements,, BR NP levels are extremely low at 90 May 25, 2022    Elevated cholesterol continues on simvastatin 40 mg daily                      Follow Up   No follow-ups on file.  Patient was given instructions and counseling regarding her condition or for health maintenance advice. Please see specific information pulled into the AVS if appropriate.

## 2022-07-20 NOTE — TELEPHONE ENCOUNTER
Caller: Ivania Mattson    Relationship: Self    Best call back number: [phone]     What was the call regarding: PT CALLED REQUESTING HER REQUIP BE CALLED IN WITH AN INCREASE IN THE DOSAGE. SHE STATES THE CURRENT DOSAGE IS NOT WORKING WELL ENOUGH    Do you require a callback: YES

## 2022-07-21 ENCOUNTER — LAB (OUTPATIENT)
Dept: LAB | Facility: HOSPITAL | Age: 77
End: 2022-07-21

## 2022-07-21 DIAGNOSIS — E78.2 MIXED HYPERLIPIDEMIA: ICD-10-CM

## 2022-07-21 DIAGNOSIS — I10 ESSENTIAL HYPERTENSION: ICD-10-CM

## 2022-07-21 DIAGNOSIS — I20.8 OTHER FORMS OF ANGINA PECTORIS: ICD-10-CM

## 2022-07-21 LAB
CK MB SERPL-CCNC: 3.49 NG/ML
CK SERPL-CCNC: 67 U/L (ref 20–180)
TROPONIN T SERPL-MCNC: <0.01 NG/ML (ref 0–0.03)

## 2022-07-21 PROCEDURE — 84484 ASSAY OF TROPONIN QUANT: CPT

## 2022-07-21 PROCEDURE — 82550 ASSAY OF CK (CPK): CPT

## 2022-07-21 PROCEDURE — 36415 COLL VENOUS BLD VENIPUNCTURE: CPT

## 2022-07-21 PROCEDURE — 82553 CREATINE MB FRACTION: CPT

## 2022-07-22 ENCOUNTER — OFFICE VISIT (OUTPATIENT)
Dept: CARDIOLOGY | Facility: CLINIC | Age: 77
End: 2022-07-22

## 2022-07-22 VITALS
HEART RATE: 70 BPM | BODY MASS INDEX: 31.85 KG/M2 | DIASTOLIC BLOOD PRESSURE: 78 MMHG | WEIGHT: 158 LBS | SYSTOLIC BLOOD PRESSURE: 118 MMHG | HEIGHT: 59 IN

## 2022-07-22 DIAGNOSIS — I10 ESSENTIAL HYPERTENSION: ICD-10-CM

## 2022-07-22 DIAGNOSIS — E78.2 MIXED DYSLIPIDEMIA: ICD-10-CM

## 2022-07-22 DIAGNOSIS — R07.89 CHEST PAIN, ATYPICAL: Primary | ICD-10-CM

## 2022-07-22 PROCEDURE — 99214 OFFICE O/P EST MOD 30 MIN: CPT | Performed by: INTERNAL MEDICINE

## 2022-07-22 RX ORDER — ISOSORBIDE MONONITRATE 30 MG/1
30 TABLET, EXTENDED RELEASE ORAL DAILY
Qty: 30 TABLET | Refills: 11 | Status: SHIPPED | OUTPATIENT
Start: 2022-07-22 | End: 2022-10-14 | Stop reason: SDUPTHER

## 2022-07-22 NOTE — PROGRESS NOTES
Chief Complaint  Chest Pain, Congestive Heart Failure, Hyperlipidemia, Hypertension, and Abnormal EKG    Subjective      This is a very pleasant 76-year-old lady returns clinic to follow-up on chest discomfort.  She has been having sporadic episodes of central chest pain radiating to her left arm.  It usually lasts 10 minutes before it spontaneously resolves.  Last episode was last Sunday discharge.  She has no other complaints otherwise.  She has no palpitations, dizziness, shortness of breath, presyncope or syncope.  Previous coronary CTA in 2020 was unremarkable, however, her symptoms have started about a year ago.    Past Medical History:   Diagnosis Date   • Acute pancreatitis    • Adenomatous polyp of stomach    • Allergy    • Alopecia    • C. difficile colitis    • Chronic lower back pain    • Depression    • Diabetes mellitus (HCC)    • Diabetes type 2, uncontrolled    • Difficulty breathing     At rest   • Edema    • Fatigue    • Heartburn    • Hiatal hernia    • History of colonoscopy     Polyp   • Hypercholesterolemia    • Insomnia    • Knee joint pain     Bilateral   • Loss of hair    • Morbid obesity (HCC)    • Numbness    • Polyphagia(783.6)    • Rash     skin folds   • Regurgitation    • Sinus drainage    • Skin cancer    • Skin rash     Beneath both breasts   • Sleep apnea    • Snoring    • Stress incontinence     Temporary urinary loss of control with cough and sneeze   • Tingling    • Wears dentures    • Wears glasses          Current Outpatient Medications:   •  Accu-Chek Lyndsey Plus test strip, TEST BLOOD SUGAR THREE TIMES DAILY, Disp: 300 each, Rfl: 3  •  Accu-Chek Softclix Lancets lancets, TEST BLOOD SUGAR THREE TIMES DAILY, Disp: 300 each, Rfl: 3  •  aspirin 81 MG tablet, Take 81 mg by mouth Daily. LAST DOSE MARCH 1ST, Disp: , Rfl:   •  Blood Glucose Calibration (Accu-Chek Lyndsey) solution, , Disp: , Rfl:   •  colestipol (COLESTID) 1 g tablet, Take 1 g by mouth Every Night., Disp: , Rfl:   •   DULoxetine (CYMBALTA) 60 MG capsule, Take 1 capsule by mouth Every Night., Disp: 90 capsule, Rfl: 3  •  furosemide (LASIX) 40 MG tablet, TAKE 1 TABLET EVERY DAY, Disp: 90 tablet, Rfl: 1  •  gabapentin (NEURONTIN) 800 MG tablet, Take 1 tablet by mouth 2 (Two) Times a Day., Disp: 60 tablet, Rfl: 3  •  meloxicam (MOBIC) 15 MG tablet, Take 15 mg by mouth Every Night., Disp: , Rfl:   •  metFORMIN (GLUCOPHAGE) 850 MG tablet, TAKE 1 TABLET TWICE DAILY (Patient taking differently: Take 850 mg by mouth Every Night. HOLD METFORMIN Monday NIGHT), Disp: 180 tablet, Rfl: 1  •  metFORMIN ER (GLUCOPHAGE-XR) 750 MG 24 hr tablet, Take 1 tablet by mouth Daily With Breakfast., Disp: 90 tablet, Rfl: 3  •  metoprolol succinate XL (TOPROL-XL) 25 MG 24 hr tablet, TAKE 1 TABLET TWICE DAILY, Disp: 180 tablet, Rfl: 3  •  nitroglycerin (Nitrostat) 0.4 MG SL tablet, Place 1 tablet under the tongue Every 5 (Five) Minutes As Needed for Chest Pain. Take no more than 3 doses in 15 minutes., Disp: 30 tablet, Rfl: 12  •  polyethylene glycol (GoLYTELY) 236 g solution, Starting at noon on day prior to procedure, drink 8 ounces every 30 minutes until all gone or stools are clear. May add flavor packet., Disp: 4000 mL, Rfl: 0  •  potassium chloride (K-DUR,KLOR-CON) 10 MEQ CR tablet, TAKE 1 TABLET EVERY DAY, Disp: 90 tablet, Rfl: 3  •  rOPINIRole (REQUIP) 2 MG tablet, Take 1 tablet by mouth 3 (Three) Times a Day., Disp: 270 tablet, Rfl: 1  •  rOPINIRole (Requip) 3 MG tablet, Take 1 tablet by mouth 3 (Three) Times a Day., Disp: 90 tablet, Rfl: 5  •  simvastatin (ZOCOR) 40 MG tablet, TAKE 1 TABLET ONE TIME DAILY IN THE EVENING, Disp: 90 tablet, Rfl: 3    There are no discontinued medications.  Allergies   Allergen Reactions   • Morphine Shortness Of Breath        Social History     Tobacco Use   • Smoking status: Former Smoker     Types: Cigarettes   • Smokeless tobacco: Never Used   • Tobacco comment: quit 3 months ago   Vaping Use   • Vaping Use: Never  "used   Substance Use Topics   • Alcohol use: No   • Drug use: No       Family History   Problem Relation Age of Onset   • Hypertension Mother    • Heart attack Father    • Obesity Brother         Morbis   • Colon cancer Brother    • Colon cancer Other         Objective     /78   Pulse 70   Ht 149.9 cm (59\")   Wt 71.7 kg (158 lb)   BMI 31.91 kg/m²       Physical Exam    General Appearance:   · no acute distress  · Alert and oriented x3  HENT:   · lips not cyanotic  · Atraumatic  Neck:  · No jvd   · supple  Respiratory:  · no respiratory distress  · normal breath sounds  · no rales  Cardiovascular:  · no S3, no S4   · no murmur  · no rub  Extremities  · No cyanosis  · lower extremity edema: none    Skin:   · warm, dry  · No rashes      Result Review :     proBNP   Date Value Ref Range Status   05/25/2022 90.1 0.0 - 1,800.0 pg/mL Final     CMP    CMP 10/11/21 1/24/22 5/25/22   Glucose 63 (A) 79 102 (A)   BUN 14 11 10   Creatinine 0.77 0.86 0.99   eGFR Non African Am 72 64    Sodium 141 141 142   Potassium 4.5 4.2 4.3   Chloride 100 103 103   Calcium 9.2 9.4 9.6   Albumin 4.60 4.30 4.00   Total Bilirubin 0.4 0.2 0.4   Alkaline Phosphatase 71 70 62   AST (SGOT) 32 36 (A) 27   ALT (SGPT) 15 14 11   (A) Abnormal value            CBC w/diff    CBC w/Diff 10/11/21 5/25/22   WBC 5.74 6.10   RBC 4.88 4.70   Hemoglobin 14.2 14.0   Hematocrit 43.5 41.3   MCV 89.1 87.9   MCH 29.1 29.8   MCHC 32.6 33.9   RDW 14.3 14.1   Platelets 231 231   Neutrophil Rel % 50.2 62.9   Immature Granulocyte Rel % 0.3 0.5   Lymphocyte Rel % 38.5 26.6   Monocyte Rel % 8.4 8.9   Eosinophil Rel % 2.4 0.8   Basophil Rel % 0.2 0.3            Lab Results   Component Value Date    TSH 2.030 05/25/2022      Lab Results   Component Value Date    FREET4 1.08 05/25/2022      No results found for: DDIMERQUANT  Magnesium   Date Value Ref Range Status   05/20/2019 2.18 1.60 - 2.30 mg/dL Final      No results found for: DIGOXIN   Lab Results   Component " Value Date    TROPONINT <0.010 07/21/2022           Lipid Panel    Lipid Panel 10/11/21 1/24/22 5/25/22   Total Cholesterol 162 123 110   Triglycerides 164 (A) 173 (A) 121   HDL Cholesterol 32 (A) 34 (A) 32 (A)   VLDL Cholesterol 29 29 22   LDL Cholesterol  101 (A) 60 56   LDL/HDL Ratio 3.04 1.60 1.68   (A) Abnormal value            No results found for: POCTROP    Results for orders placed in visit on 01/14/20    SCANNED - ECHOCARDIOGRAM                 Diagnoses and all orders for this visit:    1. Chest pain, atypical (Primary)    2. Essential hypertension    3. Mixed dyslipidemia        Assessment:    -Atypical chest pain: Patient has been having recurrent episodes of atypical chest discomfort as described in HPI.  Her exam today is benign.  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  Previous coronary CTA in 2020 was unremarkable without evidence of obstructive CAD, however, her symptoms started about a year ago.  She will be scheduled for Lexiscan stress test to assess for myocardial ischemia.  Echo will be done for assessment of LVEF, wall motion and valvular function.  She will be started on isosorbide mononitrate 30 mg daily for the possibility of coronary microvascular dysfunction.  Continue aspirin, simvastatin and metoprolol.  She was advised to report to us or to the ER if her symptoms worsen.  Further recommendations to follow.    -Essential hypertension: Well-controlled on current regimen.  Continue the same.    -Mixed dyslipidemia: Continue statin therapy    Follow Up     No follow-ups on file.        Patient was given instructions and counseling regarding her condition or for health maintenance advice. Please see specific information pulled into the AVS if appropriate.

## 2022-08-19 ENCOUNTER — HOSPITAL ENCOUNTER (OUTPATIENT)
Dept: NUCLEAR MEDICINE | Facility: HOSPITAL | Age: 77
Discharge: HOME | End: 2022-08-19

## 2022-08-19 DIAGNOSIS — R07.89 CHEST PAIN, ATYPICAL: ICD-10-CM

## 2022-08-19 PROCEDURE — A9502 TC99M TETROFOSMIN: HCPCS | Performed by: INTERNAL MEDICINE

## 2022-08-19 PROCEDURE — 25010000002 REGADENOSON 0.4 MG/5ML SOLUTION: Performed by: INTERNAL MEDICINE

## 2022-08-19 PROCEDURE — 93016 CV STRESS TEST SUPVJ ONLY: CPT | Performed by: NURSE PRACTITIONER

## 2022-08-19 PROCEDURE — 78452 HT MUSCLE IMAGE SPECT MULT: CPT

## 2022-08-19 PROCEDURE — 0 TECHNETIUM TETROFOSMIN KIT: Performed by: INTERNAL MEDICINE

## 2022-08-19 PROCEDURE — 93017 CV STRESS TEST TRACING ONLY: CPT

## 2022-08-19 PROCEDURE — 93018 CV STRESS TEST I&R ONLY: CPT | Performed by: INTERNAL MEDICINE

## 2022-08-19 PROCEDURE — 78452 HT MUSCLE IMAGE SPECT MULT: CPT | Performed by: INTERNAL MEDICINE

## 2022-08-19 RX ADMIN — TETROFOSMIN 1 DOSE: 1.38 INJECTION, POWDER, LYOPHILIZED, FOR SOLUTION INTRAVENOUS at 10:15

## 2022-08-19 RX ADMIN — REGADENOSON 0.4 MG: 0.08 INJECTION, SOLUTION INTRAVENOUS at 10:15

## 2022-08-19 RX ADMIN — TETROFOSMIN 1 DOSE: 1.38 INJECTION, POWDER, LYOPHILIZED, FOR SOLUTION INTRAVENOUS at 08:00

## 2022-08-23 LAB
BH CV IMMEDIATE POST TECH DATA BLOOD PRESSURE: NORMAL MMHG
BH CV IMMEDIATE POST TECH DATA HEART RATE: 98 BPM
BH CV IMMEDIATE POST TECH DATA OXYGEN SATS: 98 %
BH CV REST NUCLEAR ISOTOPE DOSE: 9.9 MCI
BH CV SIX MINUTE RECOVERY TECH DATA BLOOD PRESSURE: NORMAL
BH CV SIX MINUTE RECOVERY TECH DATA HEART RATE: 99 BPM
BH CV SIX MINUTE RECOVERY TECH DATA OXYGEN SATURATION: 97 %
BH CV STRESS BP STAGE 1: NORMAL
BH CV STRESS COMMENTS STAGE 1: NORMAL
BH CV STRESS DOSE REGADENOSON STAGE 1: 0.4
BH CV STRESS DURATION MIN STAGE 1: 0
BH CV STRESS DURATION SEC STAGE 1: 10
BH CV STRESS HR STAGE 1: 100
BH CV STRESS NUCLEAR ISOTOPE DOSE: 37.5 MCI
BH CV STRESS O2 STAGE 1: 95
BH CV STRESS PROTOCOL 1: NORMAL
BH CV STRESS RECOVERY BP: NORMAL MMHG
BH CV STRESS RECOVERY HR: 99 BPM
BH CV STRESS RECOVERY O2: 97 %
BH CV STRESS STAGE 1: 1
BH CV THREE MINUTE POST TECH DATA BLOOD PRESSURE: NORMAL MMHG
BH CV THREE MINUTE POST TECH DATA HEART RATE: 97 BPM
BH CV THREE MINUTE POST TECH DATA OXYGEN SATURATION: 97 %
LV EF NUC BP: 77 %
MAXIMAL PREDICTED HEART RATE: 142 BPM
PERCENT MAX PREDICTED HR: 70.42 %
STRESS BASELINE BP: NORMAL MMHG
STRESS BASELINE HR: 88 BPM
STRESS O2 SAT REST: 97 %
STRESS PERCENT HR: 83 %
STRESS POST O2 SAT PEAK: 98 %
STRESS POST PEAK BP: NORMAL MMHG
STRESS POST PEAK HR: 100 BPM
STRESS TARGET HR: 121 BPM

## 2022-08-27 ENCOUNTER — APPOINTMENT (OUTPATIENT)
Dept: GENERAL RADIOLOGY | Facility: HOSPITAL | Age: 77
End: 2022-08-27

## 2022-08-27 ENCOUNTER — HOSPITAL ENCOUNTER (EMERGENCY)
Facility: HOSPITAL | Age: 77
Discharge: HOME | End: 2022-08-27
Attending: EMERGENCY MEDICINE | Admitting: EMERGENCY MEDICINE

## 2022-08-27 VITALS
HEART RATE: 88 BPM | HEIGHT: 63 IN | SYSTOLIC BLOOD PRESSURE: 114 MMHG | RESPIRATION RATE: 18 BRPM | OXYGEN SATURATION: 96 % | WEIGHT: 164.02 LBS | TEMPERATURE: 98.8 F | BODY MASS INDEX: 29.06 KG/M2 | DIASTOLIC BLOOD PRESSURE: 71 MMHG

## 2022-08-27 DIAGNOSIS — S70.02XA CONTUSION OF LEFT HIP, INITIAL ENCOUNTER: Primary | ICD-10-CM
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
## 2022-08-27 LAB
ALBUMIN SERPL-MCNC: 4 G/DL (ref 3.5–5.2)
ALBUMIN/GLOB SERPL: 1.7 G/DL
ALP SERPL-CCNC: 58 U/L (ref 39–117)
ALT SERPL W P-5'-P-CCNC: 18 U/L (ref 1–33)
ANION GAP SERPL CALCULATED.3IONS-SCNC: 11 MMOL/L (ref 5–15)
AST SERPL-CCNC: 36 U/L (ref 1–32)
BASOPHILS # BLD AUTO: 0.02 10*3/MM3 (ref 0–0.2)
BASOPHILS NFR BLD AUTO: 0.3 % (ref 0–1.5)
BILIRUB SERPL-MCNC: 0.2 MG/DL (ref 0–1.2)
BUN SERPL-MCNC: 14 MG/DL (ref 8–23)
BUN/CREAT SERPL: 15.9 (ref 7–25)
CALCIUM SPEC-SCNC: 9 MG/DL (ref 8.6–10.5)
CHLORIDE SERPL-SCNC: 105 MMOL/L (ref 98–107)
CO2 SERPL-SCNC: 28 MMOL/L (ref 22–29)
CREAT SERPL-MCNC: 0.88 MG/DL (ref 0.57–1)
DEPRECATED RDW RBC AUTO: 48.5 FL (ref 37–54)
EGFRCR SERPLBLD CKD-EPI 2021: 67.4 ML/MIN/1.73
EOSINOPHIL # BLD AUTO: 0.08 10*3/MM3 (ref 0–0.4)
EOSINOPHIL NFR BLD AUTO: 1 % (ref 0.3–6.2)
ERYTHROCYTE [DISTWIDTH] IN BLOOD BY AUTOMATED COUNT: 14.5 % (ref 12.3–15.4)
GLOBULIN UR ELPH-MCNC: 2.4 GM/DL
GLUCOSE SERPL-MCNC: 106 MG/DL (ref 65–99)
HCT VFR BLD AUTO: 38.4 % (ref 34–46.6)
HGB BLD-MCNC: 12.7 G/DL (ref 12–15.9)
HOLD SPECIMEN: NORMAL
HOLD SPECIMEN: NORMAL
IMM GRANULOCYTES # BLD AUTO: 0.05 10*3/MM3 (ref 0–0.05)
IMM GRANULOCYTES NFR BLD AUTO: 0.6 % (ref 0–0.5)
INR PPP: 0.96 (ref 0.86–1.15)
LYMPHOCYTES # BLD AUTO: 1.8 10*3/MM3 (ref 0.7–3.1)
LYMPHOCYTES NFR BLD AUTO: 23.3 % (ref 19.6–45.3)
MCH RBC QN AUTO: 30.1 PG (ref 26.6–33)
MCHC RBC AUTO-ENTMCNC: 33.1 G/DL (ref 31.5–35.7)
MCV RBC AUTO: 91 FL (ref 79–97)
MONOCYTES # BLD AUTO: 0.55 10*3/MM3 (ref 0.1–0.9)
MONOCYTES NFR BLD AUTO: 7.1 % (ref 5–12)
NEUTROPHILS NFR BLD AUTO: 5.21 10*3/MM3 (ref 1.7–7)
NEUTROPHILS NFR BLD AUTO: 67.7 % (ref 42.7–76)
NRBC BLD AUTO-RTO: 0 /100 WBC (ref 0–0.2)
PLATELET # BLD AUTO: 222 10*3/MM3 (ref 140–450)
PMV BLD AUTO: 9.5 FL (ref 6–12)
POTASSIUM SERPL-SCNC: 4.3 MMOL/L (ref 3.5–5.2)
PROT SERPL-MCNC: 6.4 G/DL (ref 6–8.5)
PROTHROMBIN TIME: 12.8 SECONDS (ref 11.8–14.9)
QT INTERVAL: 421 MS
RBC # BLD AUTO: 4.22 10*6/MM3 (ref 3.77–5.28)
SODIUM SERPL-SCNC: 144 MMOL/L (ref 136–145)
WBC NRBC COR # BLD: 7.71 10*3/MM3 (ref 3.4–10.8)
WHOLE BLOOD HOLD COAG: NORMAL
WHOLE BLOOD HOLD SPECIMEN: NORMAL

## 2022-08-27 PROCEDURE — 36415 COLL VENOUS BLD VENIPUNCTURE: CPT

## 2022-08-27 PROCEDURE — 80053 COMPREHEN METABOLIC PANEL: CPT | Performed by: EMERGENCY MEDICINE

## 2022-08-27 PROCEDURE — 85025 COMPLETE CBC W/AUTO DIFF WBC: CPT | Performed by: EMERGENCY MEDICINE

## 2022-08-27 PROCEDURE — 99284 EMERGENCY DEPT VISIT MOD MDM: CPT

## 2022-08-27 PROCEDURE — 85610 PROTHROMBIN TIME: CPT | Performed by: EMERGENCY MEDICINE

## 2022-08-27 PROCEDURE — 93005 ELECTROCARDIOGRAM TRACING: CPT | Performed by: EMERGENCY MEDICINE

## 2022-08-27 PROCEDURE — 73502 X-RAY EXAM HIP UNI 2-3 VIEWS: CPT

## 2022-08-27 PROCEDURE — 71045 X-RAY EXAM CHEST 1 VIEW: CPT

## 2022-08-27 RX ORDER — HYDROCODONE BITARTRATE AND ACETAMINOPHEN 5; 325 MG/1; MG/1
1 TABLET ORAL EVERY 6 HOURS PRN
Qty: 15 TABLET | Refills: 0 | Status: SHIPPED | OUTPATIENT
Start: 2022-08-27 | End: 2023-03-23

## 2022-08-27 RX ORDER — ROPINIROLE 1 MG/1
3 TABLET, FILM COATED ORAL ONCE
Status: COMPLETED | OUTPATIENT
Start: 2022-08-27 | End: 2022-08-27

## 2022-08-27 RX ORDER — HYDROCODONE BITARTRATE AND ACETAMINOPHEN 5; 325 MG/1; MG/1
1 TABLET ORAL EVERY 6 HOURS PRN
Status: DISCONTINUED | OUTPATIENT
Start: 2022-08-27 | End: 2022-08-27 | Stop reason: HOSPADM

## 2022-08-27 RX ORDER — ACETAMINOPHEN 325 MG/1
975 TABLET ORAL ONCE
Status: COMPLETED | OUTPATIENT
Start: 2022-08-27 | End: 2022-08-27

## 2022-08-27 RX ADMIN — HYDROCODONE BITARTRATE AND ACETAMINOPHEN 1 TABLET: 5; 325 TABLET ORAL at 10:46

## 2022-08-27 RX ADMIN — ACETAMINOPHEN 325MG 975 MG: 325 TABLET ORAL at 08:07

## 2022-08-27 RX ADMIN — ROPINIROLE HYDROCHLORIDE 3 MG: 1 TABLET, FILM COATED ORAL at 08:07

## 2022-08-29 ENCOUNTER — TELEPHONE (OUTPATIENT)
Dept: INTERNAL MEDICINE | Facility: CLINIC | Age: 77
End: 2022-08-29

## 2022-08-29 ENCOUNTER — PATIENT OUTREACH (OUTPATIENT)
Dept: CASE MANAGEMENT | Facility: OTHER | Age: 77
End: 2022-08-29

## 2022-08-29 DIAGNOSIS — E11.40 TYPE 2 DIABETES MELLITUS WITH DIABETIC NEUROPATHY, WITHOUT LONG-TERM CURRENT USE OF INSULIN: Primary | ICD-10-CM

## 2022-08-29 DIAGNOSIS — J44.9 CHRONIC OBSTRUCTIVE PULMONARY DISEASE, UNSPECIFIED COPD TYPE: ICD-10-CM

## 2022-08-29 NOTE — OUTREACH NOTE
AMBULATORY CASE MANAGEMENT NOTE    Name and Relationship of Patient/Support Person: Ivania Mattson - Self      Patient was identified from ER report. Seen in ED 8-27-22 for fall patient has multiple Care Gaps . I reached out to the patient and left a message to return call .     Education Documentation  No documentation found.        BRAULIO URIAS  Ambulatory Case Management    8/29/2022, 08:55 EDT

## 2022-08-29 NOTE — TELEPHONE ENCOUNTER
Hub staff attempted to follow warm transfer process and was unsuccessful     Caller: Ivania Mattson    Relationship to patient: Self    Best call back number: [phone]    Patient is needing: PATIENT RETURNING RONS CALL, PLEASE CALL AND ADVISE

## 2022-08-31 ENCOUNTER — OFFICE VISIT (OUTPATIENT)
Dept: PODIATRY | Facility: CLINIC | Age: 77
End: 2022-08-31

## 2022-08-31 VITALS
DIASTOLIC BLOOD PRESSURE: 57 MMHG | SYSTOLIC BLOOD PRESSURE: 101 MMHG | HEIGHT: 63 IN | HEART RATE: 78 BPM | WEIGHT: 159 LBS | TEMPERATURE: 97.1 F | BODY MASS INDEX: 28.17 KG/M2 | OXYGEN SATURATION: 99 %

## 2022-08-31 DIAGNOSIS — G62.9 NEUROPATHY: ICD-10-CM

## 2022-08-31 DIAGNOSIS — R26.2 DIFFICULTY WALKING: ICD-10-CM

## 2022-08-31 DIAGNOSIS — L60.0 ONYCHOCRYPTOSIS: ICD-10-CM

## 2022-08-31 DIAGNOSIS — M79.672 FOOT PAIN, BILATERAL: Primary | ICD-10-CM

## 2022-08-31 DIAGNOSIS — M79.671 FOOT PAIN, BILATERAL: Primary | ICD-10-CM

## 2022-08-31 DIAGNOSIS — E11.8 DIABETIC FOOT: ICD-10-CM

## 2022-08-31 DIAGNOSIS — B35.1 ONYCHOMYCOSIS: ICD-10-CM

## 2022-08-31 DIAGNOSIS — E11.9 NON-INSULIN DEPENDENT TYPE 2 DIABETES MELLITUS: ICD-10-CM

## 2022-08-31 PROCEDURE — 11721 DEBRIDE NAIL 6 OR MORE: CPT | Performed by: PODIATRIST

## 2022-08-31 PROCEDURE — 99203 OFFICE O/P NEW LOW 30 MIN: CPT | Performed by: PODIATRIST

## 2022-08-31 PROCEDURE — G8404 LOW EXTEMITY NEUR EXAM DOCUM: HCPCS | Performed by: PODIATRIST

## 2022-08-31 NOTE — PROGRESS NOTES
[facility] - PODIATRY    Today's Date: 08/31/22    Patient Name: Ivania Mattson  MRN: [id number]  [facility]: [id number]  PCP: Dillon Gardner MD, Last PCP Visit:  7/20/2022  Referring Provider: Referring, Self    SUBJECTIVE     Chief Complaint   Patient presents with   • Left Foot - Establish Care, Annual Exam, Diabetes, Numbness, Foot Swelling   • Right Foot - Establish Care, Annual Exam, Diabetes, Numbness, Foot Swelling     HPI: Ivania Mattson, a 76 y.o.female, presents to clinic for painful toenail and a diabetic foot evaluation.    New, Established, New Problem:  New  Location:  Toenails  Duration:   Greater than five years  Onset:  Gradual  Nature:  sore with palpation.  Stable, worsening, improving:   worsening  Aggravating factors:  Pain with shoe gear and ambulation.  Previous Treatment: Unable to trim their own toenails.    Patient controlling diabetes via:  Oral meds    Patient states their last blood glucose was:  123    Patient denies any fevers, chills, nausea, vomiting, shortness of breath, nor any other constitutional signs nor symptoms.    Numbness in lower extremities.    Past Medical History:   Diagnosis Date   • Acute pancreatitis    • Adenomatous polyp of stomach    • Allergy    • Alopecia    • C. difficile colitis    • Chronic lower back pain    • Depression    • Diabetes mellitus (HCC)    • Diabetes type 2, uncontrolled    • Difficulty breathing     At rest   • Edema    • Fatigue    • Heartburn    • Hiatal hernia    • History of colonoscopy     Polyp   • Hypercholesterolemia    • Insomnia    • Knee joint pain     Bilateral   • Loss of hair    • Morbid obesity (HCC)    • Numbness    • Polyphagia(783.6)    • Rash     skin folds   • Regurgitation    • Sinus drainage    • Skin cancer    • Skin rash     Beneath both breasts   • Sleep apnea    • Snoring    • Stress incontinence     Temporary urinary loss of control with cough and sneeze   • Tingling    • Wears  dentures    • Wears glasses      Past Surgical History:   Procedure Laterality Date   • BLADDER SURGERY      Tack   • COLONOSCOPY N/A 3/8/2022    Procedure: COLONOSCOPY WITH POLYPECTOMY;  Surgeon: Kody Pickard MD;  Location: [facility] ENDOSCOPY;  Service: Gastroenterology;  Laterality: N/A;  COLON POLYPS, DIVERTICULOSIS   • HYSTERECTOMY     • KNEE SURGERY Left    • LAPAROSCOPIC CHOLECYSTECTOMY     • LAPAROSCOPIC GASTRIC BANDING       Family History   Problem Relation Age of Onset   • Hypertension Mother    • Heart attack Father    • Obesity Brother         Morbis   • Colon cancer Brother    • Colon cancer Other      Social History     Socioeconomic History   • Marital status:    • Number of children: 5   Tobacco Use   • Smoking status: Former Smoker     Types: Cigarettes   • Smokeless tobacco: Never Used   • Tobacco comment: quit 3 months ago   Vaping Use   • Vaping Use: Never used   Substance and Sexual Activity   • Alcohol use: No   • Drug use: No   • Sexual activity: Defer     Allergies   Allergen Reactions   • Morphine Shortness Of Breath     Current Outpatient Medications   Medication Sig Dispense Refill   • Accu-Chek Lyndsey Plus test strip TEST BLOOD SUGAR THREE TIMES DAILY 300 each 3   • Accu-Chek Softclix Lancets lancets TEST BLOOD SUGAR THREE TIMES DAILY 300 each 3   • aspirin 81 MG tablet Take 81 mg by mouth Daily. LAST DOSE MARCH 1ST     • Blood Glucose Calibration (Accu-Chek Lyndsey) solution      • colestipol (COLESTID) 1 g tablet Take 1 g by mouth Every Night.     • DULoxetine (CYMBALTA) 60 MG capsule Take 1 capsule by mouth Every Night. 90 capsule 3   • furosemide (LASIX) 40 MG tablet TAKE 1 TABLET EVERY DAY 90 tablet 1   • gabapentin (NEURONTIN) 800 MG tablet Take 1 tablet by mouth 2 (Two) Times a Day. 60 tablet 3   • HYDROcodone-acetaminophen (NORCO) 5-325 MG per tablet Take 1 tablet by mouth Every 6 (Six) Hours As Needed for Moderate Pain . 15 tablet 0   • isosorbide mononitrate (IMDUR) 30  MG 24 hr tablet Take 1 tablet by mouth Daily. 30 tablet 11   • meloxicam (MOBIC) 15 MG tablet Take 15 mg by mouth Every Night.     • metFORMIN (GLUCOPHAGE) 850 MG tablet TAKE 1 TABLET TWICE DAILY (Patient taking differently: Take 850 mg by mouth Every Night. HOLD METFORMIN Monday NIGHT) 180 tablet 1   • metFORMIN ER (GLUCOPHAGE-XR) 750 MG 24 hr tablet Take 1 tablet by mouth Daily With Breakfast. 90 tablet 3   • metoprolol succinate XL (TOPROL-XL) 25 MG 24 hr tablet TAKE 1 TABLET TWICE DAILY 180 tablet 3   • nitroglycerin (Nitrostat) 0.4 MG SL tablet Place 1 tablet under the tongue Every 5 (Five) Minutes As Needed for Chest Pain. Take no more than 3 doses in 15 minutes. 30 tablet 12   • polyethylene glycol (GoLYTELY) 236 g solution Starting at noon on day prior to procedure, drink 8 ounces every 30 minutes until all gone or stools are clear. May add flavor packet. 4000 mL 0   • potassium chloride (K-DUR,KLOR-CON) 10 MEQ CR tablet TAKE 1 TABLET EVERY DAY 90 tablet 3   • rOPINIRole (REQUIP) 2 MG tablet Take 1 tablet by mouth 3 (Three) Times a Day. 270 tablet 1   • rOPINIRole (Requip) 3 MG tablet Take 1 tablet by mouth 3 (Three) Times a Day. 90 tablet 5   • simvastatin (ZOCOR) 40 MG tablet TAKE 1 TABLET ONE TIME DAILY IN THE EVENING 90 tablet 3     No current facility-administered medications for this visit.     Review of Systems   Constitutional: Negative.    Skin:        Painful toenails   Neurological: Positive for numbness.   All other systems reviewed and are negative.      OBJECTIVE     Vitals:    08/31/22 1354   BP: 101/57   Pulse: 78   Temp: 97.1 °F (36.2 °C)   SpO2: 99%       Body mass index is 28.17 kg/m².    Lab Results   Component Value Date    HGBA1C 6.20 (H) 05/25/2022       Lab Results   Component Value Date    GLUCOSE 106 (H) 08/27/2022    CALCIUM 9.0 08/27/2022     08/27/2022    K 4.3 08/27/2022    CO2 28.0 08/27/2022     08/27/2022    BUN 14 08/27/2022    CREATININE 0.88 08/27/2022     EGFRIFNONA 64 2022    BCR 15.9 2022    ANIONGAP 11.0 2022       Patient seen in no apparent distress.      PHYSICAL EXAM:     Foot/Ankle Exam:       General:   Diabetic Foot Exam Performed    Appearance: elderly    Appearance comment:  Chronically ill  Orientation: AAOx3    Affect: appropriate    Assistance: walker    Shoe Gear:  Sandals    VASCULAR      Right Foot Vascularity   Normal vascular exam    Dorsalis pedis:  1+  Posterior tibial:  1+  Skin Temperature: cool    Edema Gradin+ and pitting  CFT:  < 3 seconds  Pedal Hair Growth:  Absent  Varicosities: moderate varicosities       Left Foot Vascularity   Normal vascular exam    Dorsalis pedis:  1+  Posterior tibial:  1+  Skin Temperature: cool    Edema Gradin+ and pitting  CFT:  < 3 seconds  Pedal Hair Growth:  Absent  Varicosities: moderate varicosities        NEUROLOGIC     Right Foot Neurologic   Light touch sensation:  Normal  Vibratory sensation:  Normal  Hot/Cold sensation: normal    Protective Sensation using Stollings-Karime Monofilament:  10     Left Foot Neurologic   Light touch sensation:  Diminished  Vibratory sensation:  Diminished  Hot/cold sensation: diminished    Protective Sensation using Stollings-Karime Monofilament:  4     MUSCULOSKELETAL      Right Foot Musculoskeletal   Hammertoe:  Second toe, third toe, fourth toe and fifth toe  Hallux valgus: Yes       Left Foot Musculoskeletal   Hammertoe:  Second toe, third toe, fourth toe and fifth toe  Hallux valgus: Yes       MUSCLE STRENGTH     Right Foot Muscle Strength   Foot dorsiflexion:  4-  Foot plantar flexion:  4-  Foot inversion:  4-  Foot eversion:  4-     Left Foot Muscle Strength   Foot dorsiflexion:  4-  Foot plantar flexion:  4-  Foot inversion:  4-  Foot eversion:  4-     RANGE OF MOTION      Right Foot Range of Motion   Foot and ankle ROM within normal limits       Left Foot Range of Motion   Foot and ankle ROM within normal limits       DERMATOLOGIC      Right Foot Dermatologic   Skin: skin intact    Skin: no right foot blister    Nails: onychomycosis, abnormally thick, subungual debris and dystrophic nails    Nails comment:  Toenails 1, 2, 3, 4, and 5     Left Foot Dermatologic   Skin: skin intact    Nails: onychomycosis, abnormally thick, subungual debris, dystrophic nails and ingrown toenail    Nails comment:  Toenails 1, 3, 4, and 5      Diabetic Foot Exam Performed      ASSESSMENT/PLAN     Diagnoses and all orders for this visit:    1. Foot pain, bilateral (Primary)    2. Onychocryptosis    3. Onychomycosis    4. Diabetic foot (HCC)    5. Difficulty walking    6. Neuropathy    7. Non-insulin dependent type 2 diabetes mellitus (HCC)        Comprehensive lower extremity examination and evaluation was performed.    Discussed findings and treatment plan including risks, benefits, and treatment options with patient in detail. Patient agreed with treatment plan.    Medications and allergies reviewed.  Reviewed available blood glucose and HgB A1C lab values along with other pertinent labs.  These were discussed with the patient as to their importance of diabetic maintenance.    Toenails 1, 2, 3, 4, 5 on Right and 1, 3, 4, 5 on Left were debrided with nail nippers then filed with a Dremel nail jonathan.  Patient tolerated procedure well without complications.    Diabetic foot exam performed and documented this date, compliant with CQM required standards. Detail of findings as noted in physical exam.  Lower extremity Neurologic exam for diabetic patient performed and documented this date, compliant with PQRS required standards. Detail of findings as noted in physical exam.  Advised patient importance of good routine lower extremity hygiene. Advised patient importance of evaluating for intact skin and pain free nail borders.  Advised patient to use mirror to evaluate plantar/ soles of feet for better visualization. Advised patient monitor and phone office to be seen if any  cracking to skin, open lesions, painful nail borders or if nails become elongated prior to next visit. Advised patient importance of daily cleansing of lower extremities, followed by good skin cream to maintain normal hydration of skin. Also advised patient importance of close daily monitoring of blood sugar. Advised to regulate diet and medications to maintain control of blood sugar in optimal range. Contact primary care provider if difficulties maintaining blood sugar levels.  Advised Patient of presence of Diabetes Mellitus condition.  Advised Patient risk of progression and worsening or improvement, then return of condition.  Will monitor condition for any change in future. Treat with most appropriate treatment pending status of condition.  Counseled and advised patient extensively on nature and ramifications of diabetes. Standard instructions given to patient for good diabetic foot care and maintenance. Advised importance of careful monitoring to avoid break down and complications secondary to diabetes. Advised patient importance of strict maintenance of blood sugar control. Advised patient of possible ominous results from neglect of condition, i.e.: amputation/ loss of digits, feet and legs, or even death.  Patient states understands counseling, will monitor closely, continue good hygiene and routine diabetic foot care. Patient will contact office should they have any questions or problems.      An After Visit Summary was printed and given to the patient at discharge, including (if requested) any available informative/educational handouts regarding diagnosis, treatment, or medications. All questions were answered to patient/family satisfaction. Should symptoms fail to improve or worsen they agree to call or return to clinic or to go to the Emergency Department. Discussed the importance of following up with any needed screening tests/labs/specialist appointments and any requested follow-up recommended by me  today. Importance of maintaining follow-up discussed and patient accepts that missed appointments can delay diagnosis and potentially lead to worsening of conditions.    Return in about 9 weeks (around 11/2/2022) for Toenail Care., or sooner if acute issues arise.    This document has been electronically signed by Gunner Alvarado DPM on August 31, 2022 14:31 EDT

## 2022-09-02 ENCOUNTER — TELEPHONE (OUTPATIENT)
Dept: INTERNAL MEDICINE | Facility: CLINIC | Age: 77
End: 2022-09-02

## 2022-09-02 ENCOUNTER — PATIENT OUTREACH (OUTPATIENT)
Dept: CASE MANAGEMENT | Facility: OTHER | Age: 77
End: 2022-09-02

## 2022-09-02 DIAGNOSIS — E11.40 TYPE 2 DIABETES MELLITUS WITH DIABETIC NEUROPATHY, WITHOUT LONG-TERM CURRENT USE OF INSULIN: Primary | ICD-10-CM

## 2022-09-02 DIAGNOSIS — J44.9 CHRONIC OBSTRUCTIVE PULMONARY DISEASE, UNSPECIFIED COPD TYPE: ICD-10-CM

## 2022-09-02 NOTE — TELEPHONE ENCOUNTER
Caller: Ivania Mattson    Relationship to patient: Self    Best call back number: [phone]     Patient is needing: PATIENT IS RETURNING A CALL BACK TO BRAULIO. SHE STATED THAT HIS EXTENSION IS [id number]. PLEASE CALL AND ADVISE.

## 2022-09-02 NOTE — OUTREACH NOTE
AMBULATORY CASE MANAGEMENT NOTE    Name and Relationship of Patient/Support Person:  -       Patient was reached and I shared about the services available  It was found that the patient only needs short term assistance and will be placed in the HRCM program. The patient stated she has severe pain and can not seem to rest from it. Stated that she has never taken pain meds for it but she thinks its time. I asked if I could send request to PCP for pain management eval and she agreed. The patient also had multiple care gaps that were open and needed to be addressed . We addressed each of her care gaps and scheduled for injections and labs in the next 60 days. Order request sent to PCP for signature for MANSI and DEXA and Pain management . No further       Education Documentation  No documentation found.        BRAULIO URAIS  Ambulatory Case Management    9/2/2022, 15:40 EDT

## 2022-09-19 ENCOUNTER — TELEPHONE (OUTPATIENT)
Dept: INTERNAL MEDICINE | Facility: CLINIC | Age: 77
End: 2022-09-19

## 2022-09-19 NOTE — TELEPHONE ENCOUNTER
Caller: Ivania Mattson    Relationship: Self    Best call back number: [phone]    Who are you requesting to speak with (clinical staff, provider,  specific staff member): MEDICAL STAFF    What was the call regarding: PATIENT HAS BEEN USING A CPAP MACHINE FOR A WHILE AND HER CPAP MACHINE HAS STOPPED WORKING. SHE WOULD LIKE A NEW CPAP MACHINE AND THE EQUIPMENT THAT COMES WITH IT. THE ORIGINAL CPAP MASK THAT SHE WAS WEARING WAS HURTING HER FACE AS WELL AND SHE WOULD LIKE A DIFFERENT MASK. SHE ORIGINALLY GOT HER CPAP MACHINE FROM [facility]. SHE WOULD LIKE AN ORDER FOR A NEW MACHINE AND EQUIPMENT.

## 2022-09-26 ENCOUNTER — TELEPHONE (OUTPATIENT)
Dept: PODIATRY | Facility: CLINIC | Age: 77
End: 2022-09-26

## 2022-09-26 NOTE — TELEPHONE ENCOUNTER
Dr lux can you please addend her last office note on 7/20 and just put that she uses and benefits from cpap therapy under the sleep apnea so I can get her a new cpap ordered

## 2022-09-26 NOTE — TELEPHONE ENCOUNTER
Provider: DR. LUDWIG    Caller: PATIENT    Relationship to Patient: SELF    Phone Number:  [phone]    Reason for Call: PT. WAS SEEN ON 08/31/22.   SHE IS CALLING TO ASK ABOUT GETTING DIABETIC SHOES.   PLEASE CALL TO ADVISE.    When was the patient last seen: 08/31/22

## 2022-09-27 NOTE — TELEPHONE ENCOUNTER
Dr. Alvarado signed RX for diabetic shoes.    Janet will be scanning RX to chart and calling pt to let her know.

## 2022-09-28 ENCOUNTER — OFFICE VISIT (OUTPATIENT)
Dept: CARDIOLOGY | Facility: CLINIC | Age: 77
End: 2022-09-28

## 2022-09-28 VITALS
WEIGHT: 160 LBS | DIASTOLIC BLOOD PRESSURE: 79 MMHG | BODY MASS INDEX: 33.58 KG/M2 | HEIGHT: 58 IN | HEART RATE: 74 BPM | SYSTOLIC BLOOD PRESSURE: 128 MMHG

## 2022-09-28 DIAGNOSIS — R07.89 CHEST PAIN, ATYPICAL: Primary | ICD-10-CM

## 2022-09-28 DIAGNOSIS — I10 ESSENTIAL HYPERTENSION: ICD-10-CM

## 2022-09-28 DIAGNOSIS — E78.2 MIXED DYSLIPIDEMIA: ICD-10-CM

## 2022-09-28 PROCEDURE — 99213 OFFICE O/P EST LOW 20 MIN: CPT | Performed by: INTERNAL MEDICINE

## 2022-09-28 NOTE — PROGRESS NOTES
Chief Complaint  Chest Pain, Hypertension, and Dyslipidemia    Subjective      Patient is here for follow-up on recent testing which was done for chest discomfort.  Overall, she is feeling better compared to her last visit.  Her chest discomfort subsided with isosorbide mononitrate.  She has no other cardiac complaints or concerns today.  She has gait instability.  She walks with a walker.  She has easy bruising but denies idalmis bleeding.    Past Medical History:   Diagnosis Date   • Acute pancreatitis    • Adenomatous polyp of stomach    • Allergy    • Alopecia    • C. difficile colitis    • Chronic lower back pain    • Depression    • Diabetes mellitus (HCC)    • Diabetes type 2, uncontrolled    • Difficulty breathing     At rest   • Edema    • Fatigue    • Heartburn    • Hiatal hernia    • History of colonoscopy     Polyp   • Hypercholesterolemia    • Insomnia    • Knee joint pain     Bilateral   • Loss of hair    • Morbid obesity (HCC)    • Numbness    • Polyphagia(783.6)    • Rash     skin folds   • Regurgitation    • Sinus drainage    • Skin cancer    • Skin rash     Beneath both breasts   • Sleep apnea    • Snoring    • Stress incontinence     Temporary urinary loss of control with cough and sneeze   • Tingling    • Wears dentures    • Wears glasses          Current Outpatient Medications:   •  Accu-Chek Lyndsey Plus test strip, TEST BLOOD SUGAR THREE TIMES DAILY, Disp: 300 each, Rfl: 3  •  Accu-Chek Softclix Lancets lancets, TEST BLOOD SUGAR THREE TIMES DAILY, Disp: 300 each, Rfl: 3  •  aspirin 81 MG tablet, Take 81 mg by mouth Daily., Disp: , Rfl:   •  Blood Glucose Calibration (Accu-Chek Lyndsey) solution, , Disp: , Rfl:   •  colestipol (COLESTID) 1 g tablet, Take 1 g by mouth Every Night., Disp: , Rfl:   •  DULoxetine (CYMBALTA) 60 MG capsule, Take 1 capsule by mouth Every Night., Disp: 90 capsule, Rfl: 3  •  furosemide (LASIX) 40 MG tablet, TAKE 1 TABLET EVERY DAY, Disp: 90 tablet, Rfl: 1  •  gabapentin  (NEURONTIN) 800 MG tablet, Take 1 tablet by mouth 2 (Two) Times a Day., Disp: 60 tablet, Rfl: 3  •  HYDROcodone-acetaminophen (NORCO) 5-325 MG per tablet, Take 1 tablet by mouth Every 6 (Six) Hours As Needed for Moderate Pain ., Disp: 15 tablet, Rfl: 0  •  isosorbide mononitrate (IMDUR) 30 MG 24 hr tablet, Take 1 tablet by mouth Daily., Disp: 30 tablet, Rfl: 11  •  meloxicam (MOBIC) 15 MG tablet, Take 15 mg by mouth Every Night., Disp: , Rfl:   •  metFORMIN (GLUCOPHAGE) 850 MG tablet, TAKE 1 TABLET TWICE DAILY (Patient taking differently: Take 850 mg by mouth Every Night. HOLD METFORMIN Monday NIGHT), Disp: 180 tablet, Rfl: 1  •  metoprolol succinate XL (TOPROL-XL) 25 MG 24 hr tablet, TAKE 1 TABLET TWICE DAILY, Disp: 180 tablet, Rfl: 3  •  nitroglycerin (Nitrostat) 0.4 MG SL tablet, Place 1 tablet under the tongue Every 5 (Five) Minutes As Needed for Chest Pain. Take no more than 3 doses in 15 minutes., Disp: 30 tablet, Rfl: 12  •  potassium chloride (K-DUR,KLOR-CON) 10 MEQ CR tablet, TAKE 1 TABLET EVERY DAY, Disp: 90 tablet, Rfl: 3  •  rOPINIRole (Requip) 3 MG tablet, Take 1 tablet by mouth 3 (Three) Times a Day., Disp: 90 tablet, Rfl: 5  •  simvastatin (ZOCOR) 40 MG tablet, TAKE 1 TABLET ONE TIME DAILY IN THE EVENING, Disp: 90 tablet, Rfl: 3    Medications Discontinued During This Encounter   Medication Reason   • metFORMIN ER (GLUCOPHAGE-XR) 750 MG 24 hr tablet Duplicate order   • polyethylene glycol (GoLYTELY) 236 g solution *Therapy completed   • rOPINIRole (REQUIP) 2 MG tablet Duplicate order     Allergies   Allergen Reactions   • Morphine Shortness Of Breath        Social History     Tobacco Use   • Smoking status: Former Smoker     Types: Cigarettes   • Smokeless tobacco: Never Used   • Tobacco comment: quit 3 months ago   Vaping Use   • Vaping Use: Never used   Substance Use Topics   • Alcohol use: No   • Drug use: No       Family History   Problem Relation Age of Onset   • Hypertension Mother    • Heart  "attack Father    • Obesity Brother         Morbis   • Colon cancer Brother    • Colon cancer Other         Objective     /79   Pulse 74   Ht 147.3 cm (58\")   Wt 72.6 kg (160 lb)   BMI 33.44 kg/m²       Physical Exam    General Appearance:   · no acute distress  · Alert and oriented x3  HENT:   · lips not cyanotic  · Atraumatic  Neck:  · No jvd   · supple  Respiratory:  · no respiratory distress  · normal breath sounds  · no rales  Cardiovascular:  · no S3, no S4   · Faint systolic murmur at the base  · no rub  Extremities  · No cyanosis  · lower extremity edema: none    Skin:   · warm, dry  · No rashes      Result Review :     proBNP   Date Value Ref Range Status   05/25/2022 90.1 0.0 - 1,800.0 pg/mL Final     CMP    CMP 1/24/22 5/25/22 8/27/22   Glucose 79 102 (A) 106 (A)   BUN 11 10 14   Creatinine 0.86 0.99 0.88   eGFR Non African Am 64     Sodium 141 142 144   Potassium 4.2 4.3 4.3   Chloride 103 103 105   Calcium 9.4 9.6 9.0   Albumin 4.30 4.00 4.00   Total Bilirubin 0.2 0.4 0.2   Alkaline Phosphatase 70 62 58   AST (SGOT) 36 (A) 27 36 (A)   ALT (SGPT) 14 11 18   (A) Abnormal value            CBC w/diff    CBC w/Diff 10/11/21 5/25/22 8/27/22   WBC 5.74 6.10 7.71   RBC 4.88 4.70 4.22   Hemoglobin 14.2 14.0 12.7   Hematocrit 43.5 41.3 38.4   MCV 89.1 87.9 91.0   MCH 29.1 29.8 30.1   MCHC 32.6 33.9 33.1   RDW 14.3 14.1 14.5   Platelets 231 231 222   Neutrophil Rel % 50.2 62.9 67.7   Immature Granulocyte Rel % 0.3 0.5 0.6 (A)   Lymphocyte Rel % 38.5 26.6 23.3   Monocyte Rel % 8.4 8.9 7.1   Eosinophil Rel % 2.4 0.8 1.0   Basophil Rel % 0.2 0.3 0.3   (A) Abnormal value             Lab Results   Component Value Date    TSH 2.030 05/25/2022      Lab Results   Component Value Date    FREET4 1.08 05/25/2022      No results found for: DDIMERQUANT  Magnesium   Date Value Ref Range Status   05/20/2019 2.18 1.60 - 2.30 mg/dL Final      No results found for: DIGOXIN   Lab Results   Component Value Date    TROPONINT " <0.010 07/21/2022           Lipid Panel    Lipid Panel 10/11/21 1/24/22 5/25/22   Total Cholesterol 162 123 110   Triglycerides 164 (A) 173 (A) 121   HDL Cholesterol 32 (A) 34 (A) 32 (A)   VLDL Cholesterol 29 29 22   LDL Cholesterol  101 (A) 60 56   LDL/HDL Ratio 3.04 1.60 1.68   (A) Abnormal value            No results found for: POCTROP    Results for orders placed in visit on 09/07/22    Adult Transthoracic Echo Complete W/ Cont if Necessary Per Protocol    Interpretation Summary  · Left ventricular wall thickness is consistent with mild septal asymmetric hypertrophy.  · Calculated left ventricular EF = 68% Estimated left ventricular EF was in agreement with the calculated left ventricular EF.  · There is moderate, posterior mitral leaflet thickening present.  · Chordal YIN was noted.  · Left ventricular diastolic function is consistent with (grade I) impaired relaxation and age.  · Estimated right ventricular systolic pressure from tricuspid regurgitation is normal (<35 mmHg).                 Diagnoses and all orders for this visit:    1. Chest pain, atypical (Primary)    2. Essential hypertension    3. Mixed dyslipidemia      Assessment:     -Atypical chest pain: More likely, her chest discomfort is related to coronary microvascular dysfunction.  Recent myocardial perfusion scan was unremarkable without significant myocardial ischemia.  Echo showed normal LV systolic function.  Previous coronary CTA from 2020 was unremarkable without obstructive CAD.  Her chest discomfort improved with isosorbide mononitrate which will be continued.  In addition, she will be continued on aspirin, metoprolol and statin.  She was advised to report to us if her chest discomfort worsens.    -Essential hypertension: Well-controlled on current regimen.  Continue the same.     -Mixed dyslipidemia: Continue statin therapy      Follow Up     Return in about 6 months (around 3/28/2023) for With Mary ABDI.        Patient was  given instructions and counseling regarding her condition or for health maintenance advice. Please see specific information pulled into the AVS if appropriate.

## 2022-10-12 ENCOUNTER — TELEPHONE (OUTPATIENT)
Dept: INTERNAL MEDICINE | Facility: CLINIC | Age: 77
End: 2022-10-12

## 2022-10-12 DIAGNOSIS — M79.89 LIMB SWELLING: ICD-10-CM

## 2022-10-12 DIAGNOSIS — E78.2 MIXED HYPERLIPIDEMIA: ICD-10-CM

## 2022-10-12 DIAGNOSIS — I10 ESSENTIAL HYPERTENSION: ICD-10-CM

## 2022-10-12 DIAGNOSIS — E66.9 OBESITY (BMI 30-39.9): ICD-10-CM

## 2022-10-12 DIAGNOSIS — M47.816 LUMBAR SPONDYLOSIS: ICD-10-CM

## 2022-10-12 DIAGNOSIS — M54.50 CHRONIC BILATERAL LOW BACK PAIN WITHOUT SCIATICA: ICD-10-CM

## 2022-10-12 DIAGNOSIS — M48.062 SPINAL STENOSIS OF LUMBAR REGION WITH NEUROGENIC CLAUDICATION: ICD-10-CM

## 2022-10-12 DIAGNOSIS — G25.81 RESTLESS LEG SYNDROME: ICD-10-CM

## 2022-10-12 DIAGNOSIS — F32.A DEPRESSION, UNSPECIFIED DEPRESSION TYPE: ICD-10-CM

## 2022-10-12 DIAGNOSIS — E11.9 DIABETES MELLITUS WITH HEMOGLOBIN A1C GOAL OF 7.0%-8.0%: ICD-10-CM

## 2022-10-12 DIAGNOSIS — G47.33 OBSTRUCTIVE APNEA: ICD-10-CM

## 2022-10-12 DIAGNOSIS — E11.40 TYPE 2 DIABETES MELLITUS WITH DIABETIC NEUROPATHY, WITHOUT LONG-TERM CURRENT USE OF INSULIN: ICD-10-CM

## 2022-10-12 DIAGNOSIS — G89.29 CHRONIC BILATERAL LOW BACK PAIN WITHOUT SCIATICA: ICD-10-CM

## 2022-10-12 DIAGNOSIS — E55.9 VITAMIN D DEFICIENCY: ICD-10-CM

## 2022-10-12 NOTE — TELEPHONE ENCOUNTER
Pharmacy Name:  [facility] - [facility] [address]  - [phone]  - [phone] FX  [phone]    What medication are you calling in regards to:   gabapentin (NEURONTIN) 800 MG tablet  rOPINIRole (Requip) 3 MG tablet      What question does the pharmacy have: PHARMACY STATES THAT THIS PATIENT IS NEEDING REFILLS ON THE LISTED MEDICATIONS.

## 2022-10-13 RX ORDER — GABAPENTIN 800 MG/1
800 TABLET ORAL 2 TIMES DAILY
Qty: 60 TABLET | Refills: 3 | Status: SHIPPED | OUTPATIENT
Start: 2022-10-13

## 2022-10-13 RX ORDER — ROPINIROLE 3 MG/1
3 TABLET, FILM COATED ORAL 3 TIMES DAILY
Qty: 90 TABLET | Refills: 5 | Status: SHIPPED | OUTPATIENT
Start: 2022-10-13

## 2022-10-14 DIAGNOSIS — R07.89 CHEST PAIN, ATYPICAL: ICD-10-CM

## 2022-10-14 RX ORDER — ISOSORBIDE MONONITRATE 30 MG/1
30 TABLET, EXTENDED RELEASE ORAL DAILY
Qty: 90 TABLET | Refills: 3 | Status: SHIPPED | OUTPATIENT
Start: 2022-10-14

## 2022-10-14 NOTE — TELEPHONE ENCOUNTER
Rx Refill Note  Requested Prescriptions     Pending Prescriptions Disp Refills   • isosorbide mononitrate (IMDUR) 30 MG 24 hr tablet 90 tablet 3     Sig: Take 1 tablet by mouth Daily.      Last office visit with prescribing clinician: 9/28/2022      Next office visit with prescribing clinician: 03/28/2023           Jhonny Dudley  10/14/22, 09:55 EDT

## 2022-11-21 ENCOUNTER — CLINICAL SUPPORT (OUTPATIENT)
Dept: INTERNAL MEDICINE | Facility: CLINIC | Age: 77
End: 2022-11-21

## 2022-11-21 DIAGNOSIS — Z23 FLU VACCINE NEED: Primary | ICD-10-CM

## 2022-11-21 PROCEDURE — 90662 IIV NO PRSV INCREASED AG IM: CPT | Performed by: INTERNAL MEDICINE

## 2022-11-21 PROCEDURE — G0008 ADMIN INFLUENZA VIRUS VAC: HCPCS | Performed by: INTERNAL MEDICINE

## 2022-11-28 ENCOUNTER — LAB (OUTPATIENT)
Dept: LAB | Facility: HOSPITAL | Age: 77
End: 2022-11-28

## 2022-11-28 DIAGNOSIS — M47.816 LUMBAR SPONDYLOSIS: ICD-10-CM

## 2022-11-28 DIAGNOSIS — E78.2 MIXED HYPERLIPIDEMIA: ICD-10-CM

## 2022-11-28 DIAGNOSIS — M48.062 SPINAL STENOSIS OF LUMBAR REGION WITH NEUROGENIC CLAUDICATION: ICD-10-CM

## 2022-11-28 DIAGNOSIS — E66.9 OBESITY (BMI 30-39.9): ICD-10-CM

## 2022-11-28 DIAGNOSIS — G25.81 RESTLESS LEG SYNDROME: ICD-10-CM

## 2022-11-28 DIAGNOSIS — M79.89 LIMB SWELLING: ICD-10-CM

## 2022-11-28 DIAGNOSIS — G47.33 OBSTRUCTIVE APNEA: ICD-10-CM

## 2022-11-28 DIAGNOSIS — E11.40 TYPE 2 DIABETES MELLITUS WITH DIABETIC NEUROPATHY, WITHOUT LONG-TERM CURRENT USE OF INSULIN: ICD-10-CM

## 2022-11-28 DIAGNOSIS — E11.9 DIABETES MELLITUS WITH HEMOGLOBIN A1C GOAL OF 7.0%-8.0%: ICD-10-CM

## 2022-11-28 DIAGNOSIS — E55.9 VITAMIN D DEFICIENCY: ICD-10-CM

## 2022-11-28 DIAGNOSIS — F32.A DEPRESSION, UNSPECIFIED DEPRESSION TYPE: ICD-10-CM

## 2022-11-28 DIAGNOSIS — I10 ESSENTIAL HYPERTENSION: ICD-10-CM

## 2022-11-28 DIAGNOSIS — M54.50 CHRONIC BILATERAL LOW BACK PAIN WITHOUT SCIATICA: ICD-10-CM

## 2022-11-28 DIAGNOSIS — G89.29 CHRONIC BILATERAL LOW BACK PAIN WITHOUT SCIATICA: ICD-10-CM

## 2022-11-28 LAB
ALBUMIN SERPL-MCNC: 4.3 G/DL (ref 3.5–5.2)
ALBUMIN/GLOB SERPL: 1.6 G/DL
ALP SERPL-CCNC: 65 U/L (ref 39–117)
ALT SERPL W P-5'-P-CCNC: 21 U/L (ref 1–33)
ANION GAP SERPL CALCULATED.3IONS-SCNC: 11 MMOL/L (ref 5–15)
AST SERPL-CCNC: 35 U/L (ref 1–32)
BASOPHILS # BLD AUTO: 0.01 10*3/MM3 (ref 0–0.2)
BASOPHILS NFR BLD AUTO: 0.2 % (ref 0–1.5)
BILIRUB SERPL-MCNC: 0.2 MG/DL (ref 0–1.2)
BUN SERPL-MCNC: 9 MG/DL (ref 8–23)
BUN/CREAT SERPL: 10.7 (ref 7–25)
CALCIUM SPEC-SCNC: 9.8 MG/DL (ref 8.6–10.5)
CHLORIDE SERPL-SCNC: 102 MMOL/L (ref 98–107)
CO2 SERPL-SCNC: 27 MMOL/L (ref 22–29)
CREAT SERPL-MCNC: 0.84 MG/DL (ref 0.57–1)
DEPRECATED RDW RBC AUTO: 42.7 FL (ref 37–54)
EGFRCR SERPLBLD CKD-EPI 2021: 70.8 ML/MIN/1.73
EOSINOPHIL # BLD AUTO: 0.1 10*3/MM3 (ref 0–0.4)
EOSINOPHIL NFR BLD AUTO: 2 % (ref 0.3–6.2)
ERYTHROCYTE [DISTWIDTH] IN BLOOD BY AUTOMATED COUNT: 13.5 % (ref 12.3–15.4)
GLOBULIN UR ELPH-MCNC: 2.7 GM/DL
GLUCOSE SERPL-MCNC: 95 MG/DL (ref 65–99)
HBA1C MFR BLD: 5.6 % (ref 4.8–5.6)
HCT VFR BLD AUTO: 41.2 % (ref 34–46.6)
HGB BLD-MCNC: 13.5 G/DL (ref 12–15.9)
IMM GRANULOCYTES # BLD AUTO: 0.03 10*3/MM3 (ref 0–0.05)
IMM GRANULOCYTES NFR BLD AUTO: 0.6 % (ref 0–0.5)
LYMPHOCYTES # BLD AUTO: 2.09 10*3/MM3 (ref 0.7–3.1)
LYMPHOCYTES NFR BLD AUTO: 41.5 % (ref 19.6–45.3)
MAGNESIUM SERPL-MCNC: 2.5 MG/DL (ref 1.6–2.4)
MCH RBC QN AUTO: 28.8 PG (ref 26.6–33)
MCHC RBC AUTO-ENTMCNC: 32.8 G/DL (ref 31.5–35.7)
MCV RBC AUTO: 88 FL (ref 79–97)
MONOCYTES # BLD AUTO: 0.49 10*3/MM3 (ref 0.1–0.9)
MONOCYTES NFR BLD AUTO: 9.7 % (ref 5–12)
NEUTROPHILS NFR BLD AUTO: 2.32 10*3/MM3 (ref 1.7–7)
NEUTROPHILS NFR BLD AUTO: 46 % (ref 42.7–76)
NRBC BLD AUTO-RTO: 0 /100 WBC (ref 0–0.2)
PLATELET # BLD AUTO: 252 10*3/MM3 (ref 140–450)
PMV BLD AUTO: 9.9 FL (ref 6–12)
POTASSIUM SERPL-SCNC: 4.4 MMOL/L (ref 3.5–5.2)
PROT SERPL-MCNC: 7 G/DL (ref 6–8.5)
RBC # BLD AUTO: 4.68 10*6/MM3 (ref 3.77–5.28)
SODIUM SERPL-SCNC: 140 MMOL/L (ref 136–145)
WBC NRBC COR # BLD: 5.04 10*3/MM3 (ref 3.4–10.8)

## 2022-11-28 PROCEDURE — 83735 ASSAY OF MAGNESIUM: CPT

## 2022-11-28 PROCEDURE — 80053 COMPREHEN METABOLIC PANEL: CPT

## 2022-11-28 PROCEDURE — 36415 COLL VENOUS BLD VENIPUNCTURE: CPT

## 2022-11-28 PROCEDURE — 83036 HEMOGLOBIN GLYCOSYLATED A1C: CPT

## 2022-11-28 PROCEDURE — 85025 COMPLETE CBC W/AUTO DIFF WBC: CPT

## 2022-11-29 ENCOUNTER — OFFICE VISIT (OUTPATIENT)
Dept: INTERNAL MEDICINE | Facility: CLINIC | Age: 77
End: 2022-11-29

## 2022-11-29 VITALS
TEMPERATURE: 97.4 F | DIASTOLIC BLOOD PRESSURE: 80 MMHG | OXYGEN SATURATION: 93 % | SYSTOLIC BLOOD PRESSURE: 127 MMHG | HEART RATE: 108 BPM | BODY MASS INDEX: 34.8 KG/M2 | WEIGHT: 165.8 LBS | HEIGHT: 58 IN

## 2022-11-29 DIAGNOSIS — E66.9 OBESITY (BMI 30-39.9): ICD-10-CM

## 2022-11-29 DIAGNOSIS — M79.89 LIMB SWELLING: ICD-10-CM

## 2022-11-29 DIAGNOSIS — G25.81 RESTLESS LEG SYNDROME: Primary | ICD-10-CM

## 2022-11-29 DIAGNOSIS — F32.A DEPRESSION, UNSPECIFIED DEPRESSION TYPE: ICD-10-CM

## 2022-11-29 DIAGNOSIS — E78.2 MIXED HYPERLIPIDEMIA: ICD-10-CM

## 2022-11-29 DIAGNOSIS — G47.33 OBSTRUCTIVE APNEA: ICD-10-CM

## 2022-11-29 DIAGNOSIS — E11.9 DIABETES MELLITUS WITH HEMOGLOBIN A1C GOAL OF 7.0%-8.0%: ICD-10-CM

## 2022-11-29 DIAGNOSIS — M48.062 SPINAL STENOSIS OF LUMBAR REGION WITH NEUROGENIC CLAUDICATION: ICD-10-CM

## 2022-11-29 DIAGNOSIS — I50.32 CHRONIC DIASTOLIC CONGESTIVE HEART FAILURE: ICD-10-CM

## 2022-11-29 DIAGNOSIS — I10 ESSENTIAL HYPERTENSION: ICD-10-CM

## 2022-11-29 DIAGNOSIS — M06.9 RHEUMATOID ARTHRITIS, INVOLVING UNSPECIFIED SITE, UNSPECIFIED WHETHER RHEUMATOID FACTOR PRESENT: ICD-10-CM

## 2022-11-29 DIAGNOSIS — E55.9 VITAMIN D DEFICIENCY: ICD-10-CM

## 2022-11-29 PROCEDURE — 99214 OFFICE O/P EST MOD 30 MIN: CPT | Performed by: INTERNAL MEDICINE

## 2022-11-29 NOTE — PROGRESS NOTES
"Chief Complaint/ HPI: Follow-up with diabetes blood pressure congestive heart failure and lower extremity edema she had lab work done she says she is doing okay but she is tired she is moving to a smaller house, her legs swell quite a bit through the day, heart rate always stays a little fast she says,          Objective   Vital Signs  Vitals:    11/29/22 1318   BP: 127/80   Pulse: 108   Temp: 97.4 °F (36.3 °C)   SpO2: 93%   Weight: 75.2 kg (165 lb 12.8 oz)   Height: 147.3 cm (57.99\")      Body mass index is 34.66 kg/m².  Review of Systems   HENT: Negative.    Eyes: Negative.    Respiratory: Negative.    Cardiovascular: Negative.    Gastrointestinal: Negative.    Endocrine: Negative.    Genitourinary: Negative.    Musculoskeletal: Positive for joint swelling.   Allergic/Immunologic: Negative.    Neurological: Positive for weakness.   Hematological: Negative.    Psychiatric/Behavioral: Negative.       Physical Exam  Constitutional:       General: She is not in acute distress.     Appearance: Normal appearance. She is obese.   HENT:      Head: Normocephalic.   Eyes:      Conjunctiva/sclera: Conjunctivae normal.      Pupils: Pupils are equal, round, and reactive to light.   Cardiovascular:      Rate and Rhythm: Normal rate and regular rhythm.      Pulses: Normal pulses.      Heart sounds: Normal heart sounds.   Pulmonary:      Effort: Pulmonary effort is normal.      Breath sounds: Normal breath sounds.   Abdominal:      General: Bowel sounds are normal.      Palpations: Abdomen is soft.   Musculoskeletal:         General: No swelling. Normal range of motion.      Cervical back: Neck supple.      Right lower leg: Edema (2-3+ edema left 1-2+ on the right with some abrasions noted, fragile skin) present.      Left lower leg: Edema present.   Skin:     General: Skin is warm and dry.      Coloration: Skin is not jaundiced.   Neurological:      General: No focal deficit present.      Mental Status: She is alert and oriented " to person, place, and time. Mental status is at baseline.   Psychiatric:         Mood and Affect: Mood normal.         Behavior: Behavior normal.         Thought Content: Thought content normal.         Judgment: Judgment normal.        Result Review :   Lab Results   Component Value Date    PROBNP 90.1 05/25/2022    PROBNP 44.6 10/11/2021    BNP 80 05/20/2019     05/16/2019    BNP 4275 (H) 05/12/2019     CMP    CMP 5/25/22 8/27/22 11/28/22   Glucose 102 (A) 106 (A) 95   BUN 10 14 9   Creatinine 0.99 0.88 0.84   Sodium 142 144 140   Potassium 4.3 4.3 4.4   Chloride 103 105 102   Calcium 9.6 9.0 9.8   Albumin 4.00 4.00 4.30   Total Bilirubin 0.4 0.2 0.2   Alkaline Phosphatase 62 58 65   AST (SGOT) 27 36 (A) 35 (A)   ALT (SGPT) 11 18 21   (A) Abnormal value            CBC w/diff    CBC w/Diff 5/25/22 8/27/22 11/28/22   WBC 6.10 7.71 5.04   RBC 4.70 4.22 4.68   Hemoglobin 14.0 12.7 13.5   Hematocrit 41.3 38.4 41.2   MCV 87.9 91.0 88.0   MCH 29.8 30.1 28.8   MCHC 33.9 33.1 32.8   RDW 14.1 14.5 13.5   Platelets 231 222 252   Neutrophil Rel % 62.9 67.7 46.0   Immature Granulocyte Rel % 0.5 0.6 (A) 0.6 (A)   Lymphocyte Rel % 26.6 23.3 41.5   Monocyte Rel % 8.9 7.1 9.7   Eosinophil Rel % 0.8 1.0 2.0   Basophil Rel % 0.3 0.3 0.2   (A) Abnormal value             Lipid Panel    Lipid Panel 1/24/22 5/25/22   Total Cholesterol 123 110   Triglycerides 173 (A) 121   HDL Cholesterol 34 (A) 32 (A)   VLDL Cholesterol 29 22   LDL Cholesterol  60 56   LDL/HDL Ratio 1.60 1.68   (A) Abnormal value             Lab Results   Component Value Date    TSH 2.030 05/25/2022    TSH 1.650 10/11/2021    TSH 1.490 05/10/2019      Lab Results   Component Value Date    FREET4 1.08 05/25/2022    FREET4 1.4 05/10/2019      A1C Last 3 Results    HGBA1C Last 3 Results 1/24/22 5/25/22 11/28/22   Hemoglobin A1C 6.85 (A) 6.20 (A) 5.60   (A) Abnormal value                              Visit Diagnoses:    ICD-10-CM ICD-9-CM   1. Restless leg syndrome   G25.81 333.94   2. Spinal stenosis of lumbar region with neurogenic claudication  M48.062 724.03   3. Limb swelling  M79.89 729.81   4. Diabetes mellitus with hemoglobin A1c goal of 7.0%-8.0% ([facility])  E11.9 250.00   5. Chronic diastolic congestive heart failure ([facility])  I50.32 428.32     428.0   6. Vitamin D deficiency  E55.9 268.9   7. Rheumatoid arthritis, involving unspecified site, unspecified whether rheumatoid factor present ([facility])  M06.9 714.0   8. Obesity (BMI 30-39.9)  E66.9 278.00   9. Essential hypertension  I10 401.9   10. Depression, unspecified depression type  F32.A 311   11. Mixed hyperlipidemia  E78.2 272.2   12. Obstructive apnea  G47.33 327.23       Assessment and Plan   Diagnoses and all orders for this visit:    1. Restless leg syndrome (Primary)  -     Comprehensive Metabolic Panel; Future  -     CBC & Differential; Future  -     proBNP; Future  -     Hemoglobin A1c; Future  -     Lipid Panel; Future    2. Spinal stenosis of lumbar region with neurogenic claudication  -     Comprehensive Metabolic Panel; Future  -     CBC & Differential; Future  -     proBNP; Future  -     Hemoglobin A1c; Future  -     Lipid Panel; Future    3. Limb swelling  -     Comprehensive Metabolic Panel; Future  -     CBC & Differential; Future  -     proBNP; Future  -     Hemoglobin A1c; Future  -     Lipid Panel; Future    4. Diabetes mellitus with hemoglobin A1c goal of 7.0%-8.0% ([facility])  -     Comprehensive Metabolic Panel; Future  -     CBC & Differential; Future  -     proBNP; Future  -     Hemoglobin A1c; Future  -     Lipid Panel; Future    5. Chronic diastolic congestive heart failure ([facility])  -     Comprehensive Metabolic Panel; Future  -     CBC & Differential; Future  -     proBNP; Future  -     Hemoglobin A1c; Future  -     Lipid Panel; Future    6. Vitamin D deficiency  -     Comprehensive Metabolic Panel; Future  -     CBC & Differential; Future  -     proBNP; Future  -     Hemoglobin A1c; Future  -     Lipid  Panel; Future    7. Rheumatoid arthritis, involving unspecified site, unspecified whether rheumatoid factor present (HCC)  -     Comprehensive Metabolic Panel; Future  -     CBC & Differential; Future  -     proBNP; Future  -     Hemoglobin A1c; Future  -     Lipid Panel; Future    8. Obesity (BMI 30-39.9)  -     Comprehensive Metabolic Panel; Future  -     CBC & Differential; Future  -     proBNP; Future  -     Hemoglobin A1c; Future  -     Lipid Panel; Future    9. Essential hypertension  -     Comprehensive Metabolic Panel; Future  -     CBC & Differential; Future  -     proBNP; Future  -     Hemoglobin A1c; Future  -     Lipid Panel; Future    10. Depression, unspecified depression type  -     Comprehensive Metabolic Panel; Future  -     CBC & Differential; Future  -     proBNP; Future  -     Hemoglobin A1c; Future  -     Lipid Panel; Future    11. Mixed hyperlipidemia  -     Comprehensive Metabolic Panel; Future  -     CBC & Differential; Future  -     proBNP; Future  -     Hemoglobin A1c; Future  -     Lipid Panel; Future    12. Obstructive apnea  -     Comprehensive Metabolic Panel; Future  -     CBC & Differential; Future  -     proBNP; Future  -     Hemoglobin A1c; Future  -     Lipid Panel; Future    Chest pains, coming and going over the past couple months getting worse, intense pains center of the chest, patient needs cardiology evaluation as soon as possible, possible stress test or cardiac cath, patient will continue aspirin therapy,, statin therapy, beta-blockers metoprolol,--- we will do EKG today, and refer to second cardiology opinion Dr. Walker ---I discussed case with dr romero and will be seen soon , info sent to him July 20, 2022---, cardiac stress test August 23, 2022, ---showed normal left ventricle size ejection fraction 70% no wall motion abnormalities low risk myocardial perfusion study, with Lexiscan, echocardiogram September 9, 2022 showed normal ejection fraction 68% posterior  mitral valve leaflet thickening chordal Gavino was noted, diastolic dysfunction with mild septal asymmetric hypertrophy, chest x-ray no active disease August 27, 2022    Type 2 diabetes hemoglobin A1c  Down to 5.6 nov 2022-----continues metformin 850 MG QD     Mild elevation AST 35 November 28, 2022, stable    Frequent loose stools, incontinence at times, discussed changing Metformin to extended release, continues on colestipol since she has had her gallbladder removed,    Peripheral arterial disease left foot pain arterial evaluation March 15, 2022 shows EDEL left and right to be normal,---    Lumbar spinal stenosis previous work-up with [facility] December 2021, was referred to pain management for trigger point injections    Previous left total knee arthroplasty, laparoscopic cholecystectomy March 2016, previous colonoscopies polypectomies, right breast biopsy, right hip surgery, lap band surgery, ADRIANO, recent back surgery July 2018,    Chronic pain, continue Cymbalta,    Depression anxiety, continues on Cymbalta 60 mg daily    Peripheral neuropathy lower legs,---CONT GABAPENTIN 600 MG TID     Previous pelvic fractures    Diverticulosis and diverticulitis in September 2015    C. difficile colitis diagnosed February 2016 treated resolved    History of small bowel enteritis small bowel perforation resolved 2016    Kidney stones    Alcohol use in the past,    Previous ERCP Common bile duct stone cholelithiasis choledocholithiasis with stent placement and sphincterotomy February 29, 2016, status post laparoscopic cholecystectomy Dr. Huizar    History of urinary tract infection    Paroxysmal atrial fibrillation May 2019 with RVR, clinically in sinus rhythm    Hypertension, continues metoprolol XL 25 mg daily,    Obstructive sleep apnea, patient needs a new CPAP machine she has been diagnosed with obstructive sleep apnea and needs this machine in order to avoid hypoxic respiratory issues at night, she has  significant apnea hypopnea and require CPAP for treatment, she is compliant with the CPAP device,    Chronic lower extremity edema, continues on Lasix 40 mg daily, potassium supplements,, BR NP levels are extremely low at 90-- May 25, 2022, recommend compression devices rather than more diuretics given the kidney numbers and heart numbers being good, November 2022    Elevated cholesterol continues on simvastatin 40 mg daily    Follow Up   Return in about 4 months (around 3/29/2023).  Patient was given instructions and counseling regarding her condition or for health maintenance advice. Please see specific information pulled into the AVS if appropriate.

## 2023-01-10 ENCOUNTER — OFFICE VISIT (OUTPATIENT)
Dept: INTERNAL MEDICINE | Facility: CLINIC | Age: 78
End: 2023-01-10
Payer: MEDICARE

## 2023-01-10 VITALS
BODY MASS INDEX: 34.68 KG/M2 | HEART RATE: 86 BPM | TEMPERATURE: 97.5 F | WEIGHT: 165.2 LBS | DIASTOLIC BLOOD PRESSURE: 70 MMHG | HEIGHT: 58 IN | OXYGEN SATURATION: 94 % | SYSTOLIC BLOOD PRESSURE: 122 MMHG

## 2023-01-10 DIAGNOSIS — J44.0 COPD WITH LOWER RESPIRATORY INFECTION: Primary | ICD-10-CM

## 2023-01-10 DIAGNOSIS — R05.9 COUGH, UNSPECIFIED TYPE: ICD-10-CM

## 2023-01-10 LAB
EXPIRATION DATE: NORMAL
FLUAV AG UPPER RESP QL IA.RAPID: NOT DETECTED
FLUBV AG UPPER RESP QL IA.RAPID: NOT DETECTED
INTERNAL CONTROL: NORMAL
Lab: NORMAL
SARS-COV-2 AG UPPER RESP QL IA.RAPID: NOT DETECTED

## 2023-01-10 PROCEDURE — 87428 SARSCOV & INF VIR A&B AG IA: CPT | Performed by: NURSE PRACTITIONER

## 2023-01-10 PROCEDURE — 99213 OFFICE O/P EST LOW 20 MIN: CPT | Performed by: NURSE PRACTITIONER

## 2023-01-10 RX ORDER — PROMETHAZINE HYDROCHLORIDE AND CODEINE PHOSPHATE 6.25; 1 MG/5ML; MG/5ML
5 SOLUTION ORAL EVERY 4 HOURS PRN
Qty: 118 ML | Refills: 0 | Status: SHIPPED | OUTPATIENT
Start: 2023-01-10

## 2023-01-10 RX ORDER — AZITHROMYCIN 250 MG/1
TABLET, FILM COATED ORAL
Qty: 6 TABLET | Refills: 0 | Status: SHIPPED | OUTPATIENT
Start: 2023-01-10 | End: 2023-02-20

## 2023-01-10 RX ORDER — FLUOROURACIL 50 MG/G
CREAM TOPICAL
COMMUNITY
Start: 2022-11-30

## 2023-01-10 NOTE — PROGRESS NOTES
Chief Complaint  Cough (Patient has had cough, no fever. No COVID exposure. )  Subjective        History of Present Illness  Ivania Mattson is a 77 y.o. female who presents to [facility] for complaint of coughing for 1 week. Cough is productive at times and positive for PND.     Review of Systems  Constitutional: Negative for decreased appetite, fever and chills.   Lymphatic: Negative for painful or swollen nodes.   HEENT: Negative for sinus pain and earache. Positive for mild sore throat.   Cardiovascular: Negative for chest pain.  Respiratory: Negative for wheezing and dyspnea.   Gastrointestinal: Negative for nausea, vomiting and diarrhea.   Integumentary: Negative for rash.   Musculoskeletal: Negative for myalgia.   Neurologic: Negative for headaches and dizziness.     Past Medical History:   Diagnosis Date   • Acute pancreatitis    • Adenomatous polyp of stomach    • Allergy    • Alopecia    • C. difficile colitis    • Chronic lower back pain    • Depression    • Diabetes mellitus (HCC)    • Diabetes type 2, uncontrolled    • Difficulty breathing     At rest   • Edema    • Fatigue    • Heartburn    • Hiatal hernia    • History of colonoscopy     Polyp   • Hypercholesterolemia    • Insomnia    • Knee joint pain     Bilateral   • Loss of hair    • Morbid obesity (HCC)    • Numbness    • Polyphagia(783.6)    • Rash     skin folds   • Regurgitation    • Sinus drainage    • Skin cancer    • Skin rash     Beneath both breasts   • Sleep apnea    • Snoring    • Stress incontinence     Temporary urinary loss of control with cough and sneeze   • Tingling    • Wears dentures    • Wears glasses         Past Surgical History:   Procedure Laterality Date   • BLADDER SURGERY      Tack   • COLONOSCOPY N/A 3/8/2022    Procedure: COLONOSCOPY WITH POLYPECTOMY;  Surgeon: Kody Pickard MD;  Location: [facility] ENDOSCOPY;  Service: Gastroenterology;  Laterality: N/A;  COLON POLYPS,  DIVERTICULOSIS   • HYSTERECTOMY     • KNEE SURGERY Left    • LAPAROSCOPIC CHOLECYSTECTOMY     • LAPAROSCOPIC GASTRIC BANDING          Allergies   Allergen Reactions   • Morphine Shortness Of Breath          Current Outpatient Medications:   •  Accu-Chek Lyndsey Plus test strip, TEST BLOOD SUGAR THREE TIMES DAILY, Disp: 300 each, Rfl: 3  •  Accu-Chek Softclix Lancets lancets, TEST BLOOD SUGAR THREE TIMES DAILY, Disp: 300 each, Rfl: 3  •  aspirin 81 MG tablet, Take 81 mg by mouth Daily., Disp: , Rfl:   •  Blood Glucose Calibration (Accu-Chek Lyndsey) solution, , Disp: , Rfl:   •  colestipol (COLESTID) 1 g tablet, Take 1 g by mouth Every Night., Disp: , Rfl:   •  DULoxetine (CYMBALTA) 60 MG capsule, Take 1 capsule by mouth Every Night., Disp: 90 capsule, Rfl: 3  •  furosemide (LASIX) 40 MG tablet, TAKE 1 TABLET EVERY DAY, Disp: 90 tablet, Rfl: 1  •  gabapentin (NEURONTIN) 800 MG tablet, Take 1 tablet by mouth 2 (Two) Times a Day., Disp: 60 tablet, Rfl: 3  •  HYDROcodone-acetaminophen (NORCO) 5-325 MG per tablet, Take 1 tablet by mouth Every 6 (Six) Hours As Needed for Moderate Pain ., Disp: 15 tablet, Rfl: 0  •  isosorbide mononitrate (IMDUR) 30 MG 24 hr tablet, Take 1 tablet by mouth Daily., Disp: 90 tablet, Rfl: 3  •  meloxicam (MOBIC) 15 MG tablet, Take 15 mg by mouth Every Night., Disp: , Rfl:   •  metFORMIN (GLUCOPHAGE) 850 MG tablet, TAKE 1 TABLET TWICE DAILY, Disp: 180 tablet, Rfl: 1  •  metoprolol succinate XL (TOPROL-XL) 25 MG 24 hr tablet, TAKE 1 TABLET TWICE DAILY, Disp: 180 tablet, Rfl: 3  •  nitroglycerin (Nitrostat) 0.4 MG SL tablet, Place 1 tablet under the tongue Every 5 (Five) Minutes As Needed for Chest Pain. Take no more than 3 doses in 15 minutes., Disp: 30 tablet, Rfl: 12  •  potassium chloride (K-DUR,KLOR-CON) 10 MEQ CR tablet, TAKE 1 TABLET EVERY DAY, Disp: 90 tablet, Rfl: 3  •  rOPINIRole (Requip) 3 MG tablet, Take 1 tablet by mouth 3 (Three) Times a Day., Disp: 90 tablet, Rfl: 5  •  simvastatin  (ZOCOR) 40 MG tablet, TAKE 1 TABLET ONE TIME DAILY IN THE EVENING, Disp: 90 tablet, Rfl: 3  •  azithromycin (Zithromax Z-Donn) 250 MG tablet, Take 2 tablets by mouth on day 1, then 1 tablet daily on days 2-5, Disp: 6 tablet, Rfl: 0  •  fluorouracil (EFUDEX) 5 % cream, apply to face and chest EVERY DAY for 14 days, Disp: , Rfl:   •  promethazine-codeine (PHENERGAN with CODEINE) 6.25-10 MG/5ML solution, Take 5 mL by mouth Every 4 (Four) Hours As Needed for Cough., Disp: 118 mL, Rfl: 0    Objective   /70 (BP Location: Left arm, Patient Position: Sitting, Cuff Size: Large Adult)   Pulse 86   Temp 97.5 °F (36.4 °C) (Temporal)   Ht 147.3 cm (57.99\")   Wt 74.9 kg (165 lb 3.2 oz)   SpO2 94%   BMI 34.54 kg/m²    Estimated body mass index is 34.54 kg/m² as calculated from the following:    Height as of this encounter: 147.3 cm (57.99\").    Weight as of this encounter: 74.9 kg (165 lb 3.2 oz).   Physical Exam  Vitals reviewed.   Constitutional:       General: She is not in acute distress.  HENT:      Head: Normocephalic and atraumatic.   Eyes:      Conjunctiva/sclera: Conjunctivae normal.   Cardiovascular:      Rate and Rhythm: Normal rate and regular rhythm.      Heart sounds: Normal heart sounds.   Pulmonary:      Effort: Pulmonary effort is normal.      Breath sounds: Normal breath sounds. No wheezing, rhonchi or rales.   Lymphadenopathy:      Cervical: No cervical adenopathy.   Skin:     General: Skin is warm and dry.   Neurological:      General: No focal deficit present.      Mental Status: She is alert.   Psychiatric:         Thought Content: Thought content normal.        Result Review :                   Assessment and Plan    Diagnoses and all orders for this visit:    1. COPD with lower respiratory infection (HCC) (Primary)  -     promethazine-codeine (PHENERGAN with CODEINE) 6.25-10 MG/5ML solution; Take 5 mL by mouth Every 4 (Four) Hours As Needed for Cough.  Dispense: 118 mL; Refill: 0    2. Cough,  unspecified type  -     POCT SARS-CoV-2 Antigen ANNE + Flu  -     promethazine-codeine (PHENERGAN with CODEINE) 6.25-10 MG/5ML solution; Take 5 mL by mouth Every 4 (Four) Hours As Needed for Cough.  Dispense: 118 mL; Refill: 0    Other orders  -     azithromycin (Zithromax Z-Donn) 250 MG tablet; Take 2 tablets by mouth on day 1, then 1 tablet daily on days 2-5  Dispense: 6 tablet; Refill: 0    POCT: Covid and flu test are negative today. Increase fluid intake.  Eduction on diagnosis, medication and treatment plan.    Follow Up     Patient was given instructions and counseling regarding her condition. Please see specific information pulled into the AVS if appropriate.   Return if symptoms worsen or fail to improve.    JIN Ventura

## 2023-02-20 ENCOUNTER — OFFICE VISIT (OUTPATIENT)
Dept: INTERNAL MEDICINE | Facility: CLINIC | Age: 78
End: 2023-02-20
Payer: MEDICARE

## 2023-02-20 VITALS
OXYGEN SATURATION: 95 % | WEIGHT: 165.4 LBS | BODY MASS INDEX: 34.72 KG/M2 | HEIGHT: 58 IN | DIASTOLIC BLOOD PRESSURE: 79 MMHG | TEMPERATURE: 97.8 F | HEART RATE: 82 BPM | SYSTOLIC BLOOD PRESSURE: 134 MMHG

## 2023-02-20 DIAGNOSIS — R10.31 SEVERE RIGHT GROIN PAIN: ICD-10-CM

## 2023-02-20 DIAGNOSIS — M06.9 RHEUMATOID ARTHRITIS, INVOLVING UNSPECIFIED SITE, UNSPECIFIED WHETHER RHEUMATOID FACTOR PRESENT: ICD-10-CM

## 2023-02-20 DIAGNOSIS — F32.A DEPRESSION, UNSPECIFIED DEPRESSION TYPE: ICD-10-CM

## 2023-02-20 DIAGNOSIS — M54.50 ACUTE RIGHT-SIDED LOW BACK PAIN WITHOUT SCIATICA: ICD-10-CM

## 2023-02-20 DIAGNOSIS — M48.062 SPINAL STENOSIS OF LUMBAR REGION WITH NEUROGENIC CLAUDICATION: ICD-10-CM

## 2023-02-20 DIAGNOSIS — G25.81 RESTLESS LEG SYNDROME: Primary | ICD-10-CM

## 2023-02-20 DIAGNOSIS — I10 ESSENTIAL HYPERTENSION: ICD-10-CM

## 2023-02-20 DIAGNOSIS — E11.40 TYPE 2 DIABETES MELLITUS WITH DIABETIC NEUROPATHY, WITHOUT LONG-TERM CURRENT USE OF INSULIN: ICD-10-CM

## 2023-02-20 DIAGNOSIS — E55.9 VITAMIN D DEFICIENCY: ICD-10-CM

## 2023-02-20 DIAGNOSIS — E78.2 MIXED HYPERLIPIDEMIA: ICD-10-CM

## 2023-02-20 DIAGNOSIS — I50.32 CHRONIC DIASTOLIC CONGESTIVE HEART FAILURE: ICD-10-CM

## 2023-02-20 DIAGNOSIS — E66.9 OBESITY (BMI 30-39.9): ICD-10-CM

## 2023-02-20 DIAGNOSIS — M47.816 LUMBAR SPONDYLOSIS: ICD-10-CM

## 2023-02-20 PROCEDURE — 99214 OFFICE O/P EST MOD 30 MIN: CPT | Performed by: INTERNAL MEDICINE

## 2023-02-20 NOTE — PROGRESS NOTES
"CHIEF COMPLAINT/ HPI:  Groin Pain and Back Pain patient is here to also to follow-up with lab work, blood pressure diabetes back pain she says she has trouble walking and getting around,              Objective   Vital Signs  Vitals:    02/20/23 1411   BP: 134/79   Pulse: 82   Temp: 97.8 °F (36.6 °C)   SpO2: 95%   Weight: 75 kg (165 lb 6.4 oz)   Height: 147.3 cm (57.99\")      Body mass index is 34.58 kg/m².  Review of Systems   HENT: Negative.    Eyes: Negative.    Respiratory: Negative.    Cardiovascular: Negative.    Gastrointestinal: Negative.    Endocrine: Negative.    Genitourinary: Negative.    Musculoskeletal: Positive for back pain, gait problem and joint swelling.   Allergic/Immunologic: Negative.    Neurological: Positive for weakness.   Hematological: Negative.    Psychiatric/Behavioral: Negative.       Physical Exam  Constitutional:       General: She is not in acute distress.     Appearance: Normal appearance. She is obese.   HENT:      Head: Normocephalic.      Mouth/Throat:      Mouth: Mucous membranes are moist.   Eyes:      Conjunctiva/sclera: Conjunctivae normal.      Pupils: Pupils are equal, round, and reactive to light.   Cardiovascular:      Rate and Rhythm: Normal rate and regular rhythm.      Pulses: Normal pulses.      Heart sounds: Normal heart sounds.   Pulmonary:      Effort: Pulmonary effort is normal.      Breath sounds: Normal breath sounds.   Abdominal:      General: Bowel sounds are normal. There is no distension.      Palpations: Abdomen is soft. There is no mass.      Tenderness: There is no abdominal tenderness. There is no guarding or rebound.   Musculoskeletal:         General: No swelling. Normal range of motion.      Cervical back: Neck supple.      Right lower leg: Edema (Trace to 1+ edema in the lower legs and ankles bilateral,) present.      Left lower leg: Edema present.   Skin:     General: Skin is warm and dry.      Coloration: Skin is not jaundiced.   Neurological:      " General: No focal deficit present.      Mental Status: She is alert and oriented to person, place, and time. Mental status is at baseline.   Psychiatric:         Mood and Affect: Mood normal.         Behavior: Behavior normal.         Thought Content: Thought content normal.         Judgment: Judgment normal.      pt without pain on int/ ext rotation r hip, no skin changes in the low back area no focal pain on the right lower back, abdomen is obese soft, no masses felt right lower quadrant  Result Review :   Lab Results   Component Value Date    PROBNP 90.1 05/25/2022    PROBNP 44.6 10/11/2021    BNP 80 05/20/2019     05/16/2019    BNP 4275 (H) 05/12/2019     CMP    CMP 5/25/22 8/27/22 11/28/22   Glucose 102 (A) 106 (A) 95   BUN 10 14 9   Creatinine 0.99 0.88 0.84   eGFR 58.5 (A) 67.4 70.8   Sodium 142 144 140   Potassium 4.3 4.3 4.4   Chloride 103 105 102   Calcium 9.6 9.0 9.8   Total Protein 7.1 6.4 7.0   Albumin 4.00 4.00 4.30   Globulin 3.1 2.4 2.7   Total Bilirubin 0.4 0.2 0.2   Alkaline Phosphatase 62 58 65   AST (SGOT) 27 36 (A) 35 (A)   ALT (SGPT) 11 18 21   Albumin/Globulin Ratio 1.3 1.7 1.6   BUN/Creatinine Ratio 10.1 15.9 10.7   Anion Gap 12.4 11.0 11.0   (A) Abnormal value       Comments are available for some flowsheets but are not being displayed.           CBC w/diff    CBC w/Diff 5/25/22 8/27/22 11/28/22   WBC 6.10 7.71 5.04   RBC 4.70 4.22 4.68   Hemoglobin 14.0 12.7 13.5   Hematocrit 41.3 38.4 41.2   MCV 87.9 91.0 88.0   MCH 29.8 30.1 28.8   MCHC 33.9 33.1 32.8   RDW 14.1 14.5 13.5   Platelets 231 222 252   Neutrophil Rel % 62.9 67.7 46.0   Immature Granulocyte Rel % 0.5 0.6 (A) 0.6 (A)   Lymphocyte Rel % 26.6 23.3 41.5   Monocyte Rel % 8.9 7.1 9.7   Eosinophil Rel % 0.8 1.0 2.0   Basophil Rel % 0.3 0.3 0.2   (A) Abnormal value             Lipid Panel    Lipid Panel 5/25/22   Total Cholesterol 110   Triglycerides 121   HDL Cholesterol 32 (A)   VLDL Cholesterol 22   LDL Cholesterol  56    LDL/HDL Ratio 1.68   (A) Abnormal value             Lab Results   Component Value Date    TSH 2.030 05/25/2022    TSH 1.650 10/11/2021    TSH 1.490 05/10/2019      Lab Results   Component Value Date    FREET4 1.08 05/25/2022    FREET4 1.4 05/10/2019      A1C Last 3 Results    HGBA1C Last 3 Results 5/25/22 11/28/22   Hemoglobin A1C 6.20 (A) 5.60   (A) Abnormal value                              Visit Diagnoses:    ICD-10-CM ICD-9-CM   1. Restless leg syndrome  G25.81 333.94   2. Spinal stenosis of lumbar region with neurogenic claudication  M48.062 724.03   3. Type 2 diabetes mellitus with diabetic neuropathy, without long-term current use of insulin ([facility])  E11.40 250.60     357.2   4. Lumbar spondylosis  M47.816 721.3   5. Essential hypertension  I10 401.9   6. Vitamin D deficiency  E55.9 268.9   7. Chronic diastolic congestive heart failure ([facility])  I50.32 428.32     428.0   8. Obesity (BMI 30-39.9)  E66.9 278.00   9. Depression, unspecified depression type  F32.A 311   10. Mixed hyperlipidemia  E78.2 272.2   11. Rheumatoid arthritis, involving unspecified site, unspecified whether rheumatoid factor present ([facility])  M06.9 714.0   12. Severe right groin pain  R10.31 789.03   13. Acute right-sided low back pain without sciatica  M54.50 724.2       Assessment and Plan   Diagnoses and all orders for this visit:    1. Restless leg syndrome (Primary)  -     XR Hip With or Without Pelvis 2 - 3 View Right; Future  -     XR Spine Lumbar 2 or 3 View; Future  -     CT Abdomen Pelvis With Contrast; Future    2. Spinal stenosis of lumbar region with neurogenic claudication  -     XR Hip With or Without Pelvis 2 - 3 View Right; Future  -     XR Spine Lumbar 2 or 3 View; Future  -     CT Abdomen Pelvis With Contrast; Future    3. Type 2 diabetes mellitus with diabetic neuropathy, without long-term current use of insulin (HCC)  -     XR Hip With or Without Pelvis 2 - 3 View Right; Future  -     XR Spine Lumbar 2 or 3 View; Future  -      CT Abdomen Pelvis With Contrast; Future    4. Lumbar spondylosis  -     XR Hip With or Without Pelvis 2 - 3 View Right; Future  -     XR Spine Lumbar 2 or 3 View; Future  -     CT Abdomen Pelvis With Contrast; Future    5. Essential hypertension  -     XR Hip With or Without Pelvis 2 - 3 View Right; Future  -     XR Spine Lumbar 2 or 3 View; Future  -     CT Abdomen Pelvis With Contrast; Future    6. Vitamin D deficiency  -     XR Hip With or Without Pelvis 2 - 3 View Right; Future  -     XR Spine Lumbar 2 or 3 View; Future  -     CT Abdomen Pelvis With Contrast; Future    7. Chronic diastolic congestive heart failure (HCC)  -     XR Hip With or Without Pelvis 2 - 3 View Right; Future  -     XR Spine Lumbar 2 or 3 View; Future  -     CT Abdomen Pelvis With Contrast; Future    8. Obesity (BMI 30-39.9)  -     XR Hip With or Without Pelvis 2 - 3 View Right; Future  -     XR Spine Lumbar 2 or 3 View; Future  -     CT Abdomen Pelvis With Contrast; Future    9. Depression, unspecified depression type  -     XR Hip With or Without Pelvis 2 - 3 View Right; Future  -     XR Spine Lumbar 2 or 3 View; Future  -     CT Abdomen Pelvis With Contrast; Future    10. Mixed hyperlipidemia  -     XR Hip With or Without Pelvis 2 - 3 View Right; Future  -     XR Spine Lumbar 2 or 3 View; Future  -     CT Abdomen Pelvis With Contrast; Future    11. Rheumatoid arthritis, involving unspecified site, unspecified whether rheumatoid factor present (HCC)  -     XR Hip With or Without Pelvis 2 - 3 View Right; Future  -     XR Spine Lumbar 2 or 3 View; Future  -     CT Abdomen Pelvis With Contrast; Future    12. Severe right groin pain  -     XR Hip With or Without Pelvis 2 - 3 View Right; Future  -     XR Spine Lumbar 2 or 3 View; Future  -     CT Abdomen Pelvis With Contrast; Future    13. Acute right-sided low back pain without sciatica  -     XR Hip With or Without Pelvis 2 - 3 View Right; Future  -     XR Spine Lumbar 2 or 3 View;  Future  -     CT Abdomen Pelvis With Contrast; Future    Right back pain and right groin pain, rather acute new onset,, comes and goes, etiology unclear rule out osteoarthritis hip fracture lumbar spinal stenosis or musculoskeletal cannot rule out lower abdominal intra-abdominal pathology discussed February 20, 2023 because the pain is also in the right abdominal area right lower quadrant we will get a do a CT scan of the abdomen and pelvis          Chest pains, coming and going over the past couple months getting worse, intense pains center of the chest, patient needs cardiology evaluation as soon as possible, possible stress test or cardiac cath, patient will continue aspirin therapy,, statin therapy, beta-blockers metoprolol,---  Dr. Walker ---I discussed case with dr romero and will be seen soon ,-, cardiac stress test August 23, 2022, ---showed normal left ventricle size ejection fraction 70% no wall motion abnormalities low risk myocardial perfusion study, with Lexiscan, echocardiogram September 9, 2022 showed normal ejection fraction 68% posterior mitral valve leaflet thickening chordal Gavino was noted, diastolic dysfunction with mild septal asymmetric hypertrophy, chest x-ray no active disease August 27, 2022    Type 2 diabetes hemoglobin A1c  Down to 5.6 nov 2022-----continues metformin 850 MG QD     Mild elevation AST 35 November 28, 2022, stable    Frequent loose stools, incontinence at times, discussed changing Metformin to extended release, continues on colestipol since she has had her gallbladder removed,    Peripheral arterial disease left foot pain arterial evaluation March 15, 2022 shows EDEL left and right to be normal,---    Lumbar spinal stenosis-------------- previous work-up with [facility] [place] December 2021, was referred to pain management for trigger point injections    Previous left total knee arthroplasty, laparoscopic cholecystectomy March 2016, previous colonoscopies  polypectomies, right breast biopsy, right hip surgery, lap band surgery, ADRIANO, recent back surgery July 2018,    Chronic pain, continue Cymbalta,    Depression anxiety, continues on Cymbalta 60 mg daily    Peripheral neuropathy lower legs,---CONT GABAPENTIN 600 MG TID     Previous pelvic fractures    Diverticulosis and diverticulitis in September 2015    C. difficile colitis diagnosed February 2016 treated resolved    History of small bowel enteritis small bowel perforation resolved 2016    Kidney stones    Alcohol use in the past,    Previous ERCP Common bile duct stone cholelithiasis choledocholithiasis with stent placement and sphincterotomy February 29, 2016, status post laparoscopic cholecystectomy Dr. Huizar    History of urinary tract infection    Paroxysmal atrial fibrillation May 2019 with RVR, clinically in sinus rhythm    Hypertension, continues metoprolol XL 25 mg daily,    Obstructive sleep apnea, patient needs a new CPAP machine she has been diagnosed with obstructive sleep apnea and needs this machine in order to avoid hypoxic respiratory issues at night, she has significant apnea hypopnea and require CPAP for treatment, she is compliant with the CPAP device,    Chronic lower extremity edema, continues on Lasix 40 mg daily, potassium supplements,, BR NP levels are extremely low at 90-- May 25, 2022, recommend compression devices rather than more diuretics given the kidney numbers and heart numbers being good, November 2022    Elevated cholesterol continues on simvastatin 40 mg daily          Follow Up   Return in about 3 weeks (around 3/13/2023).  Patient was given instructions and counseling regarding her condition or for health maintenance advice. Please see specific information pulled into the AVS if appropriate.

## 2023-03-17 ENCOUNTER — HOSPITAL ENCOUNTER (OUTPATIENT)
Dept: GENERAL RADIOLOGY | Facility: HOSPITAL | Age: 78
Discharge: HOME | End: 2023-03-17
Payer: MEDICARE

## 2023-03-17 DIAGNOSIS — G25.81 RESTLESS LEG SYNDROME: ICD-10-CM

## 2023-03-17 DIAGNOSIS — I50.32 CHRONIC DIASTOLIC CONGESTIVE HEART FAILURE: ICD-10-CM

## 2023-03-17 DIAGNOSIS — M47.816 LUMBAR SPONDYLOSIS: ICD-10-CM

## 2023-03-17 DIAGNOSIS — F32.A DEPRESSION, UNSPECIFIED DEPRESSION TYPE: ICD-10-CM

## 2023-03-17 DIAGNOSIS — M06.9 RHEUMATOID ARTHRITIS, INVOLVING UNSPECIFIED SITE, UNSPECIFIED WHETHER RHEUMATOID FACTOR PRESENT: ICD-10-CM

## 2023-03-17 DIAGNOSIS — M54.50 ACUTE RIGHT-SIDED LOW BACK PAIN WITHOUT SCIATICA: ICD-10-CM

## 2023-03-17 DIAGNOSIS — E78.2 MIXED HYPERLIPIDEMIA: ICD-10-CM

## 2023-03-17 DIAGNOSIS — I10 ESSENTIAL HYPERTENSION: ICD-10-CM

## 2023-03-17 DIAGNOSIS — R10.31 SEVERE RIGHT GROIN PAIN: ICD-10-CM

## 2023-03-17 DIAGNOSIS — M48.062 SPINAL STENOSIS OF LUMBAR REGION WITH NEUROGENIC CLAUDICATION: ICD-10-CM

## 2023-03-17 DIAGNOSIS — E66.9 OBESITY (BMI 30-39.9): ICD-10-CM

## 2023-03-17 DIAGNOSIS — E55.9 VITAMIN D DEFICIENCY: ICD-10-CM

## 2023-03-17 DIAGNOSIS — E11.40 TYPE 2 DIABETES MELLITUS WITH DIABETIC NEUROPATHY, WITHOUT LONG-TERM CURRENT USE OF INSULIN: ICD-10-CM

## 2023-03-17 PROCEDURE — 72100 X-RAY EXAM L-S SPINE 2/3 VWS: CPT

## 2023-03-17 PROCEDURE — 73502 X-RAY EXAM HIP UNI 2-3 VIEWS: CPT

## 2023-03-19 ENCOUNTER — TELEPHONE (OUTPATIENT)
Dept: INTERNAL MEDICINE | Facility: CLINIC | Age: 78
End: 2023-03-19
Payer: MEDICARE

## 2023-03-19 NOTE — TELEPHONE ENCOUNTER
Call patient tell her there is no fracture in her hip, and very little arthritis, there is some postoperative changes in her back and pelvic area.  The back x-ray is stable compared to 1 from 2 years ago, there is no fractures, there are some kidney stones up in the kidney on the right side but otherwise unremarkable

## 2023-03-21 ENCOUNTER — LAB (OUTPATIENT)
Dept: LAB | Facility: HOSPITAL | Age: 78
End: 2023-03-21
Payer: MEDICARE

## 2023-03-21 DIAGNOSIS — G47.33 OBSTRUCTIVE APNEA: ICD-10-CM

## 2023-03-21 DIAGNOSIS — M48.062 SPINAL STENOSIS OF LUMBAR REGION WITH NEUROGENIC CLAUDICATION: ICD-10-CM

## 2023-03-21 DIAGNOSIS — E78.2 MIXED HYPERLIPIDEMIA: ICD-10-CM

## 2023-03-21 DIAGNOSIS — I50.32 CHRONIC DIASTOLIC CONGESTIVE HEART FAILURE: ICD-10-CM

## 2023-03-21 DIAGNOSIS — M06.9 RHEUMATOID ARTHRITIS, INVOLVING UNSPECIFIED SITE, UNSPECIFIED WHETHER RHEUMATOID FACTOR PRESENT: ICD-10-CM

## 2023-03-21 DIAGNOSIS — I10 ESSENTIAL HYPERTENSION: ICD-10-CM

## 2023-03-21 DIAGNOSIS — E55.9 VITAMIN D DEFICIENCY: ICD-10-CM

## 2023-03-21 DIAGNOSIS — F32.A DEPRESSION, UNSPECIFIED DEPRESSION TYPE: ICD-10-CM

## 2023-03-21 DIAGNOSIS — G25.81 RESTLESS LEG SYNDROME: ICD-10-CM

## 2023-03-21 DIAGNOSIS — E66.9 OBESITY (BMI 30-39.9): ICD-10-CM

## 2023-03-21 DIAGNOSIS — M79.89 LIMB SWELLING: ICD-10-CM

## 2023-03-21 DIAGNOSIS — E11.9 DIABETES MELLITUS WITH HEMOGLOBIN A1C GOAL OF 7.0%-8.0%: ICD-10-CM

## 2023-03-21 LAB
ALBUMIN SERPL-MCNC: 4.3 G/DL (ref 3.5–5.2)
ALBUMIN/GLOB SERPL: 1.4 G/DL
ALP SERPL-CCNC: 65 U/L (ref 39–117)
ALT SERPL W P-5'-P-CCNC: 12 U/L (ref 1–33)
ANION GAP SERPL CALCULATED.3IONS-SCNC: 9 MMOL/L (ref 5–15)
AST SERPL-CCNC: 33 U/L (ref 1–32)
BASOPHILS # BLD AUTO: 0.01 10*3/MM3 (ref 0–0.2)
BASOPHILS NFR BLD AUTO: 0.2 % (ref 0–1.5)
BILIRUB SERPL-MCNC: 0.3 MG/DL (ref 0–1.2)
BUN SERPL-MCNC: 8 MG/DL (ref 8–23)
BUN/CREAT SERPL: 10.5 (ref 7–25)
CALCIUM SPEC-SCNC: 9.2 MG/DL (ref 8.6–10.5)
CHLORIDE SERPL-SCNC: 103 MMOL/L (ref 98–107)
CHOLEST SERPL-MCNC: 136 MG/DL (ref 0–200)
CO2 SERPL-SCNC: 28 MMOL/L (ref 22–29)
CREAT SERPL-MCNC: 0.76 MG/DL (ref 0.57–1)
DEPRECATED RDW RBC AUTO: 45.4 FL (ref 37–54)
EGFRCR SERPLBLD CKD-EPI 2021: 79.8 ML/MIN/1.73
EOSINOPHIL # BLD AUTO: 0.09 10*3/MM3 (ref 0–0.4)
EOSINOPHIL NFR BLD AUTO: 1.6 % (ref 0.3–6.2)
ERYTHROCYTE [DISTWIDTH] IN BLOOD BY AUTOMATED COUNT: 15 % (ref 12.3–15.4)
GLOBULIN UR ELPH-MCNC: 3.1 GM/DL
GLUCOSE SERPL-MCNC: 76 MG/DL (ref 65–99)
HBA1C MFR BLD: 5.8 % (ref 4.8–5.6)
HCT VFR BLD AUTO: 41 % (ref 34–46.6)
HDLC SERPL-MCNC: 47 MG/DL (ref 40–60)
HGB BLD-MCNC: 13.4 G/DL (ref 12–15.9)
IMM GRANULOCYTES # BLD AUTO: 0.02 10*3/MM3 (ref 0–0.05)
IMM GRANULOCYTES NFR BLD AUTO: 0.4 % (ref 0–0.5)
LDLC SERPL CALC-MCNC: 70 MG/DL (ref 0–100)
LDLC/HDLC SERPL: 1.46 {RATIO}
LYMPHOCYTES # BLD AUTO: 1.76 10*3/MM3 (ref 0.7–3.1)
LYMPHOCYTES NFR BLD AUTO: 30.9 % (ref 19.6–45.3)
MCH RBC QN AUTO: 27.6 PG (ref 26.6–33)
MCHC RBC AUTO-ENTMCNC: 32.7 G/DL (ref 31.5–35.7)
MCV RBC AUTO: 84.5 FL (ref 79–97)
MONOCYTES # BLD AUTO: 0.47 10*3/MM3 (ref 0.1–0.9)
MONOCYTES NFR BLD AUTO: 8.3 % (ref 5–12)
NEUTROPHILS NFR BLD AUTO: 3.34 10*3/MM3 (ref 1.7–7)
NEUTROPHILS NFR BLD AUTO: 58.6 % (ref 42.7–76)
NRBC BLD AUTO-RTO: 0 /100 WBC (ref 0–0.2)
NT-PROBNP SERPL-MCNC: 89.7 PG/ML (ref 0–1800)
PLATELET # BLD AUTO: 221 10*3/MM3 (ref 140–450)
PMV BLD AUTO: 9.6 FL (ref 6–12)
POTASSIUM SERPL-SCNC: 4.7 MMOL/L (ref 3.5–5.2)
PROT SERPL-MCNC: 7.4 G/DL (ref 6–8.5)
RBC # BLD AUTO: 4.85 10*6/MM3 (ref 3.77–5.28)
SODIUM SERPL-SCNC: 140 MMOL/L (ref 136–145)
TRIGL SERPL-MCNC: 101 MG/DL (ref 0–150)
VLDLC SERPL-MCNC: 19 MG/DL (ref 5–40)
WBC NRBC COR # BLD: 5.69 10*3/MM3 (ref 3.4–10.8)

## 2023-03-21 PROCEDURE — 80061 LIPID PANEL: CPT

## 2023-03-21 PROCEDURE — 85025 COMPLETE CBC W/AUTO DIFF WBC: CPT

## 2023-03-21 PROCEDURE — 83880 ASSAY OF NATRIURETIC PEPTIDE: CPT

## 2023-03-21 PROCEDURE — 36415 COLL VENOUS BLD VENIPUNCTURE: CPT

## 2023-03-21 PROCEDURE — 80053 COMPREHEN METABOLIC PANEL: CPT

## 2023-03-21 PROCEDURE — 83036 HEMOGLOBIN GLYCOSYLATED A1C: CPT

## 2023-03-22 ENCOUNTER — HOSPITAL ENCOUNTER (OUTPATIENT)
Dept: CT IMAGING | Facility: HOSPITAL | Age: 78
Discharge: HOME | End: 2023-03-22
Admitting: INTERNAL MEDICINE
Payer: MEDICARE

## 2023-03-22 DIAGNOSIS — R10.31 SEVERE RIGHT GROIN PAIN: ICD-10-CM

## 2023-03-22 DIAGNOSIS — I50.32 CHRONIC DIASTOLIC CONGESTIVE HEART FAILURE: ICD-10-CM

## 2023-03-22 DIAGNOSIS — M48.062 SPINAL STENOSIS OF LUMBAR REGION WITH NEUROGENIC CLAUDICATION: ICD-10-CM

## 2023-03-22 DIAGNOSIS — M47.816 LUMBAR SPONDYLOSIS: ICD-10-CM

## 2023-03-22 DIAGNOSIS — G25.81 RESTLESS LEG SYNDROME: ICD-10-CM

## 2023-03-22 DIAGNOSIS — F32.A DEPRESSION, UNSPECIFIED DEPRESSION TYPE: ICD-10-CM

## 2023-03-22 DIAGNOSIS — E78.2 MIXED HYPERLIPIDEMIA: ICD-10-CM

## 2023-03-22 DIAGNOSIS — I10 ESSENTIAL HYPERTENSION: ICD-10-CM

## 2023-03-22 DIAGNOSIS — E11.40 TYPE 2 DIABETES MELLITUS WITH DIABETIC NEUROPATHY, WITHOUT LONG-TERM CURRENT USE OF INSULIN: ICD-10-CM

## 2023-03-22 DIAGNOSIS — E66.9 OBESITY (BMI 30-39.9): ICD-10-CM

## 2023-03-22 DIAGNOSIS — M06.9 RHEUMATOID ARTHRITIS, INVOLVING UNSPECIFIED SITE, UNSPECIFIED WHETHER RHEUMATOID FACTOR PRESENT: ICD-10-CM

## 2023-03-22 DIAGNOSIS — E55.9 VITAMIN D DEFICIENCY: ICD-10-CM

## 2023-03-22 DIAGNOSIS — M54.50 ACUTE RIGHT-SIDED LOW BACK PAIN WITHOUT SCIATICA: ICD-10-CM

## 2023-03-22 PROCEDURE — 74178 CT ABD&PLV WO CNTR FLWD CNTR: CPT

## 2023-03-22 PROCEDURE — 25510000001 IOPAMIDOL PER 1 ML: Performed by: INTERNAL MEDICINE

## 2023-03-22 RX ADMIN — IOPAMIDOL 100 ML: 755 INJECTION, SOLUTION INTRAVENOUS at 14:58

## 2023-03-23 ENCOUNTER — TELEPHONE (OUTPATIENT)
Dept: INTERNAL MEDICINE | Facility: CLINIC | Age: 78
End: 2023-03-23

## 2023-03-23 ENCOUNTER — OFFICE VISIT (OUTPATIENT)
Dept: INTERNAL MEDICINE | Facility: CLINIC | Age: 78
End: 2023-03-23
Payer: MEDICARE

## 2023-03-23 VITALS
DIASTOLIC BLOOD PRESSURE: 82 MMHG | SYSTOLIC BLOOD PRESSURE: 128 MMHG | HEIGHT: 58 IN | BODY MASS INDEX: 35.05 KG/M2 | WEIGHT: 167 LBS | HEART RATE: 95 BPM | OXYGEN SATURATION: 94 % | TEMPERATURE: 98 F

## 2023-03-23 DIAGNOSIS — F32.A DEPRESSION, UNSPECIFIED DEPRESSION TYPE: ICD-10-CM

## 2023-03-23 DIAGNOSIS — G25.81 RESTLESS LEG SYNDROME: Primary | ICD-10-CM

## 2023-03-23 DIAGNOSIS — E78.2 MIXED HYPERLIPIDEMIA: ICD-10-CM

## 2023-03-23 DIAGNOSIS — E11.40 TYPE 2 DIABETES MELLITUS WITH DIABETIC NEUROPATHY, WITHOUT LONG-TERM CURRENT USE OF INSULIN: ICD-10-CM

## 2023-03-23 DIAGNOSIS — J44.9 CHRONIC OBSTRUCTIVE PULMONARY DISEASE, UNSPECIFIED COPD TYPE: ICD-10-CM

## 2023-03-23 DIAGNOSIS — M48.062 SPINAL STENOSIS OF LUMBAR REGION WITH NEUROGENIC CLAUDICATION: ICD-10-CM

## 2023-03-23 DIAGNOSIS — M79.89 LIMB SWELLING: ICD-10-CM

## 2023-03-23 DIAGNOSIS — I10 ESSENTIAL HYPERTENSION: ICD-10-CM

## 2023-03-23 DIAGNOSIS — M54.30 SCIATIC LEG PAIN: ICD-10-CM

## 2023-03-23 DIAGNOSIS — I50.32 CHRONIC DIASTOLIC CONGESTIVE HEART FAILURE: ICD-10-CM

## 2023-03-23 DIAGNOSIS — R53.83 OTHER FATIGUE: ICD-10-CM

## 2023-03-23 DIAGNOSIS — E55.9 VITAMIN D DEFICIENCY: ICD-10-CM

## 2023-03-23 DIAGNOSIS — E11.9 DIABETES MELLITUS WITH HEMOGLOBIN A1C GOAL OF 7.0%-8.0%: ICD-10-CM

## 2023-03-23 RX ORDER — HYDROCODONE BITARTRATE AND ACETAMINOPHEN 5; 325 MG/1; MG/1
1 TABLET ORAL EVERY 6 HOURS PRN
Qty: 60 TABLET | Refills: 0 | Status: SHIPPED | OUTPATIENT
Start: 2023-03-23

## 2023-03-23 NOTE — TELEPHONE ENCOUNTER
Caller: DAMION    Relationship to patient:  [employer]    Best call back number: [phone]    Patient is needing: DAMION IS  CALLING TO INQUIRE ABOUT THE BLOOD GLUCOSE METER AND IS WONDERING IF THE FAX HAS BEEN RECEIVED. SHE IS ALSO NEEDING A CURRENT CHART NOTE STATING THE DIAGNOSIS OF DIABETES AND THE TREATMENT PLAN.

## 2023-03-23 NOTE — PROGRESS NOTES
"CHIEF COMPLAINT/ HPI:  Hyperlipidemia and Follow-up (Patient is here for a routine follow up )    Here to follow-up with back pain, restless legs blood pressure and diabetes says she is still frustrated with her back pain is never gone away completely she still has it, its worse than it has been she is done all the modalities including pain management injections therapy without significant improvement          Objective   Vital Signs  Vitals:    03/23/23 1359   BP: 128/82   Pulse: 95   Temp: 98 °F (36.7 °C)   SpO2: 94%   Weight: 75.8 kg (167 lb)   Height: 147.3 cm (57.99\")      Body mass index is 34.91 kg/m².  Review of Systems   Constitutional: Negative.    HENT: Negative.    Eyes: Negative.    Respiratory: Negative.    Cardiovascular: Negative.    Gastrointestinal: Negative.    Endocrine: Negative.    Genitourinary: Negative.    Musculoskeletal: Positive for arthralgias, back pain, gait problem and myalgias.   Allergic/Immunologic: Negative.    Hematological: Negative.    Psychiatric/Behavioral: Negative.       Physical Exam  Constitutional:       General: She is not in acute distress.     Appearance: Normal appearance. She is obese.   HENT:      Head: Normocephalic.      Mouth/Throat:      Mouth: Mucous membranes are moist.   Eyes:      Conjunctiva/sclera: Conjunctivae normal.      Pupils: Pupils are equal, round, and reactive to light.   Cardiovascular:      Rate and Rhythm: Normal rate and regular rhythm.      Pulses: Normal pulses.      Heart sounds: Normal heart sounds.   Pulmonary:      Effort: Pulmonary effort is normal.      Breath sounds: Normal breath sounds.   Abdominal:      General: Bowel sounds are normal.      Palpations: Abdomen is soft.   Musculoskeletal:         General: No swelling. Normal range of motion.      Cervical back: Neck supple.      Right lower leg: Edema present.      Left lower leg: Edema present.   Skin:     General: Skin is warm and dry.      Coloration: Skin is not jaundiced. "   Neurological:      General: No focal deficit present.      Mental Status: She is alert and oriented to person, place, and time. Mental status is at baseline.   Psychiatric:         Mood and Affect: Mood normal.         Behavior: Behavior normal.         Thought Content: Thought content normal.         Judgment: Judgment normal.        Result Review :   Lab Results   Component Value Date    PROBNP 89.7 03/21/2023    PROBNP 90.1 05/25/2022    PROBNP 44.6 10/11/2021    BNP 80 05/20/2019     05/16/2019    BNP 4275 (H) 05/12/2019     CMP    CMP 8/27/22 11/28/22 3/21/23   Glucose 106 (A) 95 76   BUN 14 9 8   Creatinine 0.88 0.84 0.76   eGFR 67.4 70.8 79.8   Sodium 144 140 140   Potassium 4.3 4.4 4.7   Chloride 105 102 103   Calcium 9.0 9.8 9.2   Total Protein 6.4 7.0 7.4   Albumin 4.00 4.30 4.3   Globulin 2.4 2.7 3.1   Total Bilirubin 0.2 0.2 0.3   Alkaline Phosphatase 58 65 65   AST (SGOT) 36 (A) 35 (A) 33 (A)   ALT (SGPT) 18 21 12   Albumin/Globulin Ratio 1.7 1.6 1.4   BUN/Creatinine Ratio 15.9 10.7 10.5   Anion Gap 11.0 11.0 9.0   (A) Abnormal value       Comments are available for some flowsheets but are not being displayed.           CBC w/diff    CBC w/Diff 8/27/22 11/28/22 3/21/23   WBC 7.71 5.04 5.69   RBC 4.22 4.68 4.85   Hemoglobin 12.7 13.5 13.4   Hematocrit 38.4 41.2 41.0   MCV 91.0 88.0 84.5   MCH 30.1 28.8 27.6   MCHC 33.1 32.8 32.7   RDW 14.5 13.5 15.0   Platelets 222 252 221   Neutrophil Rel % 67.7 46.0 58.6   Immature Granulocyte Rel % 0.6 (A) 0.6 (A) 0.4   Lymphocyte Rel % 23.3 41.5 30.9   Monocyte Rel % 7.1 9.7 8.3   Eosinophil Rel % 1.0 2.0 1.6   Basophil Rel % 0.3 0.2 0.2   (A) Abnormal value             Lipid Panel    Lipid Panel 5/25/22 3/21/23   Total Cholesterol 110 136   Triglycerides 121 101   HDL Cholesterol 32 (A) 47   VLDL Cholesterol 22 19   LDL Cholesterol  56 70   LDL/HDL Ratio 1.68 1.46   (A) Abnormal value             Lab Results   Component Value Date    TSH 2.030 05/25/2022     TSH 1.650 10/11/2021    TSH 1.490 05/10/2019      Lab Results   Component Value Date    FREET4 1.08 05/25/2022    FREET4 1.4 05/10/2019      A1C Last 3 Results    HGBA1C Last 3 Results 5/25/22 11/28/22 3/21/23   Hemoglobin A1C 6.20 (A) 5.60 5.80 (A)   (A) Abnormal value                              Visit Diagnoses:    ICD-10-CM ICD-9-CM   1. Restless leg syndrome  G25.81 333.94   2. Spinal stenosis of lumbar region with neurogenic claudication  M48.062 724.03   3. Limb swelling  M79.89 729.81   4. Diabetes mellitus with hemoglobin A1c goal of 7.0%-8.0% ([facility])  E11.9 250.00   5. Sciatic leg pain  M54.30 724.3   6. Mixed hyperlipidemia  E78.2 272.2   7. Depression, unspecified depression type  F32.A 311   8. Chronic diastolic congestive heart failure ([facility])  I50.32 428.32     428.0   9. Essential hypertension  I10 401.9   10. Type 2 diabetes mellitus with diabetic neuropathy, without long-term current use of insulin ([facility])  E11.40 250.60     357.2   11. Vitamin D deficiency  E55.9 268.9   12. Other fatigue  R53.83 780.79   13. Chronic obstructive pulmonary disease, unspecified COPD type ([facility])  J44.9 496       Assessment and Plan   Diagnoses and all orders for this visit:    1. Restless leg syndrome (Primary)  -     HYDROcodone-acetaminophen (Norco) 5-325 MG per tablet; Take 1 tablet by mouth Every 6 (Six) Hours As Needed for Moderate Pain.  Dispense: 60 tablet; Refill: 0    2. Spinal stenosis of lumbar region with neurogenic claudication  -     HYDROcodone-acetaminophen (Norco) 5-325 MG per tablet; Take 1 tablet by mouth Every 6 (Six) Hours As Needed for Moderate Pain.  Dispense: 60 tablet; Refill: 0    3. Limb swelling  -     HYDROcodone-acetaminophen (Norco) 5-325 MG per tablet; Take 1 tablet by mouth Every 6 (Six) Hours As Needed for Moderate Pain.  Dispense: 60 tablet; Refill: 0    4. Diabetes mellitus with hemoglobin A1c goal of 7.0%-8.0% ([facility])  -     HYDROcodone-acetaminophen (Norco) 5-325 MG per tablet; Take 1  tablet by mouth Every 6 (Six) Hours As Needed for Moderate Pain.  Dispense: 60 tablet; Refill: 0    5. Sciatic leg pain  -     HYDROcodone-acetaminophen (Norco) 5-325 MG per tablet; Take 1 tablet by mouth Every 6 (Six) Hours As Needed for Moderate Pain.  Dispense: 60 tablet; Refill: 0    6. Mixed hyperlipidemia  -     HYDROcodone-acetaminophen (Norco) 5-325 MG per tablet; Take 1 tablet by mouth Every 6 (Six) Hours As Needed for Moderate Pain.  Dispense: 60 tablet; Refill: 0    7. Depression, unspecified depression type  -     HYDROcodone-acetaminophen (Norco) 5-325 MG per tablet; Take 1 tablet by mouth Every 6 (Six) Hours As Needed for Moderate Pain.  Dispense: 60 tablet; Refill: 0    8. Chronic diastolic congestive heart failure (HCC)  -     HYDROcodone-acetaminophen (Norco) 5-325 MG per tablet; Take 1 tablet by mouth Every 6 (Six) Hours As Needed for Moderate Pain.  Dispense: 60 tablet; Refill: 0    9. Essential hypertension  -     HYDROcodone-acetaminophen (Norco) 5-325 MG per tablet; Take 1 tablet by mouth Every 6 (Six) Hours As Needed for Moderate Pain.  Dispense: 60 tablet; Refill: 0    10. Type 2 diabetes mellitus with diabetic neuropathy, without long-term current use of insulin (HCC)  -     HYDROcodone-acetaminophen (Norco) 5-325 MG per tablet; Take 1 tablet by mouth Every 6 (Six) Hours As Needed for Moderate Pain.  Dispense: 60 tablet; Refill: 0    11. Vitamin D deficiency  -     HYDROcodone-acetaminophen (Norco) 5-325 MG per tablet; Take 1 tablet by mouth Every 6 (Six) Hours As Needed for Moderate Pain.  Dispense: 60 tablet; Refill: 0    12. Other fatigue  -     HYDROcodone-acetaminophen (Norco) 5-325 MG per tablet; Take 1 tablet by mouth Every 6 (Six) Hours As Needed for Moderate Pain.  Dispense: 60 tablet; Refill: 0    13. Chronic obstructive pulmonary disease, unspecified COPD type (HCC)  -     HYDROcodone-acetaminophen (Norco) 5-325 MG per tablet; Take 1 tablet by mouth Every 6 (Six) Hours As Needed  for Moderate Pain.  Dispense: 60 tablet; Refill: 0             Right back pain and right groin pain, rather acute new onset,, comes and goes, etiology unclear rule out osteoarthritis hip fracture lumbar spinal stenosis or musculoskeletal cannot rule out lower abdominal intra-abdominal pathology discussed February 20, 2023 because the pain is also in the right abdominal area right lower quadrant ------ x-ray of her lumbar spine March 18, 2023 shows no fracture or malalignment extensive postoperative changes, stable radiographic appearance of the lumbar spine since August 2021,------ x-ray of the hip pelvis shows no acute fracture or malalignment, ------CT scan abdomen and pelvis showed a 2 cm stone in the right renal pelvis that is enlarged over the past 2 to 3 years mild right hydronephrosis moderate sigmoid diverticulosis without evidence of infection extensive surgical lumbar fusion previous cholecystectomy previous lap band and hysterectomy    Skin lesion on the right nose consistent with possible early basal cell has appointment with dermatology soon discussed March 2023    Chest pains,  cardiac stress test August 23, 2022, ---showed normal left ventricle size ejection fraction 70% no wall motion abnormalities low risk myocardial perfusion study, with Lexiscan, echocardiogram September 9, 2022 showed normal ejection fraction 68% posterior mitral valve leaflet thickening chordal Gavino was noted, diastolic dysfunction with mild septal asymmetric hypertrophy, chest x-ray no active disease August 27, 2022    Type 2 diabetes hemoglobin A1c 5.8 march 2023 -----continues metformin 850 MG QD     Mild elevation AST 35 November 28, 2022, stable    Peripheral arterial disease left foot pain arterial evaluation March 15, 2022 shows EDEL left and right to be normal,---    Lumbar spinal stenosis-------------- previous work-up with [facility] spine [place] December 2021, was referred to pain management for trigger point  injections    Previous left total knee arthroplasty, laparoscopic cholecystectomy March 2016, previous colonoscopies polypectomies, right breast biopsy, right hip surgery, lap band surgery, ADRIANO, recent back surgery July 2018,    Chronic pain, continue Cymbalta,    Depression anxiety, continues on Cymbalta 60 mg daily    Peripheral neuropathy lower legs,---CONT GABAPENTIN 600 MG TID     Previous pelvic fractures    Diverticulosis and diverticulitis in September 2015    C. difficile colitis diagnosed February 2016 treated resolved    small bowel enteritis small bowel perforation resolved 2016    Kidney stones    Alcohol use in the past,--has not drank in greater than 4 month as of March 2023    Previous ERCP Common bile duct stone cholelithiasis choledocholithiasis with stent placement and sphincterotomy February 29, 2016, status post laparoscopic cholecystectomy Dr. Huizar    History of urinary tract infection    Paroxysmal atrial fibrillation May 2019 with RVR, clinically in sinus rhythm    Hypertension, continues metoprolol XL 25 mg daily,    Obstructive sleep apnea, continue CPAP    Chronic lower extremity edema, continues on Lasix 40 mg daily, potassium supplements,, BR NP levels are extremely low at 89, March 2023 ------ recommend compression devices rather than more diuretics given the kidney numbers and heart numbers being good, November 2022    Elevated cholesterol continues on simvastatin 40 mg daily    Follow Up   Return in about 3 months (around 6/23/2023).  Patient was given instructions and counseling regarding her condition or for health maintenance advice. Please see specific information pulled into the AVS if appropriate.

## 2023-03-24 NOTE — TELEPHONE ENCOUNTER
Caller: JOSE    Relationship: Other    Best call back number: [phone]    What is the best time to reach you: ANYTIME    Who are you requesting to speak with (clinical staff, provider,  specific staff member): CLINICAL    What was the call regarding: [employer] FAXED A REQUEST FOR CHART NOTES AND A PRESCRIPTION FOR DIABETIC SUPPLIES. HE ALSO STATED DIAGNOSES OF DIABETES AND TREATMENT PLAN WOULD BE NEEDED. HE STATED THIS NEEDS TO FAXED BACK ASAP    Do you require a callback: YES

## 2023-03-30 ENCOUNTER — TELEPHONE (OUTPATIENT)
Dept: INTERNAL MEDICINE | Facility: CLINIC | Age: 78
End: 2023-03-30

## 2023-03-30 NOTE — TELEPHONE ENCOUNTER
Caller: [employer]    Relationship to patient: Other    Best call back number: [phone]    Patient is needing: CALLER IS WANTING TO VERIFY THAT A FAX WAS RECEIVED ON 03.25.2023. CALLER STATES FAX WAS IN REGARDS TO PATIENT'S DIABETIC TESTING SUPPLIES. CALLER STATES THAT THEY WILL BE NEEDING CURRENT CHART NOTES FOR DIAGNOSIS AND TREATMENT PLAN OF DIABETES.

## 2023-04-12 ENCOUNTER — TRANSCRIBE ORDERS (OUTPATIENT)
Dept: LAB | Facility: HOSPITAL | Age: 78
End: 2023-04-12
Payer: MEDICARE

## 2023-04-12 DIAGNOSIS — H04.123 DRY EYE SYNDROME, BILATERAL: ICD-10-CM

## 2023-04-12 DIAGNOSIS — Z96.1 LENS REPLACED BY OTHER MEANS: ICD-10-CM

## 2023-04-12 DIAGNOSIS — H53.2 DIPLOPIA: Primary | ICD-10-CM

## 2023-04-14 ENCOUNTER — LAB (OUTPATIENT)
Dept: LAB | Facility: HOSPITAL | Age: 78
End: 2023-04-14
Payer: MEDICARE

## 2023-04-14 DIAGNOSIS — H53.2 DIPLOPIA: ICD-10-CM

## 2023-04-14 DIAGNOSIS — Z96.1 LENS REPLACED BY OTHER MEANS: ICD-10-CM

## 2023-04-14 DIAGNOSIS — H04.123 DRY EYE SYNDROME, BILATERAL: ICD-10-CM

## 2023-04-14 LAB
T3 SERPL-MCNC: 107 NG/DL (ref 80–200)
T4 SERPL-MCNC: 6.49 MCG/DL (ref 4.5–11.7)
TSH SERPL DL<=0.05 MIU/L-ACNC: 1.86 UIU/ML (ref 0.27–4.2)

## 2023-04-14 PROCEDURE — 83519 RIA NONANTIBODY: CPT

## 2023-04-14 PROCEDURE — 84445 ASSAY OF TSI GLOBULIN: CPT

## 2023-04-14 PROCEDURE — 84436 ASSAY OF TOTAL THYROXINE: CPT

## 2023-04-14 PROCEDURE — 84480 ASSAY TRIIODOTHYRONINE (T3): CPT

## 2023-04-14 PROCEDURE — 84443 ASSAY THYROID STIM HORMONE: CPT

## 2023-04-14 PROCEDURE — 36415 COLL VENOUS BLD VENIPUNCTURE: CPT

## 2023-04-18 LAB — TSI SER-ACNC: <0.1 IU/L (ref 0–0.55)

## 2023-04-28 DIAGNOSIS — G47.33 OBSTRUCTIVE APNEA: ICD-10-CM

## 2023-04-28 DIAGNOSIS — E66.9 OBESITY (BMI 30-39.9): ICD-10-CM

## 2023-04-28 DIAGNOSIS — I10 ESSENTIAL HYPERTENSION: ICD-10-CM

## 2023-04-28 DIAGNOSIS — E78.2 MIXED HYPERLIPIDEMIA: ICD-10-CM

## 2023-04-28 DIAGNOSIS — E55.9 VITAMIN D DEFICIENCY: ICD-10-CM

## 2023-04-28 DIAGNOSIS — M79.89 LIMB SWELLING: ICD-10-CM

## 2023-04-28 DIAGNOSIS — G89.29 CHRONIC BILATERAL LOW BACK PAIN WITHOUT SCIATICA: ICD-10-CM

## 2023-04-28 DIAGNOSIS — M47.816 LUMBAR SPONDYLOSIS: ICD-10-CM

## 2023-04-28 DIAGNOSIS — M48.062 SPINAL STENOSIS OF LUMBAR REGION WITH NEUROGENIC CLAUDICATION: ICD-10-CM

## 2023-04-28 DIAGNOSIS — G25.81 RESTLESS LEG SYNDROME: ICD-10-CM

## 2023-04-28 DIAGNOSIS — E11.9 DIABETES MELLITUS WITH HEMOGLOBIN A1C GOAL OF 7.0%-8.0%: ICD-10-CM

## 2023-04-28 DIAGNOSIS — M54.50 CHRONIC BILATERAL LOW BACK PAIN WITHOUT SCIATICA: ICD-10-CM

## 2023-04-28 DIAGNOSIS — E11.40 TYPE 2 DIABETES MELLITUS WITH DIABETIC NEUROPATHY, WITHOUT LONG-TERM CURRENT USE OF INSULIN: ICD-10-CM

## 2023-04-28 DIAGNOSIS — F32.A DEPRESSION, UNSPECIFIED DEPRESSION TYPE: ICD-10-CM

## 2023-04-28 RX ORDER — GABAPENTIN 800 MG/1
800 TABLET ORAL 2 TIMES DAILY
Qty: 60 TABLET | Refills: 3 | Status: SHIPPED | OUTPATIENT
Start: 2023-04-28

## 2023-04-28 RX ORDER — POTASSIUM CHLORIDE 750 MG/1
TABLET, EXTENDED RELEASE ORAL
Qty: 90 TABLET | Refills: 3 | Status: SHIPPED | OUTPATIENT
Start: 2023-04-28

## 2023-04-28 RX ORDER — FUROSEMIDE 40 MG/1
TABLET ORAL
Qty: 90 TABLET | Refills: 1 | Status: SHIPPED | OUTPATIENT
Start: 2023-04-28

## 2023-04-28 NOTE — TELEPHONE ENCOUNTER
Rx Refill Note  Requested Prescriptions      No prescriptions requested or ordered in this encounter      Last office visit with prescribing clinician: 3/23/2023   Last telemedicine visit with prescribing clinician: 6/29/2023   Next office visit with prescribing clinician: 6/29/2023                         Would you like a call back once the refill request has been completed: [] Yes [] No    If the office needs to give you a call back, can they leave a voicemail: [] Yes [] No    Cordelia Rodriguez MA  04/28/23, 11:15 EDT

## 2023-05-05 RX ORDER — LANCETS
1 EACH MISCELLANEOUS 3 TIMES DAILY
Qty: 300 EACH | Refills: 3 | Status: SHIPPED | OUTPATIENT
Start: 2023-05-05

## 2023-05-08 LAB
ACHR BIND AB SER-SCNC: <0.03 NMOL/L (ref 0–0.24)
ACHR BLOCK AB SER-ACNC: 20 % (ref 0–25)
ACHR MOD AB SER QL FC: 0 % (ref 0–45)

## 2023-05-10 ENCOUNTER — TELEPHONE (OUTPATIENT)
Dept: INTERNAL MEDICINE | Facility: CLINIC | Age: 78
End: 2023-05-10

## 2023-05-10 NOTE — TELEPHONE ENCOUNTER
Caller: Ivania Mattson    Relationship: Self    Best call back number: [phone]    What medications are you currently taking:   Current Outpatient Medications on File Prior to Visit   Medication Sig Dispense Refill   • Accu-Chek Softclix Lancets lancets 1 each by Other route 3 (Three) Times a Day. 300 each 3   • aspirin 81 MG tablet Take 1 tablet by mouth Daily.     • Blood Glucose Calibration (Accu-Chek Lyndsey) solution      • colestipol (COLESTID) 1 g tablet Take 1 tablet by mouth Every Night.     • DULoxetine (CYMBALTA) 60 MG capsule Take 1 capsule by mouth Every Night. 90 capsule 3   • fluorouracil (EFUDEX) 5 % cream apply to face and chest EVERY DAY for 14 days     • furosemide (LASIX) 40 MG tablet TAKE 1 TABLET EVERY DAY 90 tablet 1   • gabapentin (NEURONTIN) 800 MG tablet Take 1 tablet by mouth 2 (Two) Times a Day. 60 tablet 3   • glucose blood (Accu-Chek Lyndsey Plus) test strip 1 each by Other route 3 (Three) Times a Day. 300 each 3   • HYDROcodone-acetaminophen (Norco) 5-325 MG per tablet Take 1 tablet by mouth Every 6 (Six) Hours As Needed for Moderate Pain. 60 tablet 0   • metFORMIN (GLUCOPHAGE) 850 MG tablet TAKE 1 TABLET TWICE DAILY 180 tablet 1   • metoprolol succinate XL (TOPROL-XL) 25 MG 24 hr tablet TAKE 1 TABLET TWICE DAILY 180 tablet 3   • nitroglycerin (Nitrostat) 0.4 MG SL tablet Place 1 tablet under the tongue Every 5 (Five) Minutes As Needed for Chest Pain. Take no more than 3 doses in 15 minutes. 30 tablet 12   • potassium chloride (K-DUR,KLOR-CON) 10 MEQ CR tablet TAKE 1 TABLET EVERY DAY 90 tablet 3   • rOPINIRole (Requip) 3 MG tablet Take 1 tablet by mouth 3 (Three) Times a Day. 90 tablet 5   • simvastatin (ZOCOR) 40 MG tablet TAKE 1 TABLET ONE TIME DAILY IN THE EVENING 90 tablet 3     No current facility-administered medications on file prior to visit.          When did you start taking these medications:15 YEARS     Which medication are you concerned about: rOPINIRole (Requip) 3 MG  tablet     PATIENT STATED MEDICATION IS NOT HELPING WITH PAIN AT ALL FOR RESTLESS LEGS. PATIENT SAID SHE IS SO MUCH PAIN AND NOT GETTING ANY REST AND WOULD LIKE TO TRY SOMETHING STRONGER.     PLEASE ADVISE

## 2023-05-15 ENCOUNTER — OFFICE VISIT (OUTPATIENT)
Dept: INTERNAL MEDICINE | Facility: CLINIC | Age: 78
End: 2023-05-15
Payer: MEDICARE

## 2023-05-15 VITALS
HEIGHT: 58 IN | BODY MASS INDEX: 34.68 KG/M2 | DIASTOLIC BLOOD PRESSURE: 64 MMHG | SYSTOLIC BLOOD PRESSURE: 92 MMHG | HEART RATE: 93 BPM | TEMPERATURE: 97.1 F | OXYGEN SATURATION: 95 % | WEIGHT: 165.2 LBS

## 2023-05-15 DIAGNOSIS — M79.89 LIMB SWELLING: ICD-10-CM

## 2023-05-15 DIAGNOSIS — E55.9 VITAMIN D DEFICIENCY: ICD-10-CM

## 2023-05-15 DIAGNOSIS — E78.2 MIXED HYPERLIPIDEMIA: ICD-10-CM

## 2023-05-15 DIAGNOSIS — M54.50 CHRONIC BILATERAL LOW BACK PAIN WITHOUT SCIATICA: ICD-10-CM

## 2023-05-15 DIAGNOSIS — G89.29 CHRONIC BILATERAL LOW BACK PAIN WITHOUT SCIATICA: ICD-10-CM

## 2023-05-15 DIAGNOSIS — E11.9 DIABETES MELLITUS WITH HEMOGLOBIN A1C GOAL OF 7.0%-8.0%: ICD-10-CM

## 2023-05-15 DIAGNOSIS — E11.40 TYPE 2 DIABETES MELLITUS WITH DIABETIC NEUROPATHY, WITHOUT LONG-TERM CURRENT USE OF INSULIN: ICD-10-CM

## 2023-05-15 DIAGNOSIS — D50.9 IRON DEFICIENCY ANEMIA, UNSPECIFIED IRON DEFICIENCY ANEMIA TYPE: ICD-10-CM

## 2023-05-15 DIAGNOSIS — E66.9 OBESITY (BMI 30-39.9): ICD-10-CM

## 2023-05-15 DIAGNOSIS — F32.A DEPRESSION, UNSPECIFIED DEPRESSION TYPE: ICD-10-CM

## 2023-05-15 DIAGNOSIS — M47.816 LUMBAR SPONDYLOSIS: ICD-10-CM

## 2023-05-15 DIAGNOSIS — E61.1 IRON DEFICIENCY: ICD-10-CM

## 2023-05-15 DIAGNOSIS — G47.33 OBSTRUCTIVE APNEA: ICD-10-CM

## 2023-05-15 DIAGNOSIS — G25.81 RESTLESS LEG SYNDROME: Primary | ICD-10-CM

## 2023-05-15 DIAGNOSIS — M48.062 SPINAL STENOSIS OF LUMBAR REGION WITH NEUROGENIC CLAUDICATION: ICD-10-CM

## 2023-05-15 DIAGNOSIS — I10 ESSENTIAL HYPERTENSION: ICD-10-CM

## 2023-05-15 RX ORDER — ROPINIROLE 5 MG/1
10 TABLET, FILM COATED ORAL NIGHTLY
Qty: 60 TABLET | Refills: 5 | Status: SHIPPED | OUTPATIENT
Start: 2023-05-15 | End: 2023-05-15

## 2023-05-15 RX ORDER — GABAPENTIN 800 MG/1
800 TABLET ORAL 2 TIMES DAILY
Qty: 180 TABLET | Refills: 3 | Status: SHIPPED | OUTPATIENT
Start: 2023-05-15

## 2023-05-15 RX ORDER — ROPINIROLE 5 MG/1
10 TABLET, FILM COATED ORAL NIGHTLY
Qty: 60 TABLET | Refills: 5 | Status: SHIPPED | OUTPATIENT
Start: 2023-05-15

## 2023-05-15 NOTE — PROGRESS NOTES
"CHIEF COMPLAINT/ HPI:  Restless Legs Syndrome (Patient states her RLS is getting bad and its hard for her to sleep)    Says her restless legs are getting worse, she is not sleeping but 1 or 2 hours maybe 3 at night she increased her restless leg medicine Requip but that does not last very long,    Patient had a car door hit her leg and cut it about 2 weeks ago, she had to have stitches, left lateral leg,    Patient has a history of some mild anemia iron deficiency state?,  Says she has to go outside and put her feet on the cold concrete at times to get relief, she cannot sleep, she is very miserable      Objective   Vital Signs  Vitals:    05/15/23 1557   BP: 92/64   Pulse: 93   Temp: 97.1 °F (36.2 °C)   SpO2: 95%   Weight: 74.9 kg (165 lb 3.2 oz)   Height: 147.3 cm (57.99\")      Body mass index is 34.54 kg/m².  Review of Systems , not sleeping , restless legs constantly, has to get up at night and walk around,  Physical Exam patient is alert and oriented x3 ambulatory with rollator walker, cardiovascular regular rhythm, lower extremities --2-3+ edema, she has a bandage on the left lateral lower leg with swelling and hematoma like area on the lateral left lower leg, she is got 2-3+ edema bilateral, left greater than right, lungs are clear anteriorly and laterally, she is alert and oriented ambulatory,  Result Review :   Lab Results   Component Value Date    PROBNP 89.7 03/21/2023    PROBNP 90.1 05/25/2022    PROBNP 44.6 10/11/2021    BNP 80 05/20/2019     05/16/2019    BNP 4275 (H) 05/12/2019     CMP        8/27/2022    05:30 11/28/2022    11:24 3/21/2023    09:35   CMP   Glucose 106   95   76     BUN 14   9   8     Creatinine 0.88   0.84   0.76     EGFR 67.4   70.8   79.8     Sodium 144   140   140     Potassium 4.3   4.4   4.7     Chloride 105   102   103     Calcium 9.0   9.8   9.2     Total Protein 6.4   7.0   7.4     Albumin 4.00   4.30   4.3     Globulin 2.4   2.7   3.1     Total Bilirubin 0.2   0.2   " 0.3     Alkaline Phosphatase 58   65   65     AST (SGOT) 36   35   33     ALT (SGPT) 18   21   12     Albumin/Globulin Ratio 1.7   1.6   1.4     BUN/Creatinine Ratio 15.9   10.7   10.5     Anion Gap 11.0   11.0   9.0       CBC w/diff        8/27/2022    05:30 11/28/2022    11:24 3/21/2023    09:35   CBC w/Diff   WBC 7.71   5.04   5.69     RBC 4.22   4.68   4.85     Hemoglobin 12.7   13.5   13.4     Hematocrit 38.4   41.2   41.0     MCV 91.0   88.0   84.5     MCH 30.1   28.8   27.6     MCHC 33.1   32.8   32.7     RDW 14.5   13.5   15.0     Platelets 222   252   221     Neutrophil Rel % 67.7   46.0   58.6     Immature Granulocyte Rel % 0.6   0.6   0.4     Lymphocyte Rel % 23.3   41.5   30.9     Monocyte Rel % 7.1   9.7   8.3     Eosinophil Rel % 1.0   2.0   1.6     Basophil Rel % 0.3   0.2   0.2        Lipid Panel        5/25/2022    10:24 3/21/2023    09:35   Lipid Panel   Total Cholesterol 110   136     Triglycerides 121   101     HDL Cholesterol 32   47     VLDL Cholesterol 22   19     LDL Cholesterol  56   70     LDL/HDL Ratio 1.68   1.46        Lab Results   Component Value Date    TSH 1.860 04/14/2023    TSH 2.030 05/25/2022    TSH 1.650 10/11/2021      Lab Results   Component Value Date    FREET4 1.08 05/25/2022    FREET4 1.4 05/10/2019      A1C Last 3 Results        5/25/2022    10:24 11/28/2022    11:24 3/21/2023    09:35   HGBA1C Last 3 Results   Hemoglobin A1C 6.20   5.60   5.80                         Visit Diagnoses:    ICD-10-CM ICD-9-CM   1. Restless leg syndrome  G25.81 333.94   2. Iron deficiency  E61.1 280.9   3. Spinal stenosis of lumbar region with neurogenic claudication  M48.062 724.03   4. Limb swelling  M79.89 729.81   5. Diabetes mellitus with hemoglobin A1c goal of 7.0%-8.0%  E11.9 250.00   6. Mixed hyperlipidemia  E78.2 272.2   7. Depression, unspecified depression type  F32.A 311   8. Obesity (BMI 30-39.9)  E66.9 278.00   9. Type 2 diabetes mellitus with diabetic neuropathy, without  long-term current use of insulin  E11.40 250.60     357.2   10. Essential hypertension  I10 401.9   11. Vitamin D deficiency  E55.9 268.9   12. Lumbar spondylosis  M47.816 721.3   13. Obstructive apnea  G47.33 327.23   14. Chronic bilateral low back pain without sciatica  M54.50 724.2    G89.29 338.29   15. Iron deficiency anemia, unspecified iron deficiency anemia type  D50.9 280.9       Assessment and Plan   Diagnoses and all orders for this visit:    1. Restless leg syndrome (Primary)  -     Comprehensive Metabolic Panel; Future  -     CBC & Differential; Future  -     Ferritin; Future  -     Iron Profile; Future  -     gabapentin (NEURONTIN) 800 MG tablet; Take 1 tablet by mouth 2 (Two) Times a Day.  Dispense: 180 tablet; Refill: 3  -     rOPINIRole (Requip) 5 MG tablet; Take 2 tablets by mouth Every Night.  Dispense: 60 tablet; Refill: 5    2. Iron deficiency  -     Comprehensive Metabolic Panel; Future  -     CBC & Differential; Future  -     Ferritin; Future  -     Iron Profile; Future  -     gabapentin (NEURONTIN) 800 MG tablet; Take 1 tablet by mouth 2 (Two) Times a Day.  Dispense: 180 tablet; Refill: 3  -     rOPINIRole (Requip) 5 MG tablet; Take 2 tablets by mouth Every Night.  Dispense: 60 tablet; Refill: 5    3. Spinal stenosis of lumbar region with neurogenic claudication  -     Comprehensive Metabolic Panel; Future  -     CBC & Differential; Future  -     Ferritin; Future  -     Iron Profile; Future  -     gabapentin (NEURONTIN) 800 MG tablet; Take 1 tablet by mouth 2 (Two) Times a Day.  Dispense: 180 tablet; Refill: 3  -     rOPINIRole (Requip) 5 MG tablet; Take 2 tablets by mouth Every Night.  Dispense: 60 tablet; Refill: 5    4. Limb swelling  -     Comprehensive Metabolic Panel; Future  -     CBC & Differential; Future  -     Ferritin; Future  -     Iron Profile; Future  -     gabapentin (NEURONTIN) 800 MG tablet; Take 1 tablet by mouth 2 (Two) Times a Day.  Dispense: 180 tablet; Refill: 3  -      rOPINIRole (Requip) 5 MG tablet; Take 2 tablets by mouth Every Night.  Dispense: 60 tablet; Refill: 5    5. Diabetes mellitus with hemoglobin A1c goal of 7.0%-8.0%  -     Comprehensive Metabolic Panel; Future  -     CBC & Differential; Future  -     Ferritin; Future  -     Iron Profile; Future  -     gabapentin (NEURONTIN) 800 MG tablet; Take 1 tablet by mouth 2 (Two) Times a Day.  Dispense: 180 tablet; Refill: 3  -     rOPINIRole (Requip) 5 MG tablet; Take 2 tablets by mouth Every Night.  Dispense: 60 tablet; Refill: 5    6. Mixed hyperlipidemia  -     Comprehensive Metabolic Panel; Future  -     CBC & Differential; Future  -     Ferritin; Future  -     Iron Profile; Future  -     gabapentin (NEURONTIN) 800 MG tablet; Take 1 tablet by mouth 2 (Two) Times a Day.  Dispense: 180 tablet; Refill: 3  -     rOPINIRole (Requip) 5 MG tablet; Take 2 tablets by mouth Every Night.  Dispense: 60 tablet; Refill: 5    7. Depression, unspecified depression type  -     Comprehensive Metabolic Panel; Future  -     CBC & Differential; Future  -     Ferritin; Future  -     Iron Profile; Future  -     gabapentin (NEURONTIN) 800 MG tablet; Take 1 tablet by mouth 2 (Two) Times a Day.  Dispense: 180 tablet; Refill: 3  -     rOPINIRole (Requip) 5 MG tablet; Take 2 tablets by mouth Every Night.  Dispense: 60 tablet; Refill: 5    8. Obesity (BMI 30-39.9)  -     Comprehensive Metabolic Panel; Future  -     CBC & Differential; Future  -     Ferritin; Future  -     Iron Profile; Future  -     gabapentin (NEURONTIN) 800 MG tablet; Take 1 tablet by mouth 2 (Two) Times a Day.  Dispense: 180 tablet; Refill: 3  -     rOPINIRole (Requip) 5 MG tablet; Take 2 tablets by mouth Every Night.  Dispense: 60 tablet; Refill: 5    9. Type 2 diabetes mellitus with diabetic neuropathy, without long-term current use of insulin  -     Comprehensive Metabolic Panel; Future  -     CBC & Differential; Future  -     Ferritin; Future  -     Iron Profile; Future  -      gabapentin (NEURONTIN) 800 MG tablet; Take 1 tablet by mouth 2 (Two) Times a Day.  Dispense: 180 tablet; Refill: 3  -     rOPINIRole (Requip) 5 MG tablet; Take 2 tablets by mouth Every Night.  Dispense: 60 tablet; Refill: 5    10. Essential hypertension  -     Comprehensive Metabolic Panel; Future  -     CBC & Differential; Future  -     Ferritin; Future  -     Iron Profile; Future  -     gabapentin (NEURONTIN) 800 MG tablet; Take 1 tablet by mouth 2 (Two) Times a Day.  Dispense: 180 tablet; Refill: 3  -     rOPINIRole (Requip) 5 MG tablet; Take 2 tablets by mouth Every Night.  Dispense: 60 tablet; Refill: 5    11. Vitamin D deficiency  -     Comprehensive Metabolic Panel; Future  -     CBC & Differential; Future  -     Ferritin; Future  -     Iron Profile; Future  -     gabapentin (NEURONTIN) 800 MG tablet; Take 1 tablet by mouth 2 (Two) Times a Day.  Dispense: 180 tablet; Refill: 3  -     rOPINIRole (Requip) 5 MG tablet; Take 2 tablets by mouth Every Night.  Dispense: 60 tablet; Refill: 5    12. Lumbar spondylosis  -     Comprehensive Metabolic Panel; Future  -     CBC & Differential; Future  -     Ferritin; Future  -     Iron Profile; Future  -     gabapentin (NEURONTIN) 800 MG tablet; Take 1 tablet by mouth 2 (Two) Times a Day.  Dispense: 180 tablet; Refill: 3  -     rOPINIRole (Requip) 5 MG tablet; Take 2 tablets by mouth Every Night.  Dispense: 60 tablet; Refill: 5    13. Obstructive apnea  -     Comprehensive Metabolic Panel; Future  -     CBC & Differential; Future  -     Ferritin; Future  -     Iron Profile; Future  -     gabapentin (NEURONTIN) 800 MG tablet; Take 1 tablet by mouth 2 (Two) Times a Day.  Dispense: 180 tablet; Refill: 3  -     rOPINIRole (Requip) 5 MG tablet; Take 2 tablets by mouth Every Night.  Dispense: 60 tablet; Refill: 5    14. Chronic bilateral low back pain without sciatica  -     Comprehensive Metabolic Panel; Future  -     CBC & Differential; Future  -     Ferritin; Future  -      Iron Profile; Future  -     gabapentin (NEURONTIN) 800 MG tablet; Take 1 tablet by mouth 2 (Two) Times a Day.  Dispense: 180 tablet; Refill: 3  -     rOPINIRole (Requip) 5 MG tablet; Take 2 tablets by mouth Every Night.  Dispense: 60 tablet; Refill: 5    15. Iron deficiency anemia, unspecified iron deficiency anemia type  -     Comprehensive Metabolic Panel; Future  -     CBC & Differential; Future  -     Ferritin; Future  -     Iron Profile; Future  -     gabapentin (NEURONTIN) 800 MG tablet; Take 1 tablet by mouth 2 (Two) Times a Day.  Dispense: 180 tablet; Refill: 3  -     rOPINIRole (Requip) 5 MG tablet; Take 2 tablets by mouth Every Night.  Dispense: 60 tablet; Refill: 5             Restless legs,--already on requip 3 mg tid and prn hydocodone   Will set up iv iron , check levels and cbc --- increasing Requip to 10 mg total at bedtime and take gabapentin 800 mg nightly, she can take 1 hydrocodone at bedtime also, if she cannot sleep    Will  check iron and consider IV iron infusions, I am going to increase her Requip to 5 mg 2 tablets at bedtime,        Follow Up   No follow-ups on file.  Patient was given instructions and counseling regarding her condition or for health maintenance advice. Please see specific information pulled into the AVS if appropriate.

## 2023-05-16 ENCOUNTER — LAB (OUTPATIENT)
Dept: LAB | Facility: HOSPITAL | Age: 78
End: 2023-05-16
Payer: MEDICARE

## 2023-05-16 ENCOUNTER — TELEPHONE (OUTPATIENT)
Dept: INTERNAL MEDICINE | Facility: CLINIC | Age: 78
End: 2023-05-16
Payer: MEDICARE

## 2023-05-16 DIAGNOSIS — E66.9 OBESITY (BMI 30-39.9): ICD-10-CM

## 2023-05-16 DIAGNOSIS — E61.1 IRON DEFICIENCY: ICD-10-CM

## 2023-05-16 DIAGNOSIS — M48.062 SPINAL STENOSIS OF LUMBAR REGION WITH NEUROGENIC CLAUDICATION: ICD-10-CM

## 2023-05-16 DIAGNOSIS — G47.33 OBSTRUCTIVE APNEA: ICD-10-CM

## 2023-05-16 DIAGNOSIS — G25.81 RESTLESS LEG SYNDROME: ICD-10-CM

## 2023-05-16 DIAGNOSIS — F32.A DEPRESSION, UNSPECIFIED DEPRESSION TYPE: ICD-10-CM

## 2023-05-16 DIAGNOSIS — E78.2 MIXED HYPERLIPIDEMIA: ICD-10-CM

## 2023-05-16 DIAGNOSIS — M54.50 CHRONIC BILATERAL LOW BACK PAIN WITHOUT SCIATICA: ICD-10-CM

## 2023-05-16 DIAGNOSIS — M79.89 LIMB SWELLING: ICD-10-CM

## 2023-05-16 DIAGNOSIS — M47.816 LUMBAR SPONDYLOSIS: ICD-10-CM

## 2023-05-16 DIAGNOSIS — E11.40 TYPE 2 DIABETES MELLITUS WITH DIABETIC NEUROPATHY, WITHOUT LONG-TERM CURRENT USE OF INSULIN: ICD-10-CM

## 2023-05-16 DIAGNOSIS — E55.9 VITAMIN D DEFICIENCY: ICD-10-CM

## 2023-05-16 DIAGNOSIS — D50.9 IRON DEFICIENCY ANEMIA, UNSPECIFIED IRON DEFICIENCY ANEMIA TYPE: ICD-10-CM

## 2023-05-16 DIAGNOSIS — G89.29 CHRONIC BILATERAL LOW BACK PAIN WITHOUT SCIATICA: ICD-10-CM

## 2023-05-16 DIAGNOSIS — E11.9 DIABETES MELLITUS WITH HEMOGLOBIN A1C GOAL OF 7.0%-8.0%: ICD-10-CM

## 2023-05-16 DIAGNOSIS — I10 ESSENTIAL HYPERTENSION: ICD-10-CM

## 2023-05-16 DIAGNOSIS — E61.1 IRON DEFICIENCY: Primary | ICD-10-CM

## 2023-05-16 LAB
ALBUMIN SERPL-MCNC: 4.1 G/DL (ref 3.5–5.2)
ALBUMIN/GLOB SERPL: 1.5 G/DL
ALP SERPL-CCNC: 59 U/L (ref 39–117)
ALT SERPL W P-5'-P-CCNC: 12 U/L (ref 1–33)
ANION GAP SERPL CALCULATED.3IONS-SCNC: 9 MMOL/L (ref 5–15)
AST SERPL-CCNC: 34 U/L (ref 1–32)
BASOPHILS # BLD AUTO: 0 10*3/MM3 (ref 0–0.2)
BASOPHILS NFR BLD AUTO: 0 % (ref 0–1.5)
BILIRUB SERPL-MCNC: 0.2 MG/DL (ref 0–1.2)
BUN SERPL-MCNC: 11 MG/DL (ref 8–23)
BUN/CREAT SERPL: 11.2 (ref 7–25)
CALCIUM SPEC-SCNC: 8.3 MG/DL (ref 8.6–10.5)
CHLORIDE SERPL-SCNC: 103 MMOL/L (ref 98–107)
CO2 SERPL-SCNC: 28 MMOL/L (ref 22–29)
CREAT SERPL-MCNC: 0.98 MG/DL (ref 0.57–1)
DEPRECATED RDW RBC AUTO: 40.2 FL (ref 37–54)
EGFRCR SERPLBLD CKD-EPI 2021: 58.8 ML/MIN/1.73
EOSINOPHIL # BLD AUTO: 0.08 10*3/MM3 (ref 0–0.4)
EOSINOPHIL NFR BLD AUTO: 1.8 % (ref 0.3–6.2)
ERYTHROCYTE [DISTWIDTH] IN BLOOD BY AUTOMATED COUNT: 14.1 % (ref 12.3–15.4)
FERRITIN SERPL-MCNC: 22.8 NG/ML (ref 13–150)
GLOBULIN UR ELPH-MCNC: 2.7 GM/DL
GLUCOSE SERPL-MCNC: 89 MG/DL (ref 65–99)
HCT VFR BLD AUTO: 30.4 % (ref 34–46.6)
HGB BLD-MCNC: 9.9 G/DL (ref 12–15.9)
IMM GRANULOCYTES # BLD AUTO: 0.02 10*3/MM3 (ref 0–0.05)
IMM GRANULOCYTES NFR BLD AUTO: 0.4 % (ref 0–0.5)
IRON 24H UR-MRATE: 26 MCG/DL (ref 37–145)
IRON SATN MFR SERPL: 5 % (ref 20–50)
LYMPHOCYTES # BLD AUTO: 1.53 10*3/MM3 (ref 0.7–3.1)
LYMPHOCYTES NFR BLD AUTO: 34.3 % (ref 19.6–45.3)
MCH RBC QN AUTO: 25.8 PG (ref 26.6–33)
MCHC RBC AUTO-ENTMCNC: 32.6 G/DL (ref 31.5–35.7)
MCV RBC AUTO: 79.2 FL (ref 79–97)
MONOCYTES # BLD AUTO: 0.49 10*3/MM3 (ref 0.1–0.9)
MONOCYTES NFR BLD AUTO: 11 % (ref 5–12)
NEUTROPHILS NFR BLD AUTO: 2.34 10*3/MM3 (ref 1.7–7)
NEUTROPHILS NFR BLD AUTO: 52.5 % (ref 42.7–76)
NRBC BLD AUTO-RTO: 0 /100 WBC (ref 0–0.2)
PLATELET # BLD AUTO: 246 10*3/MM3 (ref 140–450)
PMV BLD AUTO: 10 FL (ref 6–12)
POTASSIUM SERPL-SCNC: 3.8 MMOL/L (ref 3.5–5.2)
PROT SERPL-MCNC: 6.8 G/DL (ref 6–8.5)
RBC # BLD AUTO: 3.84 10*6/MM3 (ref 3.77–5.28)
SODIUM SERPL-SCNC: 140 MMOL/L (ref 136–145)
TIBC SERPL-MCNC: 508 MCG/DL (ref 298–536)
TRANSFERRIN SERPL-MCNC: 341 MG/DL (ref 200–360)
WBC NRBC COR # BLD: 4.46 10*3/MM3 (ref 3.4–10.8)

## 2023-05-16 PROCEDURE — 85025 COMPLETE CBC W/AUTO DIFF WBC: CPT

## 2023-05-16 PROCEDURE — 83540 ASSAY OF IRON: CPT

## 2023-05-16 PROCEDURE — 84466 ASSAY OF TRANSFERRIN: CPT

## 2023-05-16 PROCEDURE — 82728 ASSAY OF FERRITIN: CPT

## 2023-05-16 PROCEDURE — 36415 COLL VENOUS BLD VENIPUNCTURE: CPT

## 2023-05-16 PROCEDURE — 80053 COMPREHEN METABOLIC PANEL: CPT

## 2023-05-16 RX ORDER — ROPINIROLE 3 MG/1
TABLET, FILM COATED ORAL
Qty: 270 TABLET | Refills: 1 | Status: SHIPPED | OUTPATIENT
Start: 2023-05-16

## 2023-05-16 RX ORDER — DIPHENHYDRAMINE HYDROCHLORIDE 50 MG/ML
50 INJECTION INTRAMUSCULAR; INTRAVENOUS AS NEEDED
OUTPATIENT
Start: 2023-05-18

## 2023-05-16 RX ORDER — ACETAMINOPHEN 325 MG/1
650 TABLET ORAL ONCE
OUTPATIENT
Start: 2023-05-18 | End: 2023-05-18

## 2023-05-16 RX ORDER — FAMOTIDINE 10 MG/ML
20 INJECTION, SOLUTION INTRAVENOUS AS NEEDED
OUTPATIENT
Start: 2023-05-18

## 2023-05-16 RX ORDER — SODIUM CHLORIDE 9 MG/ML
250 INJECTION, SOLUTION INTRAVENOUS ONCE
OUTPATIENT
Start: 2023-05-18

## 2023-05-16 RX ORDER — SIMVASTATIN 40 MG
TABLET ORAL
Qty: 90 TABLET | Refills: 3 | Status: SHIPPED | OUTPATIENT
Start: 2023-05-16

## 2023-05-16 RX ORDER — DULOXETIN HYDROCHLORIDE 60 MG/1
CAPSULE, DELAYED RELEASE ORAL
Qty: 90 CAPSULE | Refills: 3 | Status: SHIPPED | OUTPATIENT
Start: 2023-05-16

## 2023-05-16 RX ORDER — DIPHENHYDRAMINE HCL 25 MG
25 CAPSULE ORAL ONCE
OUTPATIENT
Start: 2023-05-18 | End: 2023-05-18

## 2023-05-16 RX ORDER — CETIRIZINE HYDROCHLORIDE 10 MG/1
10 TABLET ORAL ONCE
OUTPATIENT
Start: 2023-05-18 | End: 2023-05-18

## 2023-05-16 RX ORDER — FAMOTIDINE 10 MG/ML
20 INJECTION, SOLUTION INTRAVENOUS ONCE
OUTPATIENT
Start: 2023-05-18 | End: 2023-05-18

## 2023-05-16 NOTE — TELEPHONE ENCOUNTER
Call patient tell her she is very anemic and very low in iron as we might as suspected, I am going to go ahead and set her up for IV iron infusions, and they will call her from the hospital hopefully within the next couple days to a week and get those set up for her to come in and do and that should help her restless legs a lot, I will need to see her back at her regular appointment next month with blood work again to check the blood counts to make sure that her blood counts are improved,

## 2023-05-24 ENCOUNTER — TELEPHONE (OUTPATIENT)
Dept: INTERNAL MEDICINE | Facility: CLINIC | Age: 78
End: 2023-05-24
Payer: MEDICARE

## 2023-05-24 DIAGNOSIS — D50.9 IRON DEFICIENCY ANEMIA, UNSPECIFIED IRON DEFICIENCY ANEMIA TYPE: ICD-10-CM

## 2023-05-24 DIAGNOSIS — E61.1 IRON DEFICIENCY: Primary | ICD-10-CM

## 2023-05-24 RX ORDER — DIPHENHYDRAMINE HCL 25 MG
25 CAPSULE ORAL ONCE
OUTPATIENT
Start: 2023-05-25

## 2023-05-24 RX ORDER — DIPHENHYDRAMINE HYDROCHLORIDE 50 MG/ML
50 INJECTION INTRAMUSCULAR; INTRAVENOUS AS NEEDED
OUTPATIENT
Start: 2023-05-25

## 2023-05-24 RX ORDER — FAMOTIDINE 10 MG/ML
20 INJECTION, SOLUTION INTRAVENOUS ONCE
OUTPATIENT
Start: 2023-05-25

## 2023-05-24 RX ORDER — ACETAMINOPHEN 325 MG/1
650 TABLET ORAL ONCE
OUTPATIENT
Start: 2023-05-25

## 2023-05-24 RX ORDER — DIPHENHYDRAMINE HYDROCHLORIDE 50 MG/ML
25 INJECTION INTRAMUSCULAR; INTRAVENOUS ONCE
OUTPATIENT
Start: 2023-05-25

## 2023-05-24 RX ORDER — FAMOTIDINE 10 MG/ML
20 INJECTION, SOLUTION INTRAVENOUS AS NEEDED
OUTPATIENT
Start: 2023-05-25

## 2023-05-24 RX ORDER — SODIUM CHLORIDE 9 MG/ML
250 INJECTION, SOLUTION INTRAVENOUS ONCE
OUTPATIENT
Start: 2023-05-25

## 2023-05-24 RX ORDER — FAMOTIDINE 20 MG/1
20 TABLET, FILM COATED ORAL ONCE
OUTPATIENT
Start: 2023-05-25

## 2023-05-25 ENCOUNTER — HOSPITAL ENCOUNTER (OUTPATIENT)
Dept: INFUSION THERAPY | Facility: HOSPITAL | Age: 78
End: 2023-05-25

## 2023-06-01 ENCOUNTER — HOSPITAL ENCOUNTER (OUTPATIENT)
Dept: INFUSION THERAPY | Facility: HOSPITAL | Age: 78
Discharge: HOME | End: 2023-06-01
Admitting: INTERNAL MEDICINE

## 2023-06-01 VITALS
TEMPERATURE: 98.1 F | HEART RATE: 68 BPM | RESPIRATION RATE: 18 BRPM | OXYGEN SATURATION: 99 % | WEIGHT: 161.38 LBS | DIASTOLIC BLOOD PRESSURE: 60 MMHG | HEIGHT: 59 IN | BODY MASS INDEX: 32.53 KG/M2 | SYSTOLIC BLOOD PRESSURE: 116 MMHG

## 2023-06-01 DIAGNOSIS — E61.1 IRON DEFICIENCY: Primary | ICD-10-CM

## 2023-06-01 DIAGNOSIS — D50.9 IRON DEFICIENCY ANEMIA, UNSPECIFIED IRON DEFICIENCY ANEMIA TYPE: ICD-10-CM

## 2023-06-01 PROCEDURE — 96374 THER/PROPH/DIAG INJ IV PUSH: CPT

## 2023-06-01 PROCEDURE — 63710000001 DIPHENHYDRAMINE PER 50 MG: Performed by: INTERNAL MEDICINE

## 2023-06-01 PROCEDURE — 25010000002 IRON SUCROSE PER 1 MG: Performed by: INTERNAL MEDICINE

## 2023-06-01 RX ORDER — DIPHENHYDRAMINE HYDROCHLORIDE 50 MG/ML
50 INJECTION INTRAMUSCULAR; INTRAVENOUS AS NEEDED
OUTPATIENT
Start: 2023-06-02

## 2023-06-01 RX ORDER — DIPHENHYDRAMINE HCL 25 MG
25 CAPSULE ORAL ONCE
Status: COMPLETED | OUTPATIENT
Start: 2023-06-01 | End: 2023-06-01

## 2023-06-01 RX ORDER — ACETAMINOPHEN 325 MG/1
650 TABLET ORAL ONCE
OUTPATIENT
Start: 2023-06-02

## 2023-06-01 RX ORDER — DIPHENHYDRAMINE HCL 25 MG
25 CAPSULE ORAL ONCE
OUTPATIENT
Start: 2023-06-02

## 2023-06-01 RX ORDER — SODIUM CHLORIDE 9 MG/ML
250 INJECTION, SOLUTION INTRAVENOUS ONCE
OUTPATIENT
Start: 2023-06-02

## 2023-06-01 RX ORDER — SODIUM CHLORIDE 9 MG/ML
250 INJECTION, SOLUTION INTRAVENOUS ONCE
Status: DISCONTINUED | OUTPATIENT
Start: 2023-06-01 | End: 2023-06-03 | Stop reason: HOSPADM

## 2023-06-01 RX ORDER — FAMOTIDINE 20 MG/1
20 TABLET, FILM COATED ORAL ONCE
Status: DISCONTINUED | OUTPATIENT
Start: 2023-06-01 | End: 2023-06-03 | Stop reason: HOSPADM

## 2023-06-01 RX ORDER — FAMOTIDINE 20 MG/1
20 TABLET, FILM COATED ORAL ONCE
OUTPATIENT
Start: 2023-06-02

## 2023-06-01 RX ORDER — FAMOTIDINE 10 MG/ML
20 INJECTION, SOLUTION INTRAVENOUS AS NEEDED
OUTPATIENT
Start: 2023-06-02

## 2023-06-01 RX ORDER — ACETAMINOPHEN 325 MG/1
650 TABLET ORAL ONCE
Status: COMPLETED | OUTPATIENT
Start: 2023-06-01 | End: 2023-06-01

## 2023-06-01 RX ADMIN — IRON SUCROSE 200 MG: 20 INJECTION, SOLUTION INTRAVENOUS at 14:34

## 2023-06-01 RX ADMIN — DIPHENHYDRAMINE HYDROCHLORIDE 25 MG: 25 CAPSULE ORAL at 13:59

## 2023-06-01 RX ADMIN — ACETAMINOPHEN 650 MG: 325 TABLET ORAL at 13:59

## 2023-06-06 ENCOUNTER — HOSPITAL ENCOUNTER (OUTPATIENT)
Dept: INFUSION THERAPY | Facility: HOSPITAL | Age: 78
Discharge: HOME | End: 2023-06-06
Admitting: INTERNAL MEDICINE
Payer: MEDICARE

## 2023-06-06 VITALS
OXYGEN SATURATION: 99 % | TEMPERATURE: 98.3 F | SYSTOLIC BLOOD PRESSURE: 118 MMHG | RESPIRATION RATE: 18 BRPM | DIASTOLIC BLOOD PRESSURE: 69 MMHG | HEART RATE: 85 BPM

## 2023-06-06 DIAGNOSIS — D50.9 IRON DEFICIENCY ANEMIA, UNSPECIFIED IRON DEFICIENCY ANEMIA TYPE: ICD-10-CM

## 2023-06-06 DIAGNOSIS — E61.1 IRON DEFICIENCY: Primary | ICD-10-CM

## 2023-06-06 PROCEDURE — 25010000002 IRON SUCROSE PER 1 MG: Performed by: INTERNAL MEDICINE

## 2023-06-06 PROCEDURE — 96374 THER/PROPH/DIAG INJ IV PUSH: CPT

## 2023-06-06 RX ORDER — SODIUM CHLORIDE 9 MG/ML
250 INJECTION, SOLUTION INTRAVENOUS ONCE
Status: CANCELLED | OUTPATIENT
Start: 2023-06-07

## 2023-06-06 RX ORDER — FAMOTIDINE 10 MG/ML
20 INJECTION, SOLUTION INTRAVENOUS AS NEEDED
Status: CANCELLED | OUTPATIENT
Start: 2023-06-07

## 2023-06-06 RX ORDER — DIPHENHYDRAMINE HCL 25 MG
25 CAPSULE ORAL ONCE
Status: CANCELLED | OUTPATIENT
Start: 2023-06-07

## 2023-06-06 RX ORDER — ACETAMINOPHEN 325 MG/1
650 TABLET ORAL ONCE
Status: DISCONTINUED | OUTPATIENT
Start: 2023-06-06 | End: 2023-06-08 | Stop reason: HOSPADM

## 2023-06-06 RX ORDER — FAMOTIDINE 20 MG/1
20 TABLET, FILM COATED ORAL ONCE
Status: CANCELLED | OUTPATIENT
Start: 2023-06-07

## 2023-06-06 RX ORDER — DIPHENHYDRAMINE HYDROCHLORIDE 50 MG/ML
50 INJECTION INTRAMUSCULAR; INTRAVENOUS AS NEEDED
Status: CANCELLED | OUTPATIENT
Start: 2023-06-07

## 2023-06-06 RX ORDER — SODIUM CHLORIDE 9 MG/ML
250 INJECTION, SOLUTION INTRAVENOUS ONCE
Status: DISCONTINUED | OUTPATIENT
Start: 2023-06-06 | End: 2023-06-08 | Stop reason: HOSPADM

## 2023-06-06 RX ORDER — FAMOTIDINE 20 MG/1
20 TABLET, FILM COATED ORAL ONCE
Status: DISCONTINUED | OUTPATIENT
Start: 2023-06-06 | End: 2023-06-08 | Stop reason: HOSPADM

## 2023-06-06 RX ORDER — ACETAMINOPHEN 325 MG/1
650 TABLET ORAL ONCE
Status: CANCELLED | OUTPATIENT
Start: 2023-06-07

## 2023-06-06 RX ORDER — DIPHENHYDRAMINE HCL 25 MG
25 CAPSULE ORAL ONCE
Status: DISCONTINUED | OUTPATIENT
Start: 2023-06-06 | End: 2023-06-08 | Stop reason: HOSPADM

## 2023-06-07 ENCOUNTER — HOSPITAL ENCOUNTER (OUTPATIENT)
Dept: INFUSION THERAPY | Facility: HOSPITAL | Age: 78
Discharge: HOME | End: 2023-06-07
Admitting: INTERNAL MEDICINE
Payer: MEDICARE

## 2023-06-07 VITALS
TEMPERATURE: 98.2 F | RESPIRATION RATE: 20 BRPM | SYSTOLIC BLOOD PRESSURE: 134 MMHG | DIASTOLIC BLOOD PRESSURE: 74 MMHG | HEART RATE: 68 BPM | OXYGEN SATURATION: 98 %

## 2023-06-07 DIAGNOSIS — D50.9 IRON DEFICIENCY ANEMIA, UNSPECIFIED IRON DEFICIENCY ANEMIA TYPE: ICD-10-CM

## 2023-06-07 DIAGNOSIS — E61.1 IRON DEFICIENCY: Primary | ICD-10-CM

## 2023-06-07 PROCEDURE — 25010000002 IRON SUCROSE PER 1 MG: Performed by: INTERNAL MEDICINE

## 2023-06-07 PROCEDURE — 96374 THER/PROPH/DIAG INJ IV PUSH: CPT

## 2023-06-07 RX ORDER — DIPHENHYDRAMINE HCL 25 MG
25 CAPSULE ORAL ONCE
Status: CANCELLED | OUTPATIENT
Start: 2023-06-08

## 2023-06-07 RX ORDER — ACETAMINOPHEN 325 MG/1
650 TABLET ORAL ONCE
Status: DISCONTINUED | OUTPATIENT
Start: 2023-06-07 | End: 2023-06-09 | Stop reason: HOSPADM

## 2023-06-07 RX ORDER — FAMOTIDINE 20 MG/1
20 TABLET, FILM COATED ORAL ONCE
Status: CANCELLED | OUTPATIENT
Start: 2023-06-08

## 2023-06-07 RX ORDER — FAMOTIDINE 10 MG/ML
20 INJECTION, SOLUTION INTRAVENOUS AS NEEDED
Status: CANCELLED | OUTPATIENT
Start: 2023-06-08

## 2023-06-07 RX ORDER — FAMOTIDINE 20 MG/1
20 TABLET, FILM COATED ORAL ONCE
Status: DISCONTINUED | OUTPATIENT
Start: 2023-06-07 | End: 2023-06-09 | Stop reason: HOSPADM

## 2023-06-07 RX ORDER — DIPHENHYDRAMINE HYDROCHLORIDE 50 MG/ML
50 INJECTION INTRAMUSCULAR; INTRAVENOUS AS NEEDED
Status: CANCELLED | OUTPATIENT
Start: 2023-06-08

## 2023-06-07 RX ORDER — SODIUM CHLORIDE 9 MG/ML
250 INJECTION, SOLUTION INTRAVENOUS ONCE
Status: CANCELLED | OUTPATIENT
Start: 2023-06-08

## 2023-06-07 RX ORDER — ACETAMINOPHEN 325 MG/1
650 TABLET ORAL ONCE
Status: CANCELLED | OUTPATIENT
Start: 2023-06-08

## 2023-06-07 RX ORDER — DIPHENHYDRAMINE HCL 25 MG
25 CAPSULE ORAL ONCE
Status: DISCONTINUED | OUTPATIENT
Start: 2023-06-07 | End: 2023-06-09 | Stop reason: HOSPADM

## 2023-06-07 RX ADMIN — IRON SUCROSE 200 MG: 20 INJECTION, SOLUTION INTRAVENOUS at 14:46

## 2023-06-08 ENCOUNTER — HOSPITAL ENCOUNTER (OUTPATIENT)
Dept: INFUSION THERAPY | Facility: HOSPITAL | Age: 78
Discharge: HOME | End: 2023-06-08
Payer: MEDICARE

## 2023-06-08 VITALS
OXYGEN SATURATION: 98 % | HEART RATE: 70 BPM | DIASTOLIC BLOOD PRESSURE: 60 MMHG | TEMPERATURE: 98.2 F | SYSTOLIC BLOOD PRESSURE: 121 MMHG | RESPIRATION RATE: 20 BRPM

## 2023-06-08 DIAGNOSIS — D50.9 IRON DEFICIENCY ANEMIA, UNSPECIFIED IRON DEFICIENCY ANEMIA TYPE: ICD-10-CM

## 2023-06-08 DIAGNOSIS — E61.1 IRON DEFICIENCY: Primary | ICD-10-CM

## 2023-06-08 PROCEDURE — 25010000002 IRON SUCROSE PER 1 MG: Performed by: INTERNAL MEDICINE

## 2023-06-08 PROCEDURE — 96374 THER/PROPH/DIAG INJ IV PUSH: CPT

## 2023-06-08 RX ORDER — DIPHENHYDRAMINE HCL 25 MG
25 CAPSULE ORAL ONCE
Status: DISCONTINUED | OUTPATIENT
Start: 2023-06-08 | End: 2023-06-10 | Stop reason: HOSPADM

## 2023-06-08 RX ORDER — ACETAMINOPHEN 325 MG/1
650 TABLET ORAL ONCE
Status: DISCONTINUED | OUTPATIENT
Start: 2023-06-08 | End: 2023-06-10 | Stop reason: HOSPADM

## 2023-06-08 RX ORDER — SODIUM CHLORIDE 9 MG/ML
250 INJECTION, SOLUTION INTRAVENOUS ONCE
OUTPATIENT
Start: 2023-06-09

## 2023-06-08 RX ORDER — ACETAMINOPHEN 325 MG/1
650 TABLET ORAL ONCE
OUTPATIENT
Start: 2023-06-09

## 2023-06-08 RX ORDER — FAMOTIDINE 20 MG/1
20 TABLET, FILM COATED ORAL ONCE
Status: DISCONTINUED | OUTPATIENT
Start: 2023-06-08 | End: 2023-06-10 | Stop reason: HOSPADM

## 2023-06-08 RX ORDER — DIPHENHYDRAMINE HCL 25 MG
25 CAPSULE ORAL ONCE
OUTPATIENT
Start: 2023-06-09

## 2023-06-08 RX ORDER — FAMOTIDINE 10 MG/ML
20 INJECTION, SOLUTION INTRAVENOUS AS NEEDED
OUTPATIENT
Start: 2023-06-09

## 2023-06-08 RX ORDER — SODIUM CHLORIDE 9 MG/ML
250 INJECTION, SOLUTION INTRAVENOUS ONCE
Status: DISCONTINUED | OUTPATIENT
Start: 2023-06-08 | End: 2023-06-10 | Stop reason: HOSPADM

## 2023-06-08 RX ORDER — FAMOTIDINE 20 MG/1
20 TABLET, FILM COATED ORAL ONCE
OUTPATIENT
Start: 2023-06-09

## 2023-06-08 RX ORDER — DIPHENHYDRAMINE HYDROCHLORIDE 50 MG/ML
50 INJECTION INTRAMUSCULAR; INTRAVENOUS AS NEEDED
OUTPATIENT
Start: 2023-06-09

## 2023-06-08 RX ADMIN — IRON SUCROSE 200 MG: 20 INJECTION, SOLUTION INTRAVENOUS at 16:00

## 2023-06-13 ENCOUNTER — HOSPITAL ENCOUNTER (OUTPATIENT)
Dept: INFUSION THERAPY | Facility: HOSPITAL | Age: 78
Discharge: HOME | End: 2023-06-13
Admitting: INTERNAL MEDICINE
Payer: MEDICARE

## 2023-06-13 VITALS
HEART RATE: 66 BPM | OXYGEN SATURATION: 99 % | WEIGHT: 163.8 LBS | SYSTOLIC BLOOD PRESSURE: 101 MMHG | DIASTOLIC BLOOD PRESSURE: 77 MMHG | BODY MASS INDEX: 33.02 KG/M2 | RESPIRATION RATE: 18 BRPM | HEIGHT: 59 IN | TEMPERATURE: 97.9 F

## 2023-06-13 DIAGNOSIS — D50.9 IRON DEFICIENCY ANEMIA, UNSPECIFIED IRON DEFICIENCY ANEMIA TYPE: ICD-10-CM

## 2023-06-13 DIAGNOSIS — E61.1 IRON DEFICIENCY: Primary | ICD-10-CM

## 2023-06-13 PROCEDURE — 96374 THER/PROPH/DIAG INJ IV PUSH: CPT

## 2023-06-13 PROCEDURE — 25010000002 IRON SUCROSE PER 1 MG: Performed by: INTERNAL MEDICINE

## 2023-06-13 RX ORDER — FAMOTIDINE 20 MG/1
20 TABLET, FILM COATED ORAL ONCE
Status: CANCELLED | OUTPATIENT
Start: 2023-06-14

## 2023-06-13 RX ORDER — ACETAMINOPHEN 325 MG/1
650 TABLET ORAL ONCE
Status: CANCELLED | OUTPATIENT
Start: 2023-06-14

## 2023-06-13 RX ORDER — SODIUM CHLORIDE 9 MG/ML
250 INJECTION, SOLUTION INTRAVENOUS ONCE
OUTPATIENT
Start: 2023-06-14

## 2023-06-13 RX ORDER — ACETAMINOPHEN 325 MG/1
650 TABLET ORAL ONCE
Status: DISCONTINUED | OUTPATIENT
Start: 2023-06-13 | End: 2023-06-15 | Stop reason: HOSPADM

## 2023-06-13 RX ORDER — DIPHENHYDRAMINE HCL 25 MG
25 CAPSULE ORAL ONCE
Status: DISCONTINUED | OUTPATIENT
Start: 2023-06-13 | End: 2023-06-15 | Stop reason: HOSPADM

## 2023-06-13 RX ORDER — DIPHENHYDRAMINE HYDROCHLORIDE 50 MG/ML
50 INJECTION INTRAMUSCULAR; INTRAVENOUS AS NEEDED
OUTPATIENT
Start: 2023-06-14

## 2023-06-13 RX ORDER — DIPHENHYDRAMINE HCL 25 MG
25 CAPSULE ORAL ONCE
Status: CANCELLED | OUTPATIENT
Start: 2023-06-14

## 2023-06-13 RX ORDER — FAMOTIDINE 10 MG/ML
20 INJECTION, SOLUTION INTRAVENOUS AS NEEDED
OUTPATIENT
Start: 2023-06-14

## 2023-06-13 RX ORDER — FAMOTIDINE 20 MG/1
20 TABLET, FILM COATED ORAL ONCE
Status: DISCONTINUED | OUTPATIENT
Start: 2023-06-13 | End: 2023-06-15 | Stop reason: HOSPADM

## 2023-06-13 RX ADMIN — IRON SUCROSE 200 MG: 20 INJECTION, SOLUTION INTRAVENOUS at 15:04

## 2023-07-31 ENCOUNTER — APPOINTMENT (OUTPATIENT)
Dept: CT IMAGING | Facility: HOSPITAL | Age: 78
End: 2023-07-31
Payer: MEDICARE

## 2023-07-31 ENCOUNTER — APPOINTMENT (OUTPATIENT)
Dept: GENERAL RADIOLOGY | Facility: HOSPITAL | Age: 78
End: 2023-07-31
Payer: MEDICARE

## 2023-07-31 ENCOUNTER — HOSPITAL ENCOUNTER (EMERGENCY)
Facility: HOSPITAL | Age: 78
Discharge: HOME | End: 2023-07-31
Attending: EMERGENCY MEDICINE | Admitting: EMERGENCY MEDICINE
Payer: MEDICARE

## 2023-07-31 VITALS
TEMPERATURE: 98.5 F | WEIGHT: 160.27 LBS | DIASTOLIC BLOOD PRESSURE: 77 MMHG | HEART RATE: 96 BPM | BODY MASS INDEX: 32.31 KG/M2 | OXYGEN SATURATION: 95 % | HEIGHT: 59 IN | RESPIRATION RATE: 20 BRPM | SYSTOLIC BLOOD PRESSURE: 142 MMHG

## 2023-07-31 DIAGNOSIS — J18.9 PNEUMONIA DUE TO INFECTIOUS ORGANISM, UNSPECIFIED LATERALITY, UNSPECIFIED PART OF LUNG: Primary | ICD-10-CM

## 2023-07-31 DIAGNOSIS — N30.00 ACUTE CYSTITIS WITHOUT HEMATURIA: ICD-10-CM

## 2023-07-31 LAB
ALBUMIN SERPL-MCNC: 3.7 G/DL (ref 3.5–5.2)
ALBUMIN/GLOB SERPL: 1.1 G/DL
ALP SERPL-CCNC: 61 U/L (ref 39–117)
ALT SERPL W P-5'-P-CCNC: 14 U/L (ref 1–33)
ANION GAP SERPL CALCULATED.3IONS-SCNC: 10.7 MMOL/L (ref 5–15)
AST SERPL-CCNC: 29 U/L (ref 1–32)
BACTERIA UR QL AUTO: ABNORMAL /HPF
BASOPHILS # BLD AUTO: 0.02 10*3/MM3 (ref 0–0.2)
BASOPHILS NFR BLD AUTO: 0.2 % (ref 0–1.5)
BILIRUB SERPL-MCNC: 0.4 MG/DL (ref 0–1.2)
BILIRUB UR QL STRIP: ABNORMAL
BUN SERPL-MCNC: 12 MG/DL (ref 8–23)
BUN/CREAT SERPL: 15.8 (ref 7–25)
CALCIUM SPEC-SCNC: 9 MG/DL (ref 8.6–10.5)
CHLORIDE SERPL-SCNC: 101 MMOL/L (ref 98–107)
CLARITY UR: ABNORMAL
CO2 SERPL-SCNC: 26.3 MMOL/L (ref 22–29)
COLOR UR: ABNORMAL
CREAT SERPL-MCNC: 0.76 MG/DL (ref 0.57–1)
DEPRECATED RDW RBC AUTO: 63.2 FL (ref 37–54)
EGFRCR SERPLBLD CKD-EPI 2021: 79.8 ML/MIN/1.73
EOSINOPHIL # BLD AUTO: 0.05 10*3/MM3 (ref 0–0.4)
EOSINOPHIL NFR BLD AUTO: 0.6 % (ref 0.3–6.2)
ERYTHROCYTE [DISTWIDTH] IN BLOOD BY AUTOMATED COUNT: 20.2 % (ref 12.3–15.4)
GLOBULIN UR ELPH-MCNC: 3.5 GM/DL
GLUCOSE SERPL-MCNC: 105 MG/DL (ref 65–99)
GLUCOSE UR STRIP-MCNC: NEGATIVE MG/DL
HCT VFR BLD AUTO: 39.5 % (ref 34–46.6)
HGB BLD-MCNC: 12.8 G/DL (ref 12–15.9)
HGB UR QL STRIP.AUTO: ABNORMAL
HYALINE CASTS UR QL AUTO: ABNORMAL /LPF
IMM GRANULOCYTES # BLD AUTO: 0.03 10*3/MM3 (ref 0–0.05)
IMM GRANULOCYTES NFR BLD AUTO: 0.3 % (ref 0–0.5)
KETONES UR QL STRIP: ABNORMAL
LEUKOCYTE ESTERASE UR QL STRIP.AUTO: ABNORMAL
LYMPHOCYTES # BLD AUTO: 1.63 10*3/MM3 (ref 0.7–3.1)
LYMPHOCYTES NFR BLD AUTO: 18.5 % (ref 19.6–45.3)
MCH RBC QN AUTO: 28 PG (ref 26.6–33)
MCHC RBC AUTO-ENTMCNC: 32.4 G/DL (ref 31.5–35.7)
MCV RBC AUTO: 86.4 FL (ref 79–97)
MONOCYTES # BLD AUTO: 0.79 10*3/MM3 (ref 0.1–0.9)
MONOCYTES NFR BLD AUTO: 9 % (ref 5–12)
NEUTROPHILS NFR BLD AUTO: 6.29 10*3/MM3 (ref 1.7–7)
NEUTROPHILS NFR BLD AUTO: 71.4 % (ref 42.7–76)
NITRITE UR QL STRIP: POSITIVE
NRBC BLD AUTO-RTO: 0 /100 WBC (ref 0–0.2)
PH UR STRIP.AUTO: 5.5 [PH] (ref 5–8)
PLATELET # BLD AUTO: 183 10*3/MM3 (ref 140–450)
PMV BLD AUTO: 9.1 FL (ref 6–12)
POTASSIUM SERPL-SCNC: 4.1 MMOL/L (ref 3.5–5.2)
PROT SERPL-MCNC: 7.2 G/DL (ref 6–8.5)
PROT UR QL STRIP: ABNORMAL
RBC # BLD AUTO: 4.57 10*6/MM3 (ref 3.77–5.28)
RBC # UR STRIP: ABNORMAL /HPF
REF LAB TEST METHOD: ABNORMAL
SODIUM SERPL-SCNC: 138 MMOL/L (ref 136–145)
SP GR UR STRIP: >=1.03 (ref 1–1.03)
SQUAMOUS #/AREA URNS HPF: ABNORMAL /HPF
UROBILINOGEN UR QL STRIP: ABNORMAL
WBC # UR STRIP: ABNORMAL /HPF
WBC NRBC COR # BLD: 8.81 10*3/MM3 (ref 3.4–10.8)

## 2023-07-31 PROCEDURE — 36415 COLL VENOUS BLD VENIPUNCTURE: CPT

## 2023-07-31 PROCEDURE — 85025 COMPLETE CBC W/AUTO DIFF WBC: CPT

## 2023-07-31 PROCEDURE — 99284 EMERGENCY DEPT VISIT MOD MDM: CPT

## 2023-07-31 PROCEDURE — 70450 CT HEAD/BRAIN W/O DYE: CPT

## 2023-07-31 PROCEDURE — 87086 URINE CULTURE/COLONY COUNT: CPT

## 2023-07-31 PROCEDURE — 25010000002 CEFTRIAXONE PER 250 MG

## 2023-07-31 PROCEDURE — 87040 BLOOD CULTURE FOR BACTERIA: CPT

## 2023-07-31 PROCEDURE — 71045 X-RAY EXAM CHEST 1 VIEW: CPT

## 2023-07-31 PROCEDURE — 96365 THER/PROPH/DIAG IV INF INIT: CPT

## 2023-07-31 PROCEDURE — 81001 URINALYSIS AUTO W/SCOPE: CPT

## 2023-07-31 PROCEDURE — 80053 COMPREHEN METABOLIC PANEL: CPT

## 2023-07-31 RX ORDER — LEVOFLOXACIN 750 MG/1
750 TABLET ORAL DAILY
Qty: 5 TABLET | Refills: 0 | Status: SHIPPED | OUTPATIENT
Start: 2023-07-31 | End: 2023-08-05

## 2023-07-31 RX ORDER — ACETAMINOPHEN 325 MG/1
975 TABLET ORAL ONCE
Status: COMPLETED | OUTPATIENT
Start: 2023-07-31 | End: 2023-07-31

## 2023-07-31 RX ORDER — CEFTRIAXONE SODIUM 1 G/50ML
1000 INJECTION, SOLUTION INTRAVENOUS ONCE
Status: COMPLETED | OUTPATIENT
Start: 2023-07-31 | End: 2023-07-31

## 2023-07-31 RX ADMIN — CEFTRIAXONE SODIUM 1000 MG: 1 INJECTION, SOLUTION INTRAVENOUS at 22:06

## 2023-07-31 RX ADMIN — ACETAMINOPHEN 975 MG: 325 TABLET ORAL at 18:47

## 2023-08-01 LAB — BACTERIA SPEC AEROBE CULT: NO GROWTH

## 2023-08-05 LAB
BACTERIA SPEC AEROBE CULT: NORMAL
BACTERIA SPEC AEROBE CULT: NORMAL

## 2023-09-12 ENCOUNTER — TELEPHONE (OUTPATIENT)
Dept: INTERNAL MEDICINE | Facility: CLINIC | Age: 78
End: 2023-09-12
Payer: MEDICARE

## 2023-09-28 ENCOUNTER — LAB (OUTPATIENT)
Dept: LAB | Facility: HOSPITAL | Age: 78
End: 2023-09-28
Payer: MEDICARE

## 2023-09-28 DIAGNOSIS — E11.40 TYPE 2 DIABETES MELLITUS WITH DIABETIC NEUROPATHY, WITHOUT LONG-TERM CURRENT USE OF INSULIN: ICD-10-CM

## 2023-09-28 DIAGNOSIS — E66.9 OBESITY (BMI 30-39.9): ICD-10-CM

## 2023-09-28 DIAGNOSIS — M48.062 SPINAL STENOSIS OF LUMBAR REGION WITH NEUROGENIC CLAUDICATION: ICD-10-CM

## 2023-09-28 DIAGNOSIS — E55.9 VITAMIN D DEFICIENCY: ICD-10-CM

## 2023-09-28 DIAGNOSIS — E78.2 MIXED HYPERLIPIDEMIA: ICD-10-CM

## 2023-09-28 DIAGNOSIS — D50.9 IRON DEFICIENCY ANEMIA, UNSPECIFIED IRON DEFICIENCY ANEMIA TYPE: ICD-10-CM

## 2023-09-28 DIAGNOSIS — M54.50 CHRONIC BILATERAL LOW BACK PAIN WITHOUT SCIATICA: ICD-10-CM

## 2023-09-28 DIAGNOSIS — R53.83 OTHER FATIGUE: ICD-10-CM

## 2023-09-28 DIAGNOSIS — G25.81 RESTLESS LEG SYNDROME: ICD-10-CM

## 2023-09-28 DIAGNOSIS — Z79.899 MEDICATION MANAGEMENT: ICD-10-CM

## 2023-09-28 DIAGNOSIS — I10 ESSENTIAL HYPERTENSION: ICD-10-CM

## 2023-09-28 DIAGNOSIS — G89.29 CHRONIC BILATERAL LOW BACK PAIN WITHOUT SCIATICA: ICD-10-CM

## 2023-09-28 LAB
ALBUMIN SERPL-MCNC: 4.2 G/DL (ref 3.5–5.2)
ALBUMIN UR-MCNC: 5.6 MG/DL
ALBUMIN/GLOB SERPL: 1.7 G/DL
ALP SERPL-CCNC: 55 U/L (ref 39–117)
ALT SERPL W P-5'-P-CCNC: 13 U/L (ref 1–33)
ANION GAP SERPL CALCULATED.3IONS-SCNC: 12.1 MMOL/L (ref 5–15)
AST SERPL-CCNC: 26 U/L (ref 1–32)
BASOPHILS # BLD AUTO: 0.01 10*3/MM3 (ref 0–0.2)
BASOPHILS NFR BLD AUTO: 0.2 % (ref 0–1.5)
BILIRUB SERPL-MCNC: 0.4 MG/DL (ref 0–1.2)
BUN SERPL-MCNC: 7 MG/DL (ref 8–23)
BUN/CREAT SERPL: 9.3 (ref 7–25)
CALCIUM SPEC-SCNC: 9.4 MG/DL (ref 8.6–10.5)
CHLORIDE SERPL-SCNC: 101 MMOL/L (ref 98–107)
CHOLEST SERPL-MCNC: 178 MG/DL (ref 0–200)
CO2 SERPL-SCNC: 25.9 MMOL/L (ref 22–29)
CREAT SERPL-MCNC: 0.75 MG/DL (ref 0.57–1)
CREAT UR-MCNC: 50.3 MG/DL
DEPRECATED RDW RBC AUTO: 53.1 FL (ref 37–54)
EGFRCR SERPLBLD CKD-EPI 2021: 80.6 ML/MIN/1.73
EOSINOPHIL # BLD AUTO: 0.07 10*3/MM3 (ref 0–0.4)
EOSINOPHIL NFR BLD AUTO: 1.4 % (ref 0.3–6.2)
ERYTHROCYTE [DISTWIDTH] IN BLOOD BY AUTOMATED COUNT: 16.9 % (ref 12.3–15.4)
GLOBULIN UR ELPH-MCNC: 2.5 GM/DL
GLUCOSE SERPL-MCNC: 79 MG/DL (ref 65–99)
HBA1C MFR BLD: 6.1 % (ref 4.8–5.6)
HCT VFR BLD AUTO: 43.8 % (ref 34–46.6)
HDLC SERPL-MCNC: 36 MG/DL (ref 40–60)
HGB BLD-MCNC: 14.7 G/DL (ref 12–15.9)
IMM GRANULOCYTES # BLD AUTO: 0.02 10*3/MM3 (ref 0–0.05)
IMM GRANULOCYTES NFR BLD AUTO: 0.4 % (ref 0–0.5)
IRON 24H UR-MRATE: 78 MCG/DL (ref 37–145)
IRON SATN MFR SERPL: 19 % (ref 20–50)
LDLC SERPL CALC-MCNC: 117 MG/DL (ref 0–100)
LDLC/HDLC SERPL: 3.19 {RATIO}
LYMPHOCYTES # BLD AUTO: 2.15 10*3/MM3 (ref 0.7–3.1)
LYMPHOCYTES NFR BLD AUTO: 41.6 % (ref 19.6–45.3)
MAGNESIUM SERPL-MCNC: 2.3 MG/DL (ref 1.6–2.4)
MCH RBC QN AUTO: 29.2 PG (ref 26.6–33)
MCHC RBC AUTO-ENTMCNC: 33.6 G/DL (ref 31.5–35.7)
MCV RBC AUTO: 86.9 FL (ref 79–97)
MICROALBUMIN/CREAT UR: 111.3 MG/G
MONOCYTES # BLD AUTO: 0.42 10*3/MM3 (ref 0.1–0.9)
MONOCYTES NFR BLD AUTO: 8.1 % (ref 5–12)
NEUTROPHILS NFR BLD AUTO: 2.5 10*3/MM3 (ref 1.7–7)
NEUTROPHILS NFR BLD AUTO: 48.3 % (ref 42.7–76)
NRBC BLD AUTO-RTO: 0 /100 WBC (ref 0–0.2)
PLATELET # BLD AUTO: 195 10*3/MM3 (ref 140–450)
PMV BLD AUTO: 9.8 FL (ref 6–12)
POTASSIUM SERPL-SCNC: 4.5 MMOL/L (ref 3.5–5.2)
PROT SERPL-MCNC: 6.7 G/DL (ref 6–8.5)
RBC # BLD AUTO: 5.04 10*6/MM3 (ref 3.77–5.28)
SODIUM SERPL-SCNC: 139 MMOL/L (ref 136–145)
TIBC SERPL-MCNC: 416 MCG/DL (ref 298–536)
TRANSFERRIN SERPL-MCNC: 279 MG/DL (ref 200–360)
TRIGL SERPL-MCNC: 136 MG/DL (ref 0–150)
VLDLC SERPL-MCNC: 25 MG/DL (ref 5–40)
WBC NRBC COR # BLD: 5.17 10*3/MM3 (ref 3.4–10.8)

## 2023-09-28 PROCEDURE — 84466 ASSAY OF TRANSFERRIN: CPT

## 2023-09-28 PROCEDURE — 80053 COMPREHEN METABOLIC PANEL: CPT

## 2023-09-28 PROCEDURE — 80061 LIPID PANEL: CPT

## 2023-09-28 PROCEDURE — 80307 DRUG TEST PRSMV CHEM ANLYZR: CPT

## 2023-09-28 PROCEDURE — 83540 ASSAY OF IRON: CPT

## 2023-09-28 PROCEDURE — 82043 UR ALBUMIN QUANTITATIVE: CPT

## 2023-09-28 PROCEDURE — 82570 ASSAY OF URINE CREATININE: CPT

## 2023-09-28 PROCEDURE — 83735 ASSAY OF MAGNESIUM: CPT

## 2023-09-28 PROCEDURE — 86803 HEPATITIS C AB TEST: CPT

## 2023-09-28 PROCEDURE — 83036 HEMOGLOBIN GLYCOSYLATED A1C: CPT

## 2023-09-28 PROCEDURE — 36415 COLL VENOUS BLD VENIPUNCTURE: CPT

## 2023-09-28 PROCEDURE — 85025 COMPLETE CBC W/AUTO DIFF WBC: CPT

## 2023-09-29 ENCOUNTER — OFFICE VISIT (OUTPATIENT)
Dept: INTERNAL MEDICINE | Facility: CLINIC | Age: 78
End: 2023-09-29
Payer: MEDICARE

## 2023-09-29 VITALS
HEART RATE: 84 BPM | BODY MASS INDEX: 31.25 KG/M2 | OXYGEN SATURATION: 96 % | WEIGHT: 155 LBS | HEIGHT: 59 IN | SYSTOLIC BLOOD PRESSURE: 160 MMHG | DIASTOLIC BLOOD PRESSURE: 82 MMHG | TEMPERATURE: 98 F

## 2023-09-29 DIAGNOSIS — Z80.0 FAMILY HISTORY OF COLON CANCER: ICD-10-CM

## 2023-09-29 DIAGNOSIS — E11.40 TYPE 2 DIABETES MELLITUS WITH DIABETIC NEUROPATHY, WITHOUT LONG-TERM CURRENT USE OF INSULIN: ICD-10-CM

## 2023-09-29 DIAGNOSIS — M48.062 SPINAL STENOSIS OF LUMBAR REGION WITH NEUROGENIC CLAUDICATION: ICD-10-CM

## 2023-09-29 DIAGNOSIS — I50.32 CHRONIC DIASTOLIC CONGESTIVE HEART FAILURE: ICD-10-CM

## 2023-09-29 DIAGNOSIS — E66.9 OBESITY (BMI 30-39.9): ICD-10-CM

## 2023-09-29 DIAGNOSIS — E78.2 MIXED HYPERLIPIDEMIA: ICD-10-CM

## 2023-09-29 DIAGNOSIS — G89.4 CHRONIC PAIN SYNDROME: ICD-10-CM

## 2023-09-29 DIAGNOSIS — Z00.00 MEDICARE ANNUAL WELLNESS VISIT, SUBSEQUENT: ICD-10-CM

## 2023-09-29 DIAGNOSIS — G25.81 RESTLESS LEG SYNDROME: ICD-10-CM

## 2023-09-29 DIAGNOSIS — E11.9 DIABETES MELLITUS WITH HEMOGLOBIN A1C GOAL OF 7.0%-8.0%: ICD-10-CM

## 2023-09-29 DIAGNOSIS — Z79.899 MEDICATION MANAGEMENT: Primary | ICD-10-CM

## 2023-09-29 DIAGNOSIS — E55.9 VITAMIN D DEFICIENCY: ICD-10-CM

## 2023-09-29 DIAGNOSIS — D50.9 IRON DEFICIENCY ANEMIA, UNSPECIFIED IRON DEFICIENCY ANEMIA TYPE: ICD-10-CM

## 2023-09-29 DIAGNOSIS — F32.A DEPRESSION, UNSPECIFIED DEPRESSION TYPE: ICD-10-CM

## 2023-09-29 DIAGNOSIS — I10 ESSENTIAL HYPERTENSION: ICD-10-CM

## 2023-09-29 LAB
AMPHET+METHAMPHET UR QL: NEGATIVE
BARBITURATES UR QL SCN: NEGATIVE
BENZODIAZ UR QL SCN: NEGATIVE
CANNABINOIDS SERPL QL: NEGATIVE
COCAINE UR QL: NEGATIVE
FENTANYL UR-MCNC: NEGATIVE NG/ML
HCV AB SER DONR QL: NORMAL
METHADONE UR QL SCN: NEGATIVE
OPIATES UR QL: NEGATIVE
OXYCODONE UR QL SCN: NEGATIVE

## 2023-09-29 RX ORDER — ROSUVASTATIN CALCIUM 20 MG/1
20 TABLET, COATED ORAL DAILY
Qty: 90 TABLET | Refills: 3 | Status: SHIPPED | OUTPATIENT
Start: 2023-09-29

## 2023-09-29 RX ORDER — HYDROCODONE BITARTRATE AND ACETAMINOPHEN 5; 325 MG/1; MG/1
1 TABLET ORAL EVERY 6 HOURS PRN
Qty: 25 TABLET | Refills: 0 | Status: SHIPPED | OUTPATIENT
Start: 2023-09-29

## 2023-09-29 NOTE — PROGRESS NOTES
"CHIEF COMPLAINT/ HPI:  Hyperlipidemia (Routine follow up, Lab follow up, Medication request for pain - has chronic back pain. Has a upcoming trip with family and would like something to help with pain. )    Patient has chronic back pain is going on a trip as above, needs something for relief,          Objective   Vital Signs  Vitals:    09/29/23 0851   BP: 160/82   Pulse: 84   Temp: 98 °F (36.7 °C)   SpO2: 96%   Weight: 70.3 kg (155 lb)   Height: 149.9 cm (59.02\")      Body mass index is 31.29 kg/m².  Review of Systems   Constitutional: Negative.    HENT: Negative.     Eyes: Negative.    Respiratory: Negative.     Cardiovascular: Negative.    Gastrointestinal: Negative.    Endocrine: Negative.    Genitourinary: Negative.    Musculoskeletal: Negative.    Allergic/Immunologic: Negative.    Neurological: Negative.    Hematological: Negative.    Psychiatric/Behavioral: Negative.      Physical Exam  Constitutional:       General: She is not in acute distress.     Appearance: Normal appearance. She is obese.   HENT:      Head: Normocephalic.      Mouth/Throat:      Mouth: Mucous membranes are moist.   Eyes:      Conjunctiva/sclera: Conjunctivae normal.      Pupils: Pupils are equal, round, and reactive to light.   Cardiovascular:      Rate and Rhythm: Normal rate and regular rhythm. Rhythm irregular.      Pulses: Normal pulses.      Heart sounds: Normal heart sounds. Murmur heard.   Pulmonary:      Effort: Pulmonary effort is normal.      Breath sounds: Normal breath sounds.   Abdominal:      General: Bowel sounds are normal.      Palpations: Abdomen is soft.   Musculoskeletal:         General: No swelling. Normal range of motion.      Cervical back: Neck supple.      Right lower leg: Edema present.      Left lower leg: Edema (Trace to 1+ ankle edema bilateral,) present.   Skin:     General: Skin is warm and dry.      Coloration: Skin is not jaundiced.   Neurological:      General: No focal deficit present.      Mental " Status: She is alert and oriented to person, place, and time. Mental status is at baseline.   Psychiatric:         Mood and Affect: Mood normal.         Behavior: Behavior normal.         Thought Content: Thought content normal.         Judgment: Judgment normal.      Result Review :   Lab Results   Component Value Date    PROBNP 89.7 03/21/2023    PROBNP 90.1 05/25/2022    PROBNP 44.6 10/11/2021    BNP 80 05/20/2019     05/16/2019    BNP 4275 (H) 05/12/2019     CMP          5/16/2023    09:41 7/31/2023    18:02 9/28/2023    10:55   CMP   Glucose 89  105  79    BUN 11  12  7    Creatinine 0.98  0.76  0.75    EGFR 58.8  79.8  80.6    Sodium 140  138  139    Potassium 3.8  4.1  4.5    Chloride 103  101  101    Calcium 8.3  9.0  9.4    Total Protein 6.8  7.2  6.7    Albumin 4.1  3.7  4.2    Globulin 2.7  3.5  2.5    Total Bilirubin 0.2  0.4  0.4    Alkaline Phosphatase 59  61  55    AST (SGOT) 34  29  26    ALT (SGPT) 12  14  13    Albumin/Globulin Ratio 1.5  1.1  1.7    BUN/Creatinine Ratio 11.2  15.8  9.3    Anion Gap 9.0  10.7  12.1      CBC w/diff          5/16/2023    09:41 7/31/2023    18:02 9/28/2023    10:55   CBC w/Diff   WBC 4.46  8.81  5.17    RBC 3.84  4.57  5.04    Hemoglobin 9.9  12.8  14.7    Hematocrit 30.4  39.5  43.8    MCV 79.2  86.4  86.9    MCH 25.8  28.0  29.2    MCHC 32.6  32.4  33.6    RDW 14.1  20.2  16.9    Platelets 246  183  195    Neutrophil Rel % 52.5  71.4  48.3    Immature Granulocyte Rel % 0.4  0.3  0.4    Lymphocyte Rel % 34.3  18.5  41.6    Monocyte Rel % 11.0  9.0  8.1    Eosinophil Rel % 1.8  0.6  1.4    Basophil Rel % 0.0  0.2  0.2       Lipid Panel          3/21/2023    09:35 9/28/2023    10:55   Lipid Panel   Total Cholesterol 136  178    Triglycerides 101  136    HDL Cholesterol 47  36    VLDL Cholesterol 19  25    LDL Cholesterol  70  117    LDL/HDL Ratio 1.46  3.19       Lab Results   Component Value Date    TSH 1.860 04/14/2023    TSH 2.030 05/25/2022    TSH 1.650  10/11/2021      Lab Results   Component Value Date    FREET4 1.08 05/25/2022    FREET4 1.4 05/10/2019      A1C Last 3 Results          11/28/2022    11:24 3/21/2023    09:35 9/28/2023    10:55   HGBA1C Last 3 Results   Hemoglobin A1C 5.60  5.80  6.10                        Visit Diagnoses:    ICD-10-CM ICD-9-CM   1. Medicare annual wellness visit, subsequent  Z00.00 V70.0   2. Family history of colon cancer  Z80.0 V16.0   3. Diabetes mellitus with hemoglobin A1c goal of 7.0%-8.0%  E11.9 250.00   4. Essential hypertension  I10 401.9   5. Spinal stenosis of lumbar region with neurogenic claudication  M48.062 724.03   6. Iron deficiency anemia, unspecified iron deficiency anemia type  D50.9 280.9   7. Restless leg syndrome  G25.81 333.94   8. Mixed hyperlipidemia  E78.2 272.2   9. Chronic diastolic congestive heart failure  I50.32 428.32     428.0   10. Obesity (BMI 30-39.9)  E66.9 278.00   11. Type 2 diabetes mellitus with diabetic neuropathy, without long-term current use of insulin  E11.40 250.60     357.2   12. Vitamin D deficiency  E55.9 268.9   13. Depression, unspecified depression type  F32.A 311   14. Chronic pain syndrome  G89.4 338.4       Assessment and Plan   Diagnoses and all orders for this visit:    1. Medicare annual wellness visit, subsequent (Primary)  -     CBC & Differential; Future  -     Comprehensive Metabolic Panel; Future  -     Hemoglobin A1c; Future  -     rosuvastatin (Crestor) 20 MG tablet; Take 1 tablet by mouth Daily.  Dispense: 90 tablet; Refill: 3  -     Lipid Panel; Future  -     Hemoglobin A1c; Future  -     Vitamin D,25-Hydroxy; Future  -     Vitamin B12 anemia; Future  -     Folate anemia; Future  -     HYDROcodone-acetaminophen (Norco) 5-325 MG per tablet; Take 1 tablet by mouth Every 6 (Six) Hours As Needed for Moderate Pain.  Dispense: 25 tablet; Refill: 0    2. Family history of colon cancer  -     CBC & Differential; Future  -     Comprehensive Metabolic Panel; Future  -      Hemoglobin A1c; Future  -     rosuvastatin (Crestor) 20 MG tablet; Take 1 tablet by mouth Daily.  Dispense: 90 tablet; Refill: 3  -     Lipid Panel; Future  -     Hemoglobin A1c; Future  -     Vitamin D,25-Hydroxy; Future  -     Vitamin B12 anemia; Future  -     Folate anemia; Future  -     HYDROcodone-acetaminophen (Norco) 5-325 MG per tablet; Take 1 tablet by mouth Every 6 (Six) Hours As Needed for Moderate Pain.  Dispense: 25 tablet; Refill: 0    3. Diabetes mellitus with hemoglobin A1c goal of 7.0%-8.0%  -     CBC & Differential; Future  -     Comprehensive Metabolic Panel; Future  -     Hemoglobin A1c; Future  -     rosuvastatin (Crestor) 20 MG tablet; Take 1 tablet by mouth Daily.  Dispense: 90 tablet; Refill: 3  -     Lipid Panel; Future  -     Hemoglobin A1c; Future  -     Vitamin D,25-Hydroxy; Future  -     Vitamin B12 anemia; Future  -     Folate anemia; Future  -     HYDROcodone-acetaminophen (Norco) 5-325 MG per tablet; Take 1 tablet by mouth Every 6 (Six) Hours As Needed for Moderate Pain.  Dispense: 25 tablet; Refill: 0    4. Essential hypertension  -     CBC & Differential; Future  -     Comprehensive Metabolic Panel; Future  -     Hemoglobin A1c; Future  -     rosuvastatin (Crestor) 20 MG tablet; Take 1 tablet by mouth Daily.  Dispense: 90 tablet; Refill: 3  -     Lipid Panel; Future  -     Hemoglobin A1c; Future  -     Vitamin D,25-Hydroxy; Future  -     Vitamin B12 anemia; Future  -     Folate anemia; Future  -     HYDROcodone-acetaminophen (Norco) 5-325 MG per tablet; Take 1 tablet by mouth Every 6 (Six) Hours As Needed for Moderate Pain.  Dispense: 25 tablet; Refill: 0    5. Spinal stenosis of lumbar region with neurogenic claudication  -     CBC & Differential; Future  -     Comprehensive Metabolic Panel; Future  -     Hemoglobin A1c; Future  -     rosuvastatin (Crestor) 20 MG tablet; Take 1 tablet by mouth Daily.  Dispense: 90 tablet; Refill: 3  -     Lipid Panel; Future  -     Hemoglobin A1c;  Future  -     Vitamin D,25-Hydroxy; Future  -     Vitamin B12 anemia; Future  -     Folate anemia; Future  -     HYDROcodone-acetaminophen (Norco) 5-325 MG per tablet; Take 1 tablet by mouth Every 6 (Six) Hours As Needed for Moderate Pain.  Dispense: 25 tablet; Refill: 0    6. Iron deficiency anemia, unspecified iron deficiency anemia type  -     CBC & Differential; Future  -     Comprehensive Metabolic Panel; Future  -     Hemoglobin A1c; Future  -     rosuvastatin (Crestor) 20 MG tablet; Take 1 tablet by mouth Daily.  Dispense: 90 tablet; Refill: 3  -     Lipid Panel; Future  -     Hemoglobin A1c; Future  -     Vitamin D,25-Hydroxy; Future  -     Vitamin B12 anemia; Future  -     Folate anemia; Future  -     HYDROcodone-acetaminophen (Norco) 5-325 MG per tablet; Take 1 tablet by mouth Every 6 (Six) Hours As Needed for Moderate Pain.  Dispense: 25 tablet; Refill: 0    7. Restless leg syndrome  -     CBC & Differential; Future  -     Comprehensive Metabolic Panel; Future  -     Hemoglobin A1c; Future  -     rosuvastatin (Crestor) 20 MG tablet; Take 1 tablet by mouth Daily.  Dispense: 90 tablet; Refill: 3  -     Lipid Panel; Future  -     Hemoglobin A1c; Future  -     Vitamin D,25-Hydroxy; Future  -     Vitamin B12 anemia; Future  -     Folate anemia; Future  -     HYDROcodone-acetaminophen (Norco) 5-325 MG per tablet; Take 1 tablet by mouth Every 6 (Six) Hours As Needed for Moderate Pain.  Dispense: 25 tablet; Refill: 0    8. Mixed hyperlipidemia  -     CBC & Differential; Future  -     Comprehensive Metabolic Panel; Future  -     Hemoglobin A1c; Future  -     rosuvastatin (Crestor) 20 MG tablet; Take 1 tablet by mouth Daily.  Dispense: 90 tablet; Refill: 3  -     Lipid Panel; Future  -     Hemoglobin A1c; Future  -     Vitamin D,25-Hydroxy; Future  -     Vitamin B12 anemia; Future  -     Folate anemia; Future  -     HYDROcodone-acetaminophen (Norco) 5-325 MG per tablet; Take 1 tablet by mouth Every 6 (Six) Hours As  Needed for Moderate Pain.  Dispense: 25 tablet; Refill: 0    9. Chronic diastolic congestive heart failure  -     CBC & Differential; Future  -     Comprehensive Metabolic Panel; Future  -     Hemoglobin A1c; Future  -     rosuvastatin (Crestor) 20 MG tablet; Take 1 tablet by mouth Daily.  Dispense: 90 tablet; Refill: 3  -     Lipid Panel; Future  -     Hemoglobin A1c; Future  -     Vitamin D,25-Hydroxy; Future  -     Vitamin B12 anemia; Future  -     Folate anemia; Future  -     HYDROcodone-acetaminophen (Norco) 5-325 MG per tablet; Take 1 tablet by mouth Every 6 (Six) Hours As Needed for Moderate Pain.  Dispense: 25 tablet; Refill: 0    10. Obesity (BMI 30-39.9)  -     CBC & Differential; Future  -     Comprehensive Metabolic Panel; Future  -     Hemoglobin A1c; Future  -     rosuvastatin (Crestor) 20 MG tablet; Take 1 tablet by mouth Daily.  Dispense: 90 tablet; Refill: 3  -     Lipid Panel; Future  -     Hemoglobin A1c; Future  -     Vitamin D,25-Hydroxy; Future  -     Vitamin B12 anemia; Future  -     Folate anemia; Future  -     HYDROcodone-acetaminophen (Norco) 5-325 MG per tablet; Take 1 tablet by mouth Every 6 (Six) Hours As Needed for Moderate Pain.  Dispense: 25 tablet; Refill: 0    11. Type 2 diabetes mellitus with diabetic neuropathy, without long-term current use of insulin  -     CBC & Differential; Future  -     Comprehensive Metabolic Panel; Future  -     Hemoglobin A1c; Future  -     rosuvastatin (Crestor) 20 MG tablet; Take 1 tablet by mouth Daily.  Dispense: 90 tablet; Refill: 3  -     Lipid Panel; Future  -     Hemoglobin A1c; Future  -     Vitamin D,25-Hydroxy; Future  -     Vitamin B12 anemia; Future  -     Folate anemia; Future  -     HYDROcodone-acetaminophen (Norco) 5-325 MG per tablet; Take 1 tablet by mouth Every 6 (Six) Hours As Needed for Moderate Pain.  Dispense: 25 tablet; Refill: 0    12. Vitamin D deficiency  -     CBC & Differential; Future  -     Comprehensive Metabolic Panel;  Future  -     Hemoglobin A1c; Future  -     rosuvastatin (Crestor) 20 MG tablet; Take 1 tablet by mouth Daily.  Dispense: 90 tablet; Refill: 3  -     Lipid Panel; Future  -     Hemoglobin A1c; Future  -     Vitamin D,25-Hydroxy; Future  -     Vitamin B12 anemia; Future  -     Folate anemia; Future  -     HYDROcodone-acetaminophen (Norco) 5-325 MG per tablet; Take 1 tablet by mouth Every 6 (Six) Hours As Needed for Moderate Pain.  Dispense: 25 tablet; Refill: 0    13. Depression, unspecified depression type  -     CBC & Differential; Future  -     Comprehensive Metabolic Panel; Future  -     Hemoglobin A1c; Future  -     rosuvastatin (Crestor) 20 MG tablet; Take 1 tablet by mouth Daily.  Dispense: 90 tablet; Refill: 3  -     Lipid Panel; Future  -     Hemoglobin A1c; Future  -     Vitamin D,25-Hydroxy; Future  -     Vitamin B12 anemia; Future  -     Folate anemia; Future  -     HYDROcodone-acetaminophen (Norco) 5-325 MG per tablet; Take 1 tablet by mouth Every 6 (Six) Hours As Needed for Moderate Pain.  Dispense: 25 tablet; Refill: 0    14. Chronic pain syndrome  -     CBC & Differential; Future  -     Comprehensive Metabolic Panel; Future  -     Hemoglobin A1c; Future  -     rosuvastatin (Crestor) 20 MG tablet; Take 1 tablet by mouth Daily.  Dispense: 90 tablet; Refill: 3  -     Lipid Panel; Future  -     Hemoglobin A1c; Future  -     Vitamin D,25-Hydroxy; Future  -     Vitamin B12 anemia; Future  -     Folate anemia; Future  -     HYDROcodone-acetaminophen (Norco) 5-325 MG per tablet; Take 1 tablet by mouth Every 6 (Six) Hours As Needed for Moderate Pain.  Dispense: 25 tablet; Refill: 0        BMI is >= 30 and <35. (Class 1 Obesity). The following options were offered after discussion;: weight loss educational material (shared in after visit summary) and exercise counseling/recommendations     Restless legs,--continues  requip 3 mg tid and 10 mg qhs and prn hydocodone , gabapentin 800 mg twice daily ---Status post  IV iron June 6 through June 13, 2023 with Venofer     Anemia iron deficiency----- finished IV iron Venofer June 2023, current iron level normal, hemoglobin much improved at 14 September 28, 2023     Peripheral neuropathy--- continues gabapentin at night along with the change in the Requip and iron infusions as of June 29, 2023    Chronic back pain severe at times, makes her nauseated no appetite, has tried different medications pain medications injections without relief, has been to pain management in the past discussed treatment options going forward September 29, 2023 patient's been to [facility] [place] had injections done also, 2021 December we will get a send in [place] for her due to chronic pain and worsening pain and upcoming trip September 29, 2023 I sent in Norco 5/3/2025 every 4 hours as needed #25 no refills     Chest pains,  cardiac stress test August 23, 2022, ---showed normal left ventricle size ejection fraction 70% no wall motion abnormalities low risk myocardial perfusion study, with Lexiscan, echocardiogram September 9, 2022 showed normal ejection fraction 68% posterior mitral valve leaflet thickening chordal Gavino was noted, diastolic dysfunction with mild septal asymmetric hypertrophy, chest x-ray no active disease August 27, 2022     Type 2 diabetes hemoglobin A1c -6.1 September 28, 2023, patient stopped metformin, diarrhea improved, urine for microalbumin was normal September 28, 2023     left foot pain arterial evaluation March 15, 2022 shows EDEL left and right to be normal,---     Previous left total knee arthroplasty, laparoscopic cholecystectomy March 2016, previous colonoscopies polypectomies, right breast biopsy, right hip surgery, lap band surgery, ADRIANO, recent back surgery July 2018,    Mixed hyperlipidemia discussed improvement in LDL down to 70 if possible September 2023, will get a switch simvastatin to Crestor 20     Chronic pain, continue Cymbalta,     Depression anxiety,  continues Cymbalta 60 mg daily     Peripheral neuropathy lower legs,---CONT GABAPENTIN 800 MG NIGHTLY      Previous pelvic fractures     Diverticulosis and diverticulitis in September 2015     small bowel enteritis small bowel perforation--- resolved 2016     Kidney stones     Alcohol use in the past,--has not drank in greater than 4 month as of March 2023     ERCP Common bile duct stone cholelithiasis choledocholithiasis with stent placement and sphincterotomy February 29, 2016, status post laparoscopic cholecystectomy Dr. Huizar     Paroxysmal atrial fibrillation May 2019 with RVR, clinically in sinus rhythm     Hypertension, continues metoprolol XL 25 mg daily,     Obstructive sleep apnea, continue CPAP     Chronic lower extremity edema, patient has stopped taking Lasix 40 mg daily as of September 2023 ---- edema has improved in her legs,  BR NP levels are extremely low at 89, March 2023 ------ recommend compression devices rather than more diuretics given the kidney numbers and heart numbers being good, November 2022     Follow Up   No follow-ups on file.  Patient was given instructions and counseling regarding her condition or for health maintenance advice. Please see specific information pulled into the AVS if appropriate.

## 2023-09-29 NOTE — PROGRESS NOTES
The ABCs of the Annual Wellness Visit  Subsequent Medicare Wellness Visit    Subjective      Ivania Mattson is a 78 y.o. female who presents for a Subsequent Medicare Wellness Visit.    The following portions of the patient's history were reviewed and   updated as appropriate: allergies, current medications, past family history, past medical history, past social history, past surgical history, and problem list.    Compared to one year ago, the patient feels her physical   health is worse.    Compared to one year ago, the patient feels her mental   health is the same.    Recent Hospitalizations:  She was not admitted to the hospital during the last year.       Current Medical Providers:  Patient Care Team:  Dillon Gardner MD as PCP - General (Internal Medicine)    Outpatient Medications Prior to Visit   Medication Sig Dispense Refill    Accu-Chek Softclix Lancets lancets 1 each by Other route 3 (Three) Times a Day. 300 each 3    aspirin 81 MG tablet Take 1 tablet by mouth Daily.      Blood Glucose Calibration (Accu-Chek Lyndsey) solution       DULoxetine (CYMBALTA) 60 MG capsule TAKE 1 CAPSULE EVERY NIGHT 90 capsule 3    fluorouracil (EFUDEX) 5 % cream apply to face and chest EVERY DAY for 14 days      furosemide (LASIX) 40 MG tablet TAKE 1 TABLET EVERY DAY 90 tablet 1    gabapentin (NEURONTIN) 800 MG tablet Take 1 tablet by mouth 2 (Two) Times a Day. 180 tablet 3    glucose blood (Accu-Chek Lyndsey Plus) test strip 1 each by Other route 3 (Three) Times a Day. 300 each 3    metoprolol succinate XL (TOPROL-XL) 25 MG 24 hr tablet TAKE 1 TABLET TWICE DAILY 180 tablet 3    nitroglycerin (Nitrostat) 0.4 MG SL tablet Place 1 tablet under the tongue Every 5 (Five) Minutes As Needed for Chest Pain. Take no more than 3 doses in 15 minutes. 30 tablet 12    rOPINIRole (REQUIP) 3 MG tablet TAKE 1 TABLET THREE TIMES DAILY 270 tablet 1    simvastatin (ZOCOR) 40 MG tablet TAKE 1 TABLET ONE TIME DAILY IN THE EVENING 90 tablet  3    colestipol (COLESTID) 1 g tablet Take 1 tablet by mouth Every Night. (Patient not taking: Reported on 9/29/2023)      metFORMIN (GLUCOPHAGE) 850 MG tablet TAKE 1 TABLET TWICE DAILY (Patient not taking: Reported on 9/29/2023) 180 tablet 1    potassium chloride (K-DUR,KLOR-CON) 10 MEQ CR tablet TAKE 1 TABLET EVERY DAY (Patient not taking: Reported on 9/29/2023) 90 tablet 3     No facility-administered medications prior to visit.       No opioid medication identified on active medication list. I have reviewed chart for other potential  high risk medication/s and harmful drug interactions in the elderly.        Aspirin is on active medication list. Aspirin use is indicated based on review of current medical condition/s. Pros and cons of this therapy have been discussed today. Benefits of this medication outweigh potential harm.  Patient has been encouraged to continue taking this medication.  .      Patient Active Problem List   Diagnosis    Depression    Edema    Fatigue    Mixed hyperlipidemia    Essential hypertension    Chronic bilateral low back pain without sciatica    Multiple joint pain    Obesity (BMI 30-39.9)    Obstructive apnea    Rheumatoid arthritis    Dietary counseling    Regurgitation    Sciatic leg pain    Abnormal EKG    Bladder disorder    Congestive heart failure    COPD (chronic obstructive pulmonary disease)    Degeneration of lumbar intervertebral disc    Diabetes mellitus with hemoglobin A1c goal of 7.0%-8.0%    Diverticulitis    Hip pain    Limb swelling    Lumbar adjacent segment disease with spondylolisthesis    Lumbar spondylosis    Lumbar spinal stenosis    MRSA nasal colonization    Type 2 diabetes mellitus with diabetic neuropathy, without long-term current use of insulin    Restless leg syndrome    Vitamin D deficiency    History of colon polyps    Family history of colon cancer    Diarrhea    Other forms of angina pectoris    Severe right groin pain    Iron deficiency    Iron  "deficiency anemia    Medicare annual wellness visit, subsequent    Chronic pain syndrome     Advance Care Planning   Advance Care Planning     Advance Directive is not on file.  ACP discussion was held with the patient during this visit. Patient has an advance directive (not in EMR), copy requested.     Objective    Vitals:    23 0851   BP: 160/82   Pulse: 84   Temp: 98 °F (36.7 °C)   SpO2: 96%   Weight: 70.3 kg (155 lb)   Height: 149.9 cm (59.02\")     Estimated body mass index is 31.29 kg/m² as calculated from the following:    Height as of this encounter: 149.9 cm (59.02\").    Weight as of this encounter: 70.3 kg (155 lb).    BMI is >= 30 and <35. (Class 1 Obesity). The following options were offered after discussion;: weight loss educational material (shared in after visit summary) and nutrition counseling/recommendations      Does the patient have evidence of cognitive impairment?   No    Lab Results   Component Value Date    TRIG 136 2023    HDL 36 (L) 2023     (H) 2023    VLDL 25 2023    HGBA1C 6.10 (H) 2023          HEALTH RISK ASSESSMENT    Smoking Status:  Social History     Tobacco Use   Smoking Status Former    Packs/day: 0.50    Years: 20.00    Pack years: 10.00    Types: Cigarettes    Quit date:     Years since quittin.7   Smokeless Tobacco Never   Tobacco Comments    quit 3 months ago     Alcohol Consumption:  Social History     Substance and Sexual Activity   Alcohol Use No     Fall Risk Screen:    STEADI Fall Risk Assessment was completed, and patient is at HIGH risk for falls. Assessment completed on:2023    Depression Screenin/20/2023     2:12 PM   PHQ-2/PHQ-9 Depression Screening   Little Interest or Pleasure in Doing Things 0-->not at all   Feeling Down, Depressed or Hopeless 0-->not at all   PHQ-9: Brief Depression Severity Measure Score 0       Health Habits and Functional and Cognitive Screenin/29/2023     9:00 AM "   Functional & Cognitive Status   Do you have difficulty preparing food and eating? Yes   Do you have difficulty bathing yourself, getting dressed or grooming yourself? No   Do you have difficulty using the toilet? No   Do you have difficulty moving around from place to place? Yes   Do you have trouble with steps or getting out of a bed or a chair? Yes   Do you need help using the phone?  No   Are you deaf or do you have serious difficulty hearing?  No   Do you need help to go to places out of walking distance? Yes   Do you need help shopping? Yes   Do you need help preparing meals?  Yes   Do you need help with housework?  Yes   Do you need help with laundry? Yes   Do you need help taking your medications? No   Do you need help managing money? No   Do you ever drive or ride in a car without wearing a seat belt? No   Do you have difficulty concentrating, remembering or making decisions? No       Age-appropriate Screening Schedule:  Refer to the list below for future screening recommendations based on patient's age, sex and/or medical conditions. Orders for these recommended tests are listed in the plan section. The patient has been provided with a written plan.    Health Maintenance   Topic Date Due    DXA SCAN  Never done    BMI FOLLOWUP  Never done    ZOSTER VACCINE (1 of 2) Never done    Pneumococcal Vaccine 65+ (2 - PCV) 09/23/2015    DIABETIC EYE EXAM  Never done    INFLUENZA VACCINE  08/01/2023    COVID-19 Vaccine (3 - 2023-24 season) 09/01/2023    HEMOGLOBIN A1C  03/28/2024    LIPID PANEL  09/28/2024    URINE MICROALBUMIN  09/28/2024    ANNUAL WELLNESS VISIT  09/29/2024    COLORECTAL CANCER SCREENING  03/08/2027    TDAP/TD VACCINES (3 - Tdap) 04/19/2033                  CMS Preventative Services Quick Reference  Risk Factors Identified During Encounter:    Chronic Pain: Natural history and expected course discussed. Questions answered.  OTC analgesics as needed. Proper dosing schedule discussed.    Depression/Dysphoria: Current medication is effective, no change recommended  Inactivity/Sedentary: Patient was advised to exercise at least 150 minutes a week per CDC recommendations.    The above risks/problems have been discussed with the patient.  Pertinent information has been shared with the patient in the After Visit Summary.    Diagnoses and all orders for this visit:    1. Medicare annual wellness visit, subsequent (Primary)  -     CBC & Differential; Future  -     Comprehensive Metabolic Panel; Future  -     Hemoglobin A1c; Future  -     rosuvastatin (Crestor) 20 MG tablet; Take 1 tablet by mouth Daily.  Dispense: 90 tablet; Refill: 3  -     Lipid Panel; Future  -     Hemoglobin A1c; Future  -     Vitamin D,25-Hydroxy; Future  -     Vitamin B12 anemia; Future  -     Folate anemia; Future    2. Family history of colon cancer  -     CBC & Differential; Future  -     Comprehensive Metabolic Panel; Future  -     Hemoglobin A1c; Future  -     rosuvastatin (Crestor) 20 MG tablet; Take 1 tablet by mouth Daily.  Dispense: 90 tablet; Refill: 3  -     Lipid Panel; Future  -     Hemoglobin A1c; Future  -     Vitamin D,25-Hydroxy; Future  -     Vitamin B12 anemia; Future  -     Folate anemia; Future    3. Diabetes mellitus with hemoglobin A1c goal of 7.0%-8.0%  -     CBC & Differential; Future  -     Comprehensive Metabolic Panel; Future  -     Hemoglobin A1c; Future  -     rosuvastatin (Crestor) 20 MG tablet; Take 1 tablet by mouth Daily.  Dispense: 90 tablet; Refill: 3  -     Lipid Panel; Future  -     Hemoglobin A1c; Future  -     Vitamin D,25-Hydroxy; Future  -     Vitamin B12 anemia; Future  -     Folate anemia; Future    4. Essential hypertension  -     CBC & Differential; Future  -     Comprehensive Metabolic Panel; Future  -     Hemoglobin A1c; Future  -     rosuvastatin (Crestor) 20 MG tablet; Take 1 tablet by mouth Daily.  Dispense: 90 tablet; Refill: 3  -     Lipid Panel; Future  -     Hemoglobin A1c;  Future  -     Vitamin D,25-Hydroxy; Future  -     Vitamin B12 anemia; Future  -     Folate anemia; Future    5. Spinal stenosis of lumbar region with neurogenic claudication  -     CBC & Differential; Future  -     Comprehensive Metabolic Panel; Future  -     Hemoglobin A1c; Future  -     rosuvastatin (Crestor) 20 MG tablet; Take 1 tablet by mouth Daily.  Dispense: 90 tablet; Refill: 3  -     Lipid Panel; Future  -     Hemoglobin A1c; Future  -     Vitamin D,25-Hydroxy; Future  -     Vitamin B12 anemia; Future  -     Folate anemia; Future    6. Iron deficiency anemia, unspecified iron deficiency anemia type  -     CBC & Differential; Future  -     Comprehensive Metabolic Panel; Future  -     Hemoglobin A1c; Future  -     rosuvastatin (Crestor) 20 MG tablet; Take 1 tablet by mouth Daily.  Dispense: 90 tablet; Refill: 3  -     Lipid Panel; Future  -     Hemoglobin A1c; Future  -     Vitamin D,25-Hydroxy; Future  -     Vitamin B12 anemia; Future  -     Folate anemia; Future    7. Restless leg syndrome  -     CBC & Differential; Future  -     Comprehensive Metabolic Panel; Future  -     Hemoglobin A1c; Future  -     rosuvastatin (Crestor) 20 MG tablet; Take 1 tablet by mouth Daily.  Dispense: 90 tablet; Refill: 3  -     Lipid Panel; Future  -     Hemoglobin A1c; Future  -     Vitamin D,25-Hydroxy; Future  -     Vitamin B12 anemia; Future  -     Folate anemia; Future    8. Mixed hyperlipidemia  -     CBC & Differential; Future  -     Comprehensive Metabolic Panel; Future  -     Hemoglobin A1c; Future  -     rosuvastatin (Crestor) 20 MG tablet; Take 1 tablet by mouth Daily.  Dispense: 90 tablet; Refill: 3  -     Lipid Panel; Future  -     Hemoglobin A1c; Future  -     Vitamin D,25-Hydroxy; Future  -     Vitamin B12 anemia; Future  -     Folate anemia; Future    9. Chronic diastolic congestive heart failure  -     CBC & Differential; Future  -     Comprehensive Metabolic Panel; Future  -     Hemoglobin A1c; Future  -      rosuvastatin (Crestor) 20 MG tablet; Take 1 tablet by mouth Daily.  Dispense: 90 tablet; Refill: 3  -     Lipid Panel; Future  -     Hemoglobin A1c; Future  -     Vitamin D,25-Hydroxy; Future  -     Vitamin B12 anemia; Future  -     Folate anemia; Future    10. Obesity (BMI 30-39.9)  -     CBC & Differential; Future  -     Comprehensive Metabolic Panel; Future  -     Hemoglobin A1c; Future  -     rosuvastatin (Crestor) 20 MG tablet; Take 1 tablet by mouth Daily.  Dispense: 90 tablet; Refill: 3  -     Lipid Panel; Future  -     Hemoglobin A1c; Future  -     Vitamin D,25-Hydroxy; Future  -     Vitamin B12 anemia; Future  -     Folate anemia; Future    11. Type 2 diabetes mellitus with diabetic neuropathy, without long-term current use of insulin  -     CBC & Differential; Future  -     Comprehensive Metabolic Panel; Future  -     Hemoglobin A1c; Future  -     rosuvastatin (Crestor) 20 MG tablet; Take 1 tablet by mouth Daily.  Dispense: 90 tablet; Refill: 3  -     Lipid Panel; Future  -     Hemoglobin A1c; Future  -     Vitamin D,25-Hydroxy; Future  -     Vitamin B12 anemia; Future  -     Folate anemia; Future    12. Vitamin D deficiency  -     CBC & Differential; Future  -     Comprehensive Metabolic Panel; Future  -     Hemoglobin A1c; Future  -     rosuvastatin (Crestor) 20 MG tablet; Take 1 tablet by mouth Daily.  Dispense: 90 tablet; Refill: 3  -     Lipid Panel; Future  -     Hemoglobin A1c; Future  -     Vitamin D,25-Hydroxy; Future  -     Vitamin B12 anemia; Future  -     Folate anemia; Future    13. Depression, unspecified depression type  -     CBC & Differential; Future  -     Comprehensive Metabolic Panel; Future  -     Hemoglobin A1c; Future  -     rosuvastatin (Crestor) 20 MG tablet; Take 1 tablet by mouth Daily.  Dispense: 90 tablet; Refill: 3  -     Lipid Panel; Future  -     Hemoglobin A1c; Future  -     Vitamin D,25-Hydroxy; Future  -     Vitamin B12 anemia; Future  -     Folate anemia; Future    14.  Chronic pain syndrome  -     CBC & Differential; Future  -     Comprehensive Metabolic Panel; Future  -     Hemoglobin A1c; Future  -     rosuvastatin (Crestor) 20 MG tablet; Take 1 tablet by mouth Daily.  Dispense: 90 tablet; Refill: 3  -     Lipid Panel; Future  -     Hemoglobin A1c; Future  -     Vitamin D,25-Hydroxy; Future  -     Vitamin B12 anemia; Future  -     Folate anemia; Future        Follow Up:   Next Medicare Wellness visit to be scheduled in 1 year.      An After Visit Summary and PPPS were made available to the patient.

## 2023-10-02 DIAGNOSIS — I10 ESSENTIAL HYPERTENSION: ICD-10-CM

## 2023-10-02 DIAGNOSIS — G47.33 OBSTRUCTIVE APNEA: ICD-10-CM

## 2023-10-02 DIAGNOSIS — D50.9 IRON DEFICIENCY ANEMIA, UNSPECIFIED IRON DEFICIENCY ANEMIA TYPE: ICD-10-CM

## 2023-10-02 DIAGNOSIS — F32.A DEPRESSION, UNSPECIFIED DEPRESSION TYPE: ICD-10-CM

## 2023-10-02 DIAGNOSIS — E11.40 TYPE 2 DIABETES MELLITUS WITH DIABETIC NEUROPATHY, WITHOUT LONG-TERM CURRENT USE OF INSULIN: ICD-10-CM

## 2023-10-02 DIAGNOSIS — E11.9 DIABETES MELLITUS WITH HEMOGLOBIN A1C GOAL OF 7.0%-8.0%: ICD-10-CM

## 2023-10-02 DIAGNOSIS — M54.50 CHRONIC BILATERAL LOW BACK PAIN WITHOUT SCIATICA: ICD-10-CM

## 2023-10-02 DIAGNOSIS — E66.9 OBESITY (BMI 30-39.9): ICD-10-CM

## 2023-10-02 DIAGNOSIS — M48.062 SPINAL STENOSIS OF LUMBAR REGION WITH NEUROGENIC CLAUDICATION: ICD-10-CM

## 2023-10-02 DIAGNOSIS — E78.2 MIXED HYPERLIPIDEMIA: ICD-10-CM

## 2023-10-02 DIAGNOSIS — E55.9 VITAMIN D DEFICIENCY: ICD-10-CM

## 2023-10-02 DIAGNOSIS — G25.81 RESTLESS LEG SYNDROME: ICD-10-CM

## 2023-10-02 DIAGNOSIS — M79.89 LIMB SWELLING: ICD-10-CM

## 2023-10-02 DIAGNOSIS — E61.1 IRON DEFICIENCY: ICD-10-CM

## 2023-10-02 DIAGNOSIS — M47.816 LUMBAR SPONDYLOSIS: ICD-10-CM

## 2023-10-02 DIAGNOSIS — G89.29 CHRONIC BILATERAL LOW BACK PAIN WITHOUT SCIATICA: ICD-10-CM

## 2023-10-02 RX ORDER — GABAPENTIN 800 MG/1
TABLET ORAL
Qty: 60 TABLET | Refills: 2 | Status: SHIPPED | OUTPATIENT
Start: 2023-10-02

## 2024-01-19 NOTE — EXTERNAL PATIENT INSTRUCTIONS
Patient Education   Table of Contents       Colon Polyps       Colonoscopy, Adult, Care After       Diverticulosis       High-Fiber Eating Plan     To view videos and all your education online visit,   [email or web address]   or scan this QR code with your smartphone.                  Colon Polyps        Colon polyps are tissue growths inside the colon, which is part of the large intestine. They are one of the types of polyps that can grow in the body. A polyp may be a round bump or a mushroom-shaped growth. You could have one polyp or more than one.   Most colon polyps are noncancerous (benign). However, some colon polyps can become cancerous over time. Finding and removing the polyps early can help prevent this.     What are the causes?   The exact cause of colon polyps is not known.   What increases the risk?    The following factors may make you more likely to develop this condition:       Having a family history of colorectal cancer or colon polyps.       Being older than 45 years of age.       Being younger than 45 years of age and having a significant family history of colorectal cancer or colon polyps or a genetic condition that puts you at higher risk of getting colon polyps.       Having inflammatory bowel disease, such as ulcerative colitis or Crohn's disease.      Having certain conditions passed from parent to child (hereditary conditions), such as:       Familial adenomatous polyposis (FAP).       Ding syndrome.       Turcot syndrome.       Peutz?Jeghers syndrome.       MUTYH-associated polyposis (MAP).       Being overweight.       Certain lifestyle factors. These include smoking cigarettes, drinking too much alcohol, not getting enough exercise, and eating a diet that is high in fat and red meat and low in fiber.       Having had childhood cancer that was treated with radiation of the abdomen.     What are the signs or symptoms?   Many times, there are no symptoms.    If you have symptoms,  they may include:       Blood coming from the rectum during a bowel movement.       Blood in the stool (feces). The blood may be bright red or very dark in color.       Pain in the abdomen.       A change in bowel habits, such as constipation or diarrhea.     How is this diagnosed?   This condition is diagnosed with a colonoscopy. This is a procedure in which a lighted, flexible scope is inserted into the opening between the buttocks (anus) and then passed into the colon to examine the area. Polyps are sometimes found when a colonoscopy is done as part of routine cancer screening tests.   How is this treated?   This condition is treated by removing any polyps that are found. Most polyps can be removed during a colonoscopy. Those polyps will then be tested for cancer. Additional treatment may be needed depending on the results of testing.   Follow these instructions at home:   Eating and drinking            Eat foods that are high in fiber, such as fruits, vegetables, and whole grains.       Eat foods that are high in calcium and vitamin D, such as milk, cheese, yogurt, eggs, liver, fish, and broccoli.       Limit foods that are high in fat, such as fried foods and desserts.       Limit the amount of red meat, precooked or cured meat, or other processed meat that you eat, such as hot dogs, sausages, casillas, or meat loaves.       Limit sugary drinks.     Lifestyle         Maintain a healthy weight, or lose weight if recommended by your health care provider.       Exercise every day or as told by your health care provider.      Do not  use any products that contain nicotine or tobacco, such as cigarettes, e-cigarettes, and chewing tobacco. If you need help quitting, ask your health care provider.      Do not  drink alcohol if:       Your health care provider tells you not to drink.       You are pregnant, may be pregnant, or are planning to become pregnant.      If you drink alcohol:      Limit how much you use to:        0?1 drink a day for women.       0?2 drinks a day for men.       Know how much alcohol is in your drink. In the [place] one drink equals one 12 oz bottle of beer (355 mL), one 5 oz glass of wine (148 mL), or one 1? oz glass of hard liquor (44 mL).     General instructions         Take over-the-counter and prescription medicines only as told by your health care provider.       Keep all follow-up visits. This is important. This includes having regularly scheduled colonoscopies. Talk to your health care provider about when you need a colonoscopy.       Contact a health care provider if:         You have new or worsening bleeding during a bowel movement.       You have new or increased blood in your stool.       You have a change in bowel habits.       You lose weight for no known reason.     Summary         Colon polyps are tissue growths inside the colon, which is part of the large intestine. They are one type of polyp that can grow in the body.       Most colon polyps are noncancerous (benign), but some can become cancerous over time.       This condition is diagnosed with a colonoscopy.       This condition is treated by removing any polyps that are found. Most polyps can be removed during a colonoscopy.     This information is not intended to replace advice given to you by your health care provider. Make sure you discuss any questions you have with your health care provider.     Document Released: 09/13/2005Document Revised: 04/07/2021Document Reviewed: 04/07/2021     [employer] Patient Education ? 2022 [employer]         Colonoscopy, Adult, Care After     This sheet gives you information about how to care for yourself after your procedure. Your doctor may also give you more specific instructions. If you have problems or questions, call your doctor.   What can I expect after the procedure?    After the procedure, it is common to have:       A small amount of blood in your poop (stool) for 24 hours.       Some gas.        Mild cramping or bloating in your belly (abdomen).     Follow these instructions at home:   Eating and drinking            Drink enough fluid to keep your pee (urine) pale yellow.       Follow instructions from your doctor about what you cannot eat or drink.       Return to your normal diet as told by your doctor. Avoid heavy or fried foods that are hard to digest.     Activity         Rest as told by your doctor.      Do not  sit for a long time without moving. Get up to take short walks every 1?2 hours. This is important. Ask for help if you feel weak or unsteady.       Return to your normal activities as told by your doctor. Ask your doctor what activities are safe for you.     To help cramping and bloating:            Try walking around.      Put heat on your belly as told by your doctor. Use the heat source that your doctor recommends, such as a moist heat pack or a heating pad.       Put a towel between your skin and the heat source.       Leave the heat on for 20?30 minutes.       Remove the heat if your skin turns bright red. This is very important if you are unable to feel pain, heat, or cold. You may have a greater risk of getting burned.       General instructions         If you were given a medicine to help you relax (sedative) during your procedure, it can affect you for many hours. Do not  drive or use machinery until your doctor says that it is safe.      For the first 24 hours after the procedure:      Do not  sign important documents.      Do not  drink alcohol.       Do your daily activities more slowly than normal.       Eat foods that are soft and easy to digest.       Take over-the-counter or prescription medicines only as told by your doctor.       Keep all follow-up visits as told by your doctor. This is important.       Contact a doctor if:         You have blood in your poop 2?3 days after the procedure.     Get help right away if:         You have more than a small amount of blood in  your poop.       You see large clumps of tissue (blood clots) in your poop.       Your belly is swollen.       You feel like you may vomit (nauseous).       You vomit.       You have a fever.       You have belly pain that gets worse, and medicine does not help your pain.     Summary         After the procedure, it is common to have a small amount of blood in your poop. You may also have mild cramping and bloating in your belly.       If you were given a medicine to help you relax (sedative) during your procedure, it can affect you for many hours. Do not  drive or use machinery until your doctor says that it is safe.       Get help right away if you have a lot of blood in your poop, feel like you may vomit, have a fever, or have more belly pain.     This information is not intended to replace advice given to you by your health care provider. Make sure you discuss any questions you have with your health care provider.     Document Released: 01/20/2012Document Revised: 10/23/2020Document Reviewed: 07/13/2020     [employer] Patient Education ? 2022 [employer]         Diverticulosis        Diverticulosis is a condition that develops when small pouches (diverticula) form in the wall of the large intestine (colon). The colon is where water is absorbed and stool (feces) is formed. The pouches form when the inside layer of the colon pushes through weak spots in the outer layers of the colon. You may have a few pouches or many of them.   The pouches usually do not cause problems unless they become inflamed or infected. When this happens, the condition is called diverticulitis.     What are the causes?   The cause of this condition is not known.   What increases the risk?    The following factors may make you more likely to develop this condition:       Being older than age 60. Your risk for this condition increases with age. Diverticulosis is rare among people younger than age 30. By age 80, many people have it.        Eating a low-fiber diet.       Having frequent constipation.       Being overweight.       Not getting enough exercise.       Smoking.       Taking over-the-counter pain medicines, like aspirin and ibuprofen.       Having a family history of diverticulosis.     What are the signs or symptoms?    In most people, there are no symptoms of this condition. If you do have symptoms, they may include:       Bloating.       Cramps in the abdomen.       Constipation or diarrhea.       Pain in the lower left side of the abdomen.     How is this diagnosed?    Because diverticulosis usually has no symptoms, it is most often diagnosed during an exam for other colon problems. The condition may be diagnosed by:       Using a flexible scope to examine the colon (colonoscopy).       Taking an X-ray of the colon after dye has been put into the colon (barium enema).       Having a CT scan.     How is this treated?       You may not need treatment for this condition. Your health care provider may recommend treatment to prevent problems. You may need treatment if you have symptoms or if you previously had diverticulitis. Treatment may include:       Eating a high-fiber diet.       Taking a fiber supplement.       Taking a live bacteria supplement (probiotic).       Taking medicine to relax your colon.       Follow these instructions at home:   Medicines         Take over-the-counter and prescription medicines only as told by your health care provider.       If told by your health care provider, take a fiber supplement or probiotic.     Constipation prevention       Your condition may cause constipation. To prevent or treat constipation, you may need to:       Drink enough fluid to keep your urine pale yellow.       Take over-the-counter or prescription medicines.       Eat foods that are high in fiber, such as beans, whole grains, and fresh fruits and vegetables.       Limit foods that are high in fat and processed sugars, such as fried  or sweet foods.     General instructions         Try not to strain when you have a bowel movement.       Keep all follow-up visits as told by your health care provider. This is important.       Contact a health care provider if you:         Have pain in your abdomen.       Have bloating.       Have cramps.       Have not had a bowel movement in 3 days.     Get help right away if:         Your pain gets worse.       Your bloating becomes very bad.       You have a fever or chills, and your symptoms suddenly get worse.       You vomit.       You have bowel movements that are bloody or black.       You have bleeding from your rectum.     Summary         Diverticulosis is a condition that develops when small pouches (diverticula) form in the wall of the large intestine (colon).       You may have a few pouches or many of them.       This condition is most often diagnosed during an exam for other colon problems.       Treatment may include increasing the fiber in your diet, taking supplements, or taking medicines.     This information is not intended to replace advice given to you by your health care provider. Make sure you discuss any questions you have with your health care provider.     Document Released: 09/14/2005Document Revised: 07/16/2020Document Reviewed: 07/16/2020     [employer] Patient Education ? 2022 [employer]         High-Fiber Eating Plan     Fiber, also called dietary fiber, is a type of carbohydrate. It is found foods such as fruits, vegetables, whole grains, and beans. A high-fiber diet can have many health benefits. Your health care provider may recommend a high-fiber diet to help:       Prevent constipation. Fiber can make your bowel movements more regular.       Lower your cholesterol.      Relieve the following conditions:       Inflammation of veins in the anus (hemorrhoids).       Inflammation of specific areas of the digestive tract (uncomplicated diverticulosis).       A problem of the large  intestine, also called the colon, that sometimes causes pain and diarrhea (irritable bowel syndrome, or IBS).       Prevent overeating as part of a weight-loss plan.       Prevent heart disease, type 2 diabetes, and certain cancers.     What are tips for following this plan?   Reading food labels            Check the nutrition facts label on food products for the amount of dietary fiber. Choose foods that have 5 grams of fiber or more per serving.      The goals for recommended daily fiber intake include:       Men (age 50 or younger): 34?38 g.       Men (over age 50): 28?34 g.       Women (age 50 or younger): 25?28 g.       Women (over age 50): 22?25 g.     Your daily fiber goal is _____________ g.   Shopping         Choose whole fruits and vegetables instead of processed forms, such as apple juice or applesauce.       Choose a wide variety of high-fiber foods such as avocados, lentils, oats, and kidney beans.       Read the nutrition facts label of the foods you choose. Be aware of foods with added fiber. These foods often have high sugar and sodium amounts per serving.     Cooking         Use whole-grain flour for baking and cooking.       Cook with brown rice instead of white rice.     Meal planning         Start the day with a breakfast that is high in fiber, such as a cereal that contains 5 g of fiber or more per serving.       Eat breads and cereals that are made with whole-grain flour instead of refined flour or white flour.       Eat brown rice, bulgur wheat, or millet instead of white rice.       Use beans in place of meat in soups, salads, and pasta dishes.       Be sure that half of the grains you eat each day are whole grains.     General information        You can get the recommended daily intake of dietary fiber by:       Eating a variety of fruits, vegetables, grains, nuts, and beans.       Taking a fiber supplement if you are not able to take in enough fiber in your diet. It is better to get fiber  through food than from a supplement.       Gradually increase how much fiber you consume. If you increase your intake of dietary fiber too quickly, you may have bloating, cramping, or gas.       Drink plenty of water to help you digest fiber.       Choose high-fiber snacks, such as berries, raw vegetables, nuts, and popcorn.       What foods should I eat?   Fruits   Berries. Pears. Apples. Oranges. Avocado. Prunes and raisins. Dried figs.   Vegetables   Sweet potatoes. Spinach. Kale. Artichokes. Cabbage. Broccoli. Cauliflower. Green peas. Carrots. Squash.   Grains   Whole-grain breads. Multigrain cereal. Oats and oatmeal. Brown rice. Barley. Bulgur wheat. Millet. Quinoa. Bran muffins. Popcorn. Rye wafer crackers.   Meats and other proteins   Navy beans, kidney beans, and gore beans. Soybeans. Split peas. Lentils. Nuts and seeds.   Dairy   Fiber-fortified yogurt.   Beverages   Fiber-fortified soy milk. Fiber-fortified orange juice.   Other foods   Fiber bars.   The items listed above may not be a complete list of recommended foods and beverages. Contact a dietitian for more information.   What foods should I avoid?   Fruits   Fruit juice. Cooked, strained fruit.   Vegetables   Fried potatoes. Canned vegetables. Well-cooked vegetables.   Grains   White bread. Pasta made with refined flour. White rice.   Meats and other proteins   Fatty cuts of meat. Fried chicken or fried fish.   Dairy   Milk. Yogurt. Cream cheese. Sour cream.   Fats and oils   Zena.   Beverages   Soft drinks.   Other foods   Cakes and pastries.   The items listed above may not be a complete list of foods and beverages to avoid. Talk with your dietitian about what choices are best for you.   Summary         Fiber is a type of carbohydrate. It is found in foods such as fruits, vegetables, whole grains, and beans.       A high-fiber diet has many benefits. It can help to prevent constipation, lower blood cholesterol, aid weight loss, and reduce your  Combative/assaultive * PRIOR TO ARRIVAL * risk of heart disease, diabetes, and certain cancers.       Increase your intake of fiber gradually. Increasing fiber too quickly may cause cramping, bloating, and gas. Drink plenty of water while you increase the amount of fiber you consume.       The best sources of fiber include whole fruits and vegetables, whole grains, nuts, seeds, and beans.     This information is not intended to replace advice given to you by your health care provider. Make sure you discuss any questions you have with your health care provider.     Document Released: 12/18/2006Document Revised: 04/22/2021Document Reviewed: 04/22/2021     [employer] Patient Education ? 2022 [employer]

## 2024-02-08 ENCOUNTER — OFFICE VISIT (OUTPATIENT)
Dept: INTERNAL MEDICINE | Facility: CLINIC | Age: 79
End: 2024-02-08
Payer: MEDICARE

## 2024-02-08 ENCOUNTER — TELEPHONE (OUTPATIENT)
Dept: INTERNAL MEDICINE | Facility: CLINIC | Age: 79
End: 2024-02-08
Payer: MEDICARE

## 2024-02-08 VITALS
OXYGEN SATURATION: 94 % | HEART RATE: 88 BPM | TEMPERATURE: 98.1 F | DIASTOLIC BLOOD PRESSURE: 85 MMHG | RESPIRATION RATE: 16 BRPM | BODY MASS INDEX: 32.74 KG/M2 | HEIGHT: 59 IN | WEIGHT: 162.4 LBS | SYSTOLIC BLOOD PRESSURE: 138 MMHG

## 2024-02-08 DIAGNOSIS — G89.29 CHRONIC LEFT-SIDED HEADACHE: ICD-10-CM

## 2024-02-08 DIAGNOSIS — H66.002 NON-RECURRENT ACUTE SUPPURATIVE OTITIS MEDIA OF LEFT EAR WITHOUT SPONTANEOUS RUPTURE OF TYMPANIC MEMBRANE: Primary | ICD-10-CM

## 2024-02-08 DIAGNOSIS — R51.9 CHRONIC LEFT-SIDED HEADACHE: ICD-10-CM

## 2024-02-08 PROCEDURE — 1160F RVW MEDS BY RX/DR IN RCRD: CPT

## 2024-02-08 PROCEDURE — 99214 OFFICE O/P EST MOD 30 MIN: CPT

## 2024-02-08 PROCEDURE — 1159F MED LIST DOCD IN RCRD: CPT

## 2024-02-08 PROCEDURE — 3075F SYST BP GE 130 - 139MM HG: CPT

## 2024-02-08 PROCEDURE — 3079F DIAST BP 80-89 MM HG: CPT

## 2024-02-08 RX ORDER — AMOXICILLIN 500 MG/1
1000 CAPSULE ORAL 2 TIMES DAILY
Qty: 40 CAPSULE | Refills: 0 | Status: SHIPPED | OUTPATIENT
Start: 2024-02-08 | End: 2024-02-18

## 2024-02-08 RX ORDER — PREDNISONE 20 MG/1
TABLET ORAL
Qty: 11 TABLET | Refills: 0 | Status: SHIPPED | OUTPATIENT
Start: 2024-02-08

## 2024-02-08 NOTE — TELEPHONE ENCOUNTER
Caller: Ivania Mattson    Relationship to patient: Self    Best call back number: [phone]     Chief complaint: EAR ACHE    Type of visit: SAME DAY    Requested date: ASAP

## 2024-02-08 NOTE — ASSESSMENT & PLAN NOTE
Patient complains of left ear pain that started last night.  She states it is worse with turning her head.  She does have mastoid and tragus tenderness.  She states she is has had a lot of drainage in her throat.  Left TM is dull/erythematous, fluid present.  I did recommend that she start a daily antihistamine and Flonase as well.  Treatment per orders.

## 2024-02-08 NOTE — ASSESSMENT & PLAN NOTE
Patient is complaining of a chronic left-sided headache.  Patient states that starts between the left mastoid and occipital region and radiates up her head.  She states that she does have pain with range of motion.  She states that this is been going on for several months.  She has not sought treatment for this.  She did sustain a fall and the summer of last year.  She does also have chronic degenerative disc disease and has had significant spinal surgery.    She denies any new vision changes, nausea or vomiting, weakness.  I have reviewed the CT of her head from July 2023, there were chronic changes noted.  Will get an MRI of the brain.  She will follow-up with her primary care provider in a few weeks

## 2024-02-08 NOTE — PROGRESS NOTES
"Chief Complaint  Earache (Headache x 2 months. Starts under the left ear and radiates straight up. Sometimes at night when she turns her head, it hurts \"so bad\". Hurts all the time. Never had headaches before. Ear aches on the left side. Started yesterday but woke her up this morning. )    History of Present Illness  SUBJECTIVE  Ivania Mattson presents to [facility] with complaints of left ear pain and a headache.  She states that the left ear pain started last night. She states it is worse with turning her head. She states she does have a lot of drainage in her throat.  Denies fever, chills, myalgias.    She also reports left sided headaches and they radiate to the top of her head. She states that has been going on for a few months. She states that it is fairly constant. She denies any vision changes, nausea, vomiting, diarrhea.       Past Medical History:   Diagnosis Date    Acute pancreatitis     Adenomatous polyp of stomach     Allergy     Alopecia     C. difficile colitis     Chronic lower back pain     Depression     Diabetes mellitus     Diabetes type 2, uncontrolled     Difficulty breathing     At rest    Edema     Fatigue     Heartburn     Hiatal hernia     History of colonoscopy     Polyp    Hypercholesterolemia     Insomnia     Knee joint pain     Bilateral    Loss of hair     Morbid obesity     Numbness     Polyphagia(783.6)     Rash     skin folds    Regurgitation     Sinus drainage     Skin cancer     Skin rash     Beneath both breasts    Sleep apnea     Snoring     Stress incontinence     Temporary urinary loss of control with cough and sneeze    Tingling     Wears dentures     Wears glasses       Family History   Problem Relation Age of Onset    Hypertension Mother     Heart attack Father     Obesity Brother         Morbis    Colon cancer Brother     Colon cancer Other       Past Surgical History:   Procedure Laterality Date    BLADDER SURGERY      Tack    " COLONOSCOPY N/A 3/8/2022    Procedure: COLONOSCOPY WITH POLYPECTOMY;  Surgeon: Kody Pickard MD;  Location: [facility] END[facility];  Service: Gastroenterology;  Laterality: N/A;  COLON POLYPS, DIVERTICULOSIS    HYSTERECTOMY      KNEE SURGERY Left     LAPAROSCOPIC CHOLECYSTECTOMY      LAPAROSCOPIC GASTRIC BANDING          Current Outpatient Medications:     Accu-Chek Softclix Lancets lancets, 1 each by Other route 3 (Three) Times a Day., Disp: 300 each, Rfl: 3    aspirin 81 MG tablet, Take 1 tablet by mouth Daily., Disp: , Rfl:     Blood Glucose Calibration (Accu-Chek Lyndsey) solution, , Disp: , Rfl:     DULoxetine (CYMBALTA) 60 MG capsule, TAKE 1 CAPSULE EVERY NIGHT, Disp: 90 capsule, Rfl: 3    furosemide (LASIX) 40 MG tablet, TAKE 1 TABLET EVERY DAY, Disp: 90 tablet, Rfl: 1    gabapentin (NEURONTIN) 800 MG tablet, TAKE 1 TABLET TWICE DAILY, Disp: 60 tablet, Rfl: 2    glucose blood (Accu-Chek Lyndsey Plus) test strip, 1 each by Other route 3 (Three) Times a Day., Disp: 300 each, Rfl: 3    HYDROcodone-acetaminophen (Norco) 5-325 MG per tablet, Take 1 tablet by mouth Every 6 (Six) Hours As Needed for Moderate Pain., Disp: 25 tablet, Rfl: 0    metoprolol succinate XL (TOPROL-XL) 25 MG 24 hr tablet, TAKE 1 TABLET TWICE DAILY, Disp: 180 tablet, Rfl: 3    nitroglycerin (Nitrostat) 0.4 MG SL tablet, Place 1 tablet under the tongue Every 5 (Five) Minutes As Needed for Chest Pain. Take no more than 3 doses in 15 minutes., Disp: 30 tablet, Rfl: 12    rOPINIRole (REQUIP) 3 MG tablet, TAKE 1 TABLET THREE TIMES DAILY, Disp: 270 tablet, Rfl: 1    rosuvastatin (Crestor) 20 MG tablet, Take 1 tablet by mouth Daily., Disp: 90 tablet, Rfl: 3    amoxicillin (AMOXIL) 500 MG capsule, Take 2 capsules by mouth 2 (Two) Times a Day for 10 days., Disp: 40 capsule, Rfl: 0    fluorouracil (EFUDEX) 5 % cream, apply to face and chest EVERY DAY for 14 days (Patient not taking: Reported on 2/8/2024), Disp: , Rfl:     predniSONE (DELTASONE) 20 MG  "tablet, Take 2 tablets daily for 3 days, then take 1 tablet daily for 3 days, then take 1/2 tablet daily for 4 days, Disp: 11 tablet, Rfl: 0    OBJECTIVE  Vital Signs:   /85 (BP Location: Right arm, Patient Position: Sitting, Cuff Size: Large Adult)   Pulse 88   Temp 98.1 °F (36.7 °C) (Temporal)   Resp 16   Ht 149.9 cm (59\")   Wt 73.7 kg (162 lb 6.4 oz)   SpO2 94%   BMI 32.80 kg/m²    Estimated body mass index is 32.8 kg/m² as calculated from the following:    Height as of this encounter: 149.9 cm (59\").    Weight as of this encounter: 73.7 kg (162 lb 6.4 oz).     Wt Readings from Last 3 Encounters:   02/08/24 73.7 kg (162 lb 6.4 oz)   09/29/23 70.3 kg (155 lb)   07/31/23 72.7 kg (160 lb 4.4 oz)     BP Readings from Last 3 Encounters:   02/08/24 138/85   09/29/23 160/82   07/31/23 142/77       Physical Exam  Vitals and nursing note reviewed.   Constitutional:       Appearance: Normal appearance.   HENT:      Head: Normocephalic.      Right Ear: A middle ear effusion is present.      Left Ear: A middle ear effusion is present. Tympanic membrane is erythematous.      Nose: Congestion present.   Eyes:      Extraocular Movements: Extraocular movements intact.      Conjunctiva/sclera: Conjunctivae normal.   Cardiovascular:      Rate and Rhythm: Normal rate and regular rhythm.      Heart sounds: Normal heart sounds. No murmur heard.  Pulmonary:      Effort: Pulmonary effort is normal.      Breath sounds: Normal breath sounds. No wheezing or rales.   Abdominal:      General: Bowel sounds are normal.      Palpations: Abdomen is soft.      Tenderness: There is no abdominal tenderness. There is no guarding.   Musculoskeletal:         General: No swelling. Normal range of motion.   Skin:     General: Skin is warm and dry.   Neurological:      Mental Status: She is alert. Mental status is at baseline.      Motor: Weakness present.   Psychiatric:         Mood and Affect: Mood normal.         Behavior: Behavior " normal.         Thought Content: Thought content normal.         Judgment: Judgment normal.          Result Review        No Images in the past 120 days found..     The above data has been reviewed by JIN Hernandez 02/08/2024 15:36 EST.          Patient Care Team:  Dillon Gardner MD as PCP - General (Internal Medicine)            ASSESSMENT & PLAN    Diagnoses and all orders for this visit:    1. Non-recurrent acute suppurative otitis media of left ear without spontaneous rupture of tympanic membrane (Primary)  Assessment & Plan:  Patient complains of left ear pain that started last night.  She states it is worse with turning her head.  She does have mastoid and tragus tenderness.  She states she is has had a lot of drainage in her throat.  Left TM is dull/erythematous, fluid present.  I did recommend that she start a daily antihistamine and Flonase as well.  Treatment per orders.    Orders:  -     predniSONE (DELTASONE) 20 MG tablet; Take 2 tablets daily for 3 days, then take 1 tablet daily for 3 days, then take 1/2 tablet daily for 4 days  Dispense: 11 tablet; Refill: 0  -     amoxicillin (AMOXIL) 500 MG capsule; Take 2 capsules by mouth 2 (Two) Times a Day for 10 days.  Dispense: 40 capsule; Refill: 0    2. Chronic left-sided headache  Assessment & Plan:  Patient is complaining of a chronic left-sided headache.  Patient states that starts between the left mastoid and occipital region and radiates up her head.  She states that she does have pain with range of motion.  She states that this is been going on for several months.  She has not sought treatment for this.  She did sustain a fall and the summer of last year.  She does also have chronic degenerative disc disease and has had significant spinal surgery.    She denies any new vision changes, nausea or vomiting, weakness.  I have reviewed the CT of her head from July 2023, there were chronic changes noted.  Will get an MRI of the brain.  She will  follow-up with her primary care provider in a few weeks    Orders:  -     MRI Brain Without Contrast; Future         Tobacco Use: Medium Risk (2/8/2024)    Patient History     Smoking Tobacco Use: Former     Smokeless Tobacco Use: Never     Passive Exposure: Not on file       Follow Up     Return in about 3 weeks (around 2/29/2024) for Recheck.    Please note that portions of this note were completed with a voice recognition program.    Patient was given instructions and counseling regarding her condition or for health maintenance advice. Please see specific information pulled into the AVS if appropriate.   I have reviewed information obtained and documented by others and I have confirmed the accuracy of this documented note.    JIN Hernandez

## 2024-03-18 DIAGNOSIS — R07.89 CHEST PAIN, ATYPICAL: ICD-10-CM

## 2024-03-19 ENCOUNTER — HOSPITAL ENCOUNTER (OUTPATIENT)
Dept: MRI IMAGING | Facility: HOSPITAL | Age: 79
Discharge: HOME | End: 2024-03-19
Payer: MEDICARE

## 2024-03-19 DIAGNOSIS — R51.9 CHRONIC LEFT-SIDED HEADACHE: ICD-10-CM

## 2024-03-19 DIAGNOSIS — G89.29 CHRONIC LEFT-SIDED HEADACHE: ICD-10-CM

## 2024-03-19 PROCEDURE — 70551 MRI BRAIN STEM W/O DYE: CPT

## 2024-03-19 RX ORDER — ISOSORBIDE MONONITRATE 30 MG/1
30 TABLET, EXTENDED RELEASE ORAL DAILY
Qty: 90 TABLET | Refills: 3 | OUTPATIENT
Start: 2024-03-19

## 2024-03-20 ENCOUNTER — TELEPHONE (OUTPATIENT)
Dept: INTERNAL MEDICINE | Age: 79
End: 2024-03-20

## 2024-03-20 NOTE — TELEPHONE ENCOUNTER
HUB may RELAY message to patient.     ----- Message from JIN Hernandez sent at 3/19/2024  2:56 PM EDT -----  Please call patient and let her know that her mri of the brain did not show any acute findings. We did see some chronic changes which is to be expected for her age.  F.u with pcp as scheduled-thanks

## 2024-03-26 ENCOUNTER — OFFICE VISIT (OUTPATIENT)
Dept: INTERNAL MEDICINE | Age: 79
End: 2024-03-26
Payer: MEDICARE

## 2024-03-26 VITALS
HEIGHT: 59 IN | OXYGEN SATURATION: 92 % | DIASTOLIC BLOOD PRESSURE: 75 MMHG | SYSTOLIC BLOOD PRESSURE: 129 MMHG | HEART RATE: 78 BPM | WEIGHT: 159 LBS | TEMPERATURE: 98.2 F | BODY MASS INDEX: 32.05 KG/M2

## 2024-03-26 DIAGNOSIS — G89.4 CHRONIC PAIN SYNDROME: ICD-10-CM

## 2024-03-26 DIAGNOSIS — M47.816 LUMBAR SPONDYLOSIS: ICD-10-CM

## 2024-03-26 DIAGNOSIS — E66.9 OBESITY (BMI 30-39.9): ICD-10-CM

## 2024-03-26 DIAGNOSIS — I10 ESSENTIAL HYPERTENSION: ICD-10-CM

## 2024-03-26 DIAGNOSIS — H53.2 DOUBLE VISION WITH BOTH EYES OPEN: ICD-10-CM

## 2024-03-26 DIAGNOSIS — M51.36 DEGENERATION OF LUMBAR INTERVERTEBRAL DISC: Primary | ICD-10-CM

## 2024-03-26 DIAGNOSIS — M48.062 SPINAL STENOSIS OF LUMBAR REGION WITH NEUROGENIC CLAUDICATION: ICD-10-CM

## 2024-03-26 DIAGNOSIS — E55.9 VITAMIN D DEFICIENCY: ICD-10-CM

## 2024-03-26 DIAGNOSIS — R53.83 OTHER FATIGUE: ICD-10-CM

## 2024-03-26 DIAGNOSIS — E78.2 MIXED HYPERLIPIDEMIA: ICD-10-CM

## 2024-03-26 DIAGNOSIS — E11.40 TYPE 2 DIABETES MELLITUS WITH DIABETIC NEUROPATHY, WITHOUT LONG-TERM CURRENT USE OF INSULIN: ICD-10-CM

## 2024-03-26 DIAGNOSIS — G25.81 RESTLESS LEG SYNDROME: ICD-10-CM

## 2024-03-26 DIAGNOSIS — G47.33 OBSTRUCTIVE APNEA: ICD-10-CM

## 2024-03-26 DIAGNOSIS — F32.A DEPRESSION, UNSPECIFIED DEPRESSION TYPE: ICD-10-CM

## 2024-03-26 NOTE — PROGRESS NOTES
"CHIEF COMPLAINT/ HPI:  Hypertension (Routine follow up. Lab follow up. Pt states double vision , MRI of brain wanting to know why she has headaches and vision disturbance. )      She came to our office and had an MRI of the brain done by one of the nurse practitioners,    Not driving anymore,    Objective   Vital Signs  Vitals:    03/26/24 1005   BP: 129/75   Pulse: 78   Temp: 98.2 °F (36.8 °C)   SpO2: 92%   Weight: 72.1 kg (159 lb)   Height: 149.9 cm (59.02\")      Body mass index is 32.1 kg/m².  Review of Systems   HENT: Negative.     Eyes:  Positive for double vision.   Respiratory: Negative.     Cardiovascular: Negative.    Gastrointestinal: Negative.    Endocrine: Negative.    Genitourinary: Negative.    Musculoskeletal: Negative.    Allergic/Immunologic: Negative.    Neurological:  Positive for weakness.   Hematological: Negative.    Psychiatric/Behavioral: Negative.        Physical Exam  Constitutional:       General: She is not in acute distress.     Appearance: Normal appearance. She is obese.   HENT:      Head: Normocephalic.      Mouth/Throat:      Mouth: Mucous membranes are moist.   Eyes:      Conjunctiva/sclera: Conjunctivae normal.      Pupils: Pupils are equal, round, and reactive to light.   Cardiovascular:      Rate and Rhythm: Normal rate and regular rhythm.      Pulses: Normal pulses.      Heart sounds: Normal heart sounds.   Pulmonary:      Effort: Pulmonary effort is normal.      Breath sounds: Normal breath sounds.   Abdominal:      General: Bowel sounds are normal.      Palpations: Abdomen is soft.   Musculoskeletal:         General: No swelling. Normal range of motion.      Cervical back: Neck supple.   Skin:     General: Skin is warm and dry.      Coloration: Skin is not jaundiced.   Neurological:      Mental Status: She is alert and oriented to person, place, and time.   Psychiatric:         Mood and Affect: Mood normal.         Behavior: Behavior normal.         Thought Content: Thought " content normal.         Judgment: Judgment normal.        Result Review :   Lab Results   Component Value Date    PROBNP 89.7 03/21/2023    PROBNP 90.1 05/25/2022    PROBNP 44.6 10/11/2021    BNP 80 05/20/2019     05/16/2019    BNP 4275 (H) 05/12/2019     CMP          5/16/2023    09:41 7/31/2023    18:02 9/28/2023    10:55   CMP   Glucose 89  105  79    BUN 11  12  7    Creatinine 0.98  0.76  0.75    EGFR 58.8  79.8  80.6    Sodium 140  138  139    Potassium 3.8  4.1  4.5    Chloride 103  101  101    Calcium 8.3  9.0  9.4    Total Protein 6.8  7.2  6.7    Albumin 4.1  3.7  4.2    Globulin 2.7  3.5  2.5    Total Bilirubin 0.2  0.4  0.4    Alkaline Phosphatase 59  61  55    AST (SGOT) 34  29  26    ALT (SGPT) 12  14  13    Albumin/Globulin Ratio 1.5  1.1  1.7    BUN/Creatinine Ratio 11.2  15.8  9.3    Anion Gap 9.0  10.7  12.1      CBC w/diff          5/16/2023    09:41 7/31/2023    18:02 9/28/2023    10:55   CBC w/Diff   WBC 4.46  8.81  5.17    RBC 3.84  4.57  5.04    Hemoglobin 9.9  12.8  14.7    Hematocrit 30.4  39.5  43.8    MCV 79.2  86.4  86.9    MCH 25.8  28.0  29.2    MCHC 32.6  32.4  33.6    RDW 14.1  20.2  16.9    Platelets 246  183  195    Neutrophil Rel % 52.5  71.4  48.3    Immature Granulocyte Rel % 0.4  0.3  0.4    Lymphocyte Rel % 34.3  18.5  41.6    Monocyte Rel % 11.0  9.0  8.1    Eosinophil Rel % 1.8  0.6  1.4    Basophil Rel % 0.0  0.2  0.2       Lipid Panel          9/28/2023    10:55   Lipid Panel   Total Cholesterol 178    Triglycerides 136    HDL Cholesterol 36    VLDL Cholesterol 25    LDL Cholesterol  117    LDL/HDL Ratio 3.19       Lab Results   Component Value Date    TSH 1.860 04/14/2023    TSH 2.030 05/25/2022    TSH 1.650 10/11/2021      Lab Results   Component Value Date    FREET4 1.08 05/25/2022    FREET4 1.4 05/10/2019      A1C Last 3 Results          9/28/2023    10:55   HGBA1C Last 3 Results   Hemoglobin A1C 6.10                        Visit Diagnoses:    ICD-10-CM ICD-9-CM    1. Degeneration of lumbar intervertebral disc  M51.36 722.52   2. Other fatigue  R53.83 780.79   3. Restless leg syndrome  G25.81 333.94   4. Spinal stenosis of lumbar region with neurogenic claudication  M48.062 724.03   5. Chronic pain syndrome  G89.4 338.4   6. Obstructive apnea  G47.33 327.23   7. Depression, unspecified depression type  F32.A 311   8. Mixed hyperlipidemia  E78.2 272.2   9. Obesity (BMI 30-39.9)  E66.9 278.00   10. Vitamin D deficiency  E55.9 268.9   11. Essential hypertension  I10 401.9   12. Lumbar spondylosis  M47.816 721.3   13. Type 2 diabetes mellitus with diabetic neuropathy, without long-term current use of insulin  E11.40 250.60     357.2   14. Double vision with both eyes open  H53.2 368.2       Assessment and Plan   Diagnoses and all orders for this visit:    1. Degeneration of lumbar intervertebral disc (Primary)  -     MicroAlbumin, Urine, Random - Urine, Clean Catch; Future  -     TSH+Free T4; Future  -     Hemoglobin A1c; Future  -     Comprehensive Metabolic Panel; Future  -     CBC & Differential; Future  -     Lipid Panel; Future    2. Other fatigue  -     MicroAlbumin, Urine, Random - Urine, Clean Catch; Future  -     TSH+Free T4; Future  -     Hemoglobin A1c; Future  -     Comprehensive Metabolic Panel; Future  -     CBC & Differential; Future  -     Lipid Panel; Future    3. Restless leg syndrome  -     MicroAlbumin, Urine, Random - Urine, Clean Catch; Future  -     TSH+Free T4; Future  -     Hemoglobin A1c; Future  -     Comprehensive Metabolic Panel; Future  -     CBC & Differential; Future  -     Lipid Panel; Future    4. Spinal stenosis of lumbar region with neurogenic claudication  -     MicroAlbumin, Urine, Random - Urine, Clean Catch; Future  -     TSH+Free T4; Future  -     Hemoglobin A1c; Future  -     Comprehensive Metabolic Panel; Future  -     CBC & Differential; Future  -     Lipid Panel; Future    5. Chronic pain syndrome  -     MicroAlbumin, Urine, Random -  Urine, Clean Catch; Future  -     TSH+Free T4; Future  -     Hemoglobin A1c; Future  -     Comprehensive Metabolic Panel; Future  -     CBC & Differential; Future  -     Lipid Panel; Future    6. Obstructive apnea  -     MicroAlbumin, Urine, Random - Urine, Clean Catch; Future  -     TSH+Free T4; Future  -     Hemoglobin A1c; Future  -     Comprehensive Metabolic Panel; Future  -     CBC & Differential; Future  -     Lipid Panel; Future    7. Depression, unspecified depression type  -     MicroAlbumin, Urine, Random - Urine, Clean Catch; Future  -     TSH+Free T4; Future  -     Hemoglobin A1c; Future  -     Comprehensive Metabolic Panel; Future  -     CBC & Differential; Future  -     Lipid Panel; Future    8. Mixed hyperlipidemia  -     MicroAlbumin, Urine, Random - Urine, Clean Catch; Future  -     TSH+Free T4; Future  -     Hemoglobin A1c; Future  -     Comprehensive Metabolic Panel; Future  -     CBC & Differential; Future  -     Lipid Panel; Future    9. Obesity (BMI 30-39.9)  -     MicroAlbumin, Urine, Random - Urine, Clean Catch; Future  -     TSH+Free T4; Future  -     Hemoglobin A1c; Future  -     Comprehensive Metabolic Panel; Future  -     CBC & Differential; Future  -     Lipid Panel; Future    10. Vitamin D deficiency  -     MicroAlbumin, Urine, Random - Urine, Clean Catch; Future  -     TSH+Free T4; Future  -     Hemoglobin A1c; Future  -     Comprehensive Metabolic Panel; Future  -     CBC & Differential; Future  -     Lipid Panel; Future    11. Essential hypertension  -     MicroAlbumin, Urine, Random - Urine, Clean Catch; Future  -     TSH+Free T4; Future  -     Hemoglobin A1c; Future  -     Comprehensive Metabolic Panel; Future  -     CBC & Differential; Future  -     Lipid Panel; Future    12. Lumbar spondylosis  -     MicroAlbumin, Urine, Random - Urine, Clean Catch; Future  -     TSH+Free T4; Future  -     Hemoglobin A1c; Future  -     Comprehensive Metabolic Panel; Future  -     CBC &  Differential; Future  -     Lipid Panel; Future    13. Type 2 diabetes mellitus with diabetic neuropathy, without long-term current use of insulin  -     MicroAlbumin, Urine, Random - Urine, Clean Catch; Future  -     TSH+Free T4; Future  -     Hemoglobin A1c; Future  -     Comprehensive Metabolic Panel; Future  -     CBC & Differential; Future  -     Lipid Panel; Future    14. Double vision with both eyes open  -     Ambulatory Referral to Neurology    Vision changes double vision, will refer to dr Andrade neurology for second opinion, MRI of the brain March 19, 2024 shows no acute intracranial process small vessel chronic ischemic changes otherwise    Restless legs,--continues  requip 3 mg tid and 10 mg qhs and prn hydocodone , gabapentin 800 mg twice daily ---Status post IV iron June 6 through June 13, 2023 with Venofer      Anemia iron deficiency----- finished IV iron Venofer June 2023, current iron level normal, hemoglobin much improved at 14 September 28, 2023     Peripheral neuropathy--- continues gabapentin at night along with the change in the Requip and iron infusions as of June 29, 2023     Chronic back pain severe at times, makes her nauseated no appetite, has tried different medications pain medications injections without relief, has been to pain management in the past discussed treatment options going forward September 29, 2023 patient's been to [facility] spine [facility] had injections done also, 2021 December we will get a send in [place] [employer] for her due to chronic pain and worsening pain and upcoming trip September 29, 2023 I sent in Norco 5/3/2025 every 4 hours as needed #25 no refills     Chest pains,  cardiac stress test August 23, 2022, ---showed normal left ventricle size ejection fraction 70% no wall motion abnormalities low risk myocardial perfusion study, with Lexiscan, echocardiogram September 9, 2022 showed normal ejection fraction 68% posterior mitral valve leaflet thickening chordal Gavino was  noted, diastolic dysfunction with mild septal asymmetric hypertrophy, chest x-ray no active disease August 27, 2022     Type 2 diabetes hemoglobin A1c -, patient stopped metformin, diarrhea improved, urine for microalbumin was normal September 28, 2023--- no treatment currently, will check labs soon March 2024     left foot pain arterial evaluation March 15, 2022 shows EDEL left and right to be normal,---     Previous left total knee arthroplasty, laparoscopic cholecystectomy March 2016, previous colonoscopies polypectomies, right breast biopsy, right hip surgery, lap band surgery, ADRIANO, recent back surgery July 2018,     Mixed hyperlipidemia discussed improvement in LDL down to 70 if possible September 2023,--- continue Crestor 20 milligrams daily     Chronic pain, continue Cymbalta, 60 mg daily     Depression anxiety, continues Cymbalta 60 mg daily     Peripheral neuropathy lower legs,---CONT GABAPENTIN 800 Mg  nightly     Previous pelvic fractures     Diverticulosis and diverticulitis in September 2015     small bowel enteritis small bowel perforation--- resolved 2016     Kidney stones     Alcohol use in the past,--has not drank in greater than 4 month as of March 2023     ERCP Common bile duct stone cholelithiasis choledocholithiasis with stent placement and sphincterotomy February 29, 2016, status post laparoscopic cholecystectomy Dr. Huizar     Paroxysmal atrial fibrillation May 2019 with RVR, clinically in sinus rhythm     Hypertension, continues metoprolol XL 25 mg daily,     Obstructive sleep apnea, continue CPAP     Chronic lower extremity edema, patient has stopped taking Lasix 40 mg daily as of September 2023 ---- edema has improved in her legs,  BR NP levels are extremely low at 89, March 2023 ------ recommend compression devices rather than more diuretics given the kidney numbers and heart numbers being good, November 2022               Follow Up   Return in about 6 months (around 9/26/2024).  Patient  was given instructions and counseling regarding her condition or for health maintenance advice. Please see specific information pulled into the AVS if appropriate.

## 2024-03-29 ENCOUNTER — TELEPHONE (OUTPATIENT)
Dept: INTERNAL MEDICINE | Age: 79
End: 2024-03-29

## 2024-03-29 NOTE — TELEPHONE ENCOUNTER
Patient called stating that we denied her refill on her Isosorbide. She would like to know why as she has been on this medication for years. Please advise.

## 2024-03-29 NOTE — TELEPHONE ENCOUNTER
Tried to call patient. Picked up the phone and said hello and nothing else. Will try again on Monday.

## 2024-03-29 NOTE — TELEPHONE ENCOUNTER
Caller: Ivania Mattson    Relationship: Self    Best call back number: [phone]    What is the best time to reach you: ANY    Who are you requesting to speak with (clinical staff, provider,  specific staff member): CLINICAL    What was the call regarding: PATIENT STATED SHE RECEIVED A LETTER THAT STATED A MEDICATION HAD BEEN DENIED FROM [facility]. SHE WOULD LIKE TO SPEAK TO CLINICAL STAFF AND FIND OUT WHY IT WAS DENIED.

## 2024-04-01 ENCOUNTER — TELEPHONE (OUTPATIENT)
Dept: INTERNAL MEDICINE | Age: 79
End: 2024-04-01
Payer: MEDICARE

## 2024-04-01 RX ORDER — ISOSORBIDE MONONITRATE 30 MG/1
30 TABLET, EXTENDED RELEASE ORAL DAILY
COMMUNITY
End: 2024-04-01 | Stop reason: SDUPTHER

## 2024-04-01 RX ORDER — ISOSORBIDE MONONITRATE 30 MG/1
30 TABLET, EXTENDED RELEASE ORAL DAILY
Qty: 90 TABLET | Refills: 1 | Status: SHIPPED | OUTPATIENT
Start: 2024-04-01

## 2024-04-01 NOTE — TELEPHONE ENCOUNTER
Spoke with patient. She states she is on Isosorbide 60 mgs qd. Wants it sent to [facility] in [place], [place]

## 2024-04-30 ENCOUNTER — TELEPHONE (OUTPATIENT)
Dept: INTERNAL MEDICINE | Age: 79
End: 2024-04-30
Payer: MEDICARE

## 2024-04-30 DIAGNOSIS — G89.29 CHRONIC BILATERAL LOW BACK PAIN WITHOUT SCIATICA: ICD-10-CM

## 2024-04-30 DIAGNOSIS — E78.2 MIXED HYPERLIPIDEMIA: ICD-10-CM

## 2024-04-30 DIAGNOSIS — E11.40 TYPE 2 DIABETES MELLITUS WITH DIABETIC NEUROPATHY, WITHOUT LONG-TERM CURRENT USE OF INSULIN: ICD-10-CM

## 2024-04-30 DIAGNOSIS — G89.29 CHRONIC BILATERAL LOW BACK PAIN WITHOUT SCIATICA: Primary | ICD-10-CM

## 2024-04-30 DIAGNOSIS — M47.816 LUMBAR SPONDYLOSIS: ICD-10-CM

## 2024-04-30 DIAGNOSIS — E66.9 OBESITY (BMI 30-39.9): ICD-10-CM

## 2024-04-30 DIAGNOSIS — I10 ESSENTIAL HYPERTENSION: ICD-10-CM

## 2024-04-30 DIAGNOSIS — M79.89 LIMB SWELLING: ICD-10-CM

## 2024-04-30 DIAGNOSIS — E61.1 IRON DEFICIENCY: ICD-10-CM

## 2024-04-30 DIAGNOSIS — G25.81 RESTLESS LEG SYNDROME: ICD-10-CM

## 2024-04-30 DIAGNOSIS — E55.9 VITAMIN D DEFICIENCY: ICD-10-CM

## 2024-04-30 DIAGNOSIS — G47.33 OBSTRUCTIVE APNEA: ICD-10-CM

## 2024-04-30 DIAGNOSIS — F32.A DEPRESSION, UNSPECIFIED DEPRESSION TYPE: ICD-10-CM

## 2024-04-30 DIAGNOSIS — E11.9 DIABETES MELLITUS WITH HEMOGLOBIN A1C GOAL OF 7.0%-8.0%: ICD-10-CM

## 2024-04-30 DIAGNOSIS — M48.062 SPINAL STENOSIS OF LUMBAR REGION WITH NEUROGENIC CLAUDICATION: ICD-10-CM

## 2024-04-30 DIAGNOSIS — M54.50 CHRONIC BILATERAL LOW BACK PAIN WITHOUT SCIATICA: ICD-10-CM

## 2024-04-30 DIAGNOSIS — D50.9 IRON DEFICIENCY ANEMIA, UNSPECIFIED IRON DEFICIENCY ANEMIA TYPE: ICD-10-CM

## 2024-04-30 DIAGNOSIS — M54.50 CHRONIC BILATERAL LOW BACK PAIN WITHOUT SCIATICA: Primary | ICD-10-CM

## 2024-04-30 NOTE — TELEPHONE ENCOUNTER
Patient called asking if we could place a referral for her to go to PT for her back. She said since her back surgery 6 years ago it's been bothering her and now she has trouble walking due to the pain. Pended the referral for signature.

## 2024-05-01 RX ORDER — ROPINIROLE 3 MG/1
TABLET, FILM COATED ORAL
Qty: 270 TABLET | Refills: 3 | Status: SHIPPED | OUTPATIENT
Start: 2024-05-01

## 2024-05-01 RX ORDER — POTASSIUM CHLORIDE 750 MG/1
TABLET, EXTENDED RELEASE ORAL
Qty: 90 TABLET | Refills: 3 | OUTPATIENT
Start: 2024-05-01

## 2024-05-01 RX ORDER — GABAPENTIN 800 MG/1
TABLET ORAL
Qty: 60 TABLET | Refills: 2 | Status: SHIPPED | OUTPATIENT
Start: 2024-05-01

## 2024-05-07 ENCOUNTER — TELEPHONE (OUTPATIENT)
Dept: INTERNAL MEDICINE | Age: 79
End: 2024-05-07

## 2024-05-07 DIAGNOSIS — Z78.0 ENCOUNTER FOR OSTEOPOROSIS SCREENING IN ASYMPTOMATIC POSTMENOPAUSAL PATIENT: ICD-10-CM

## 2024-05-07 DIAGNOSIS — Z13.820 ENCOUNTER FOR OSTEOPOROSIS SCREENING IN ASYMPTOMATIC POSTMENOPAUSAL PATIENT: ICD-10-CM

## 2024-05-07 DIAGNOSIS — Z13.820 SCREENING FOR OSTEOPOROSIS: Primary | ICD-10-CM

## 2024-05-07 NOTE — TELEPHONE ENCOUNTER
Caller: Ivania Mattson    Relationship: Self    Best call back number: [phone]    What orders are you requesting (i.e. lab or imaging): ORDER FOR BONE DENSITY TEST     Where will you receive your lab/imaging services: [facility]     Additional notes: PATIENT WOULD LIKE CALL WHEN SCHEDULED.

## 2024-05-20 ENCOUNTER — HOSPITAL ENCOUNTER (OUTPATIENT)
Dept: BONE DENSITY | Facility: HOSPITAL | Age: 79
Discharge: HOME | End: 2024-05-20
Admitting: INTERNAL MEDICINE
Payer: MEDICARE

## 2024-05-20 PROCEDURE — 77080 DXA BONE DENSITY AXIAL: CPT

## 2024-05-29 ENCOUNTER — TREATMENT (OUTPATIENT)
Dept: PHYSICAL THERAPY | Facility: CLINIC | Age: 79
End: 2024-05-29
Payer: MEDICARE

## 2024-05-29 DIAGNOSIS — M54.50 CHRONIC BILATERAL LOW BACK PAIN WITHOUT SCIATICA: Primary | ICD-10-CM

## 2024-05-29 DIAGNOSIS — G89.29 CHRONIC BILATERAL LOW BACK PAIN WITHOUT SCIATICA: Primary | ICD-10-CM

## 2024-05-29 NOTE — PROGRESS NOTES
"  Physical Therapy Initial Evaluation and Plan of Care                       Patient: Ivania Mattson   : 1945  Diagnosis/ICD-10 Code:  Chronic bilateral low back pain without sciatica [M54.50, G89.29]  Referring practitioner: Dillon Gardner, *  Date of Initial Visit: 2024  Today's Date: 2024  Patient seen for 1 sessions           Subjective Questionnaire: Oswestry: 27      Subjective Evaluation    History of Present Illness  Mechanism of injury: Patient reports 6 years of low back pain following surgery and about a year of back pain prior to her multilevel fusion. She reports a current standing tolerance of 5 minutes before pain is severe enough to where she must sit. She reports being 2x a worse now than it was before the surgery. She reports multiple previous bouts of PT, she enjoys working her back specifically and garrett not believe only leg work will help. She denies any symptoms into the legs or n/t. She denies falls in the past year but does endorse double vision making balance difficult and that \"it does not take much to knock me over\".              Objective          Active Range of Motion     Lumbar   Flexion: 55 (%) degrees   Extension: 25 (%) degrees   Left lateral flexion: 30 (%) degrees   Right lateral flexion: 30 (%) degrees     Strength/Myotome Testing     Left Hip   Planes of Motion   Flexion: 4+  Extension: 4+  Abduction: 4-  Adduction: 4+    Right Hip   Planes of Motion   Flexion: 4+  Extension: 4  Abduction: 4  Adduction: 5    Left Knee   Flexion: 5  Extension: 4+    Right Knee   Flexion: 5  Extension: 4+    Left Ankle/Foot   Dorsiflexion: 5    Right Ankle/Foot   Dorsiflexion: 5    Functional Assessment     Single Leg Stance   Left: 0 seconds  Right: 0 seconds    Comments  5x STS: 20.19s      See Exercise, Manual, and Modality Logs for complete treatment.     Assessment & Plan       Assessment  Impairments: abnormal gait, abnormal muscle firing, abnormal muscle tone, " abnormal or restricted ROM, activity intolerance, impaired balance, impaired physical strength, lacks appropriate home exercise program, pain with function and safety issue   Functional limitations: carrying objects, lifting, sleeping, walking, pulling, pushing, uncomfortable because of pain, moving in bed, sitting, standing, stooping and unable to perform repetitive tasks   Assessment details: The patient presents to physical therapy with complaints of low back pain. Signs and symptoms are consistent with low back pain secondary to numerous abdominal surgeries, presence of lumbar fusion from T12-L5, and degenerative changes to the spine. She would benefit from skilled physical therapy as described below to address these limitations and allow the patient to return to her prior level of function.   Prognosis: good    Goals  Plan Goals: LOW BACK PROBLEMS:    1. The patient complains of low back pain.  LTG 1: 12 weeks:  The patient will report a pain rating of 2 or better in order to improve  tolerance to activities of daily living and improve sleep quality.  STATUS:  New  STG 1a: 6 weeks:  The patient will report a pain rating of 4 or better.  STATUS:  New    2. The patient demonstrates weakness of the hip.  LTG 2: 12 weeks:  The patient will demonstrate 4+ /5 strength for hip flexion, abduction,  and extension in order to improve hip stability.  STATUS:  New  STG 2a: 6 weeks:  The patient will demonstrate 4 /5 strength for hip flexion, abduction,  and extension.  STATUS:  New    3. The patient has limited lumbar AROM  LTG 4: 12 weeks:  The patient will demonstrate lumbar AROM as follows: 75% of flexion and 75% of extension.  STATUS:  New  STG 4a: 6 weeks: The patient will demonstrate lumbar AROM as follows: 65% of flexion and 65% of extension.  STATUS:  New    4. Mobility: Walking/Moving Around Functional Limitation    LTG 4: 12 weeks:  The patient will demonstrate improved function by achieving a score of 5 on the  NIYA.  STATUS:  New  STG 4 a: 6 weeks:  The patient will demonstrate improved function by achieving a score of 10 on the NIYA.    STATUS:  New     Plan  Therapy options: will be seen for skilled therapy services  Planned modality interventions: cryotherapy, electrical stimulation/Russian stimulation, TENS, dry needling and traction  Planned therapy interventions: balance/weight-bearing training, ADL retraining, soft tissue mobilization, strengthening, stretching, therapeutic activities, joint mobilization, home exercise program, functional ROM exercises, flexibility, body mechanics training, postural training, neuromuscular re-education, manual therapy, abdominal trunk stabilization, IADL retraining and spinal/joint mobilization  Frequency: 2x week  Duration in weeks: 12  Treatment plan discussed with: patient        Visit Diagnoses:    ICD-10-CM ICD-9-CM   1. Chronic bilateral low back pain without sciatica  M54.50 724.2    G89.29 338.29       History # of Personal Factors and/or Comorbidities: MODERATE (1-2)  Examination of Body System(s): # of elements: MODERATE (3)  Clinical Presentation: EVOLVING  Clinical Decision Making: MODERATE      Timed:         Manual Therapy:    0     mins  44076;     Therapeutic Exercise:    25     mins  03542;     Neuromuscular Sven:    0    mins  31689;    Therapeutic Activity:     0     mins  93427;     Gait Trainin     mins  25106;     Ultrasound:     0     mins  99765;    Ionto                               0    mins   65424  Self Care                       0     mins   95352  Canalith Repos    0     mins 77156      Un-Timed:  Electrical Stimulation:    0     mins  55578 ( );  Dry Needling     0     mins self-pay  Traction     0     mins 32048  Low Eval     0     Mins  00994  Mod Eval     33     Mins  97081  High Eval                       0     Mins  15282  Re-Eval                           0    mins  61709    Timed Treatment:   25   mins   Total Treatment:     58    mins    PT SIGNATURE: Daryl Cruz, PT    Electronically signed 5/29/2024    [place] License: PT - [id number]      Initial Certification  Certification Period: 5/29/2024 thru 8/26/2024  I certify that the therapy services are furnished while this patient is under my care.  The services outlined above are required by this patient, and will be reviewed every 90 days.     PHYSICIAN: Dillon Gardner MD  NPI: [id number]      DATE:     Please sign and return via fax to [phone]. Thank you, [facility].

## 2024-05-31 ENCOUNTER — TREATMENT (OUTPATIENT)
Dept: PHYSICAL THERAPY | Facility: CLINIC | Age: 79
End: 2024-05-31
Payer: MEDICARE

## 2024-05-31 DIAGNOSIS — G89.29 CHRONIC BILATERAL LOW BACK PAIN WITHOUT SCIATICA: Primary | ICD-10-CM

## 2024-05-31 DIAGNOSIS — M54.50 CHRONIC BILATERAL LOW BACK PAIN WITHOUT SCIATICA: Primary | ICD-10-CM

## 2024-05-31 NOTE — PROGRESS NOTES
Physical Therapy Daily Treatment Note                          Patient: Ivania Mattson   : 1945  Diagnosis/ICD-10 Code:  Chronic bilateral low back pain without sciatica [M54.50, G89.29]  Referring practitioner: Dillon Gardner, *  Date of Initial Visit: Type: THERAPY  Noted: 2024  Today's Date: 2024  Patient seen for 2 sessions           Subjective   The patient reported no issues performing her HEP but that her chair does not let her do the bridges well.     Objective   See Exercise, Manual, and Modality Logs for complete treatment.     Assessment/Plan     Cuing required for proper STS pre positioning and mechanics. Worked on paraspinal strengthening as well as hip extensors to encourage better posture. Further skilled care required to improve posture and decrease falls. Risk.     Timed:  Manual Therapy:    0     mins  64894;  Therapeutic Exercise:    0     mins  35500;     Neuromuscular Sven:   9    mins  43879;    Therapeutic Activity:     21     mins  15536;     Gait Trainin     mins  86345;     Aquatics                         0      mins  25257    Un-timed:  Mechanical Traction      0     mins  38575  Dry Needling     0     mins self-pay  Electrical Stimulation:    0     mins  51984 ( );      Timed Treatment:   30   mins   Total Treatment:     30   mins    Daryl Cruz PT  Physical Therapist    Electronically signed 2024    [place] License: PT - [id number]

## 2024-06-03 ENCOUNTER — LAB (OUTPATIENT)
Dept: LAB | Facility: HOSPITAL | Age: 79
End: 2024-06-03
Payer: MEDICARE

## 2024-06-03 DIAGNOSIS — E11.9 DIABETES MELLITUS WITH HEMOGLOBIN A1C GOAL OF 7.0%-8.0%: ICD-10-CM

## 2024-06-03 DIAGNOSIS — Z80.0 FAMILY HISTORY OF COLON CANCER: ICD-10-CM

## 2024-06-03 DIAGNOSIS — D50.9 IRON DEFICIENCY ANEMIA, UNSPECIFIED IRON DEFICIENCY ANEMIA TYPE: ICD-10-CM

## 2024-06-03 DIAGNOSIS — M48.062 SPINAL STENOSIS OF LUMBAR REGION WITH NEUROGENIC CLAUDICATION: ICD-10-CM

## 2024-06-03 DIAGNOSIS — E11.40 TYPE 2 DIABETES MELLITUS WITH DIABETIC NEUROPATHY, WITHOUT LONG-TERM CURRENT USE OF INSULIN: ICD-10-CM

## 2024-06-03 DIAGNOSIS — F32.A DEPRESSION, UNSPECIFIED DEPRESSION TYPE: ICD-10-CM

## 2024-06-03 DIAGNOSIS — I50.32 CHRONIC DIASTOLIC CONGESTIVE HEART FAILURE: ICD-10-CM

## 2024-06-03 DIAGNOSIS — G25.81 RESTLESS LEG SYNDROME: ICD-10-CM

## 2024-06-03 DIAGNOSIS — E66.9 OBESITY (BMI 30-39.9): ICD-10-CM

## 2024-06-03 DIAGNOSIS — E55.9 VITAMIN D DEFICIENCY: ICD-10-CM

## 2024-06-03 DIAGNOSIS — I10 ESSENTIAL HYPERTENSION: ICD-10-CM

## 2024-06-03 DIAGNOSIS — E78.2 MIXED HYPERLIPIDEMIA: ICD-10-CM

## 2024-06-03 DIAGNOSIS — G89.4 CHRONIC PAIN SYNDROME: ICD-10-CM

## 2024-06-03 DIAGNOSIS — Z00.00 MEDICARE ANNUAL WELLNESS VISIT, SUBSEQUENT: ICD-10-CM

## 2024-06-03 LAB
25(OH)D3 SERPL-MCNC: 19.4 NG/ML (ref 30–100)
ALBUMIN SERPL-MCNC: 4.1 G/DL (ref 3.5–5.2)
ALBUMIN/GLOB SERPL: 1.5 G/DL
ALP SERPL-CCNC: 81 U/L (ref 39–117)
ALT SERPL W P-5'-P-CCNC: 10 U/L (ref 1–33)
ANION GAP SERPL CALCULATED.3IONS-SCNC: 11.5 MMOL/L (ref 5–15)
AST SERPL-CCNC: 24 U/L (ref 1–32)
BASOPHILS # BLD AUTO: 0.01 10*3/MM3 (ref 0–0.2)
BASOPHILS NFR BLD AUTO: 0.2 % (ref 0–1.5)
BILIRUB SERPL-MCNC: 0.3 MG/DL (ref 0–1.2)
BUN SERPL-MCNC: 12 MG/DL (ref 8–23)
BUN/CREAT SERPL: 14.1 (ref 7–25)
CALCIUM SPEC-SCNC: 9.3 MG/DL (ref 8.6–10.5)
CHLORIDE SERPL-SCNC: 103 MMOL/L (ref 98–107)
CHOLEST SERPL-MCNC: 181 MG/DL (ref 0–200)
CO2 SERPL-SCNC: 27.5 MMOL/L (ref 22–29)
CREAT SERPL-MCNC: 0.85 MG/DL (ref 0.57–1)
DEPRECATED RDW RBC AUTO: 44.5 FL (ref 37–54)
EGFRCR SERPLBLD CKD-EPI 2021: 69.4 ML/MIN/1.73
EOSINOPHIL # BLD AUTO: 0.08 10*3/MM3 (ref 0–0.4)
EOSINOPHIL NFR BLD AUTO: 1.5 % (ref 0.3–6.2)
ERYTHROCYTE [DISTWIDTH] IN BLOOD BY AUTOMATED COUNT: 13.2 % (ref 12.3–15.4)
FOLATE SERPL-MCNC: 11.5 NG/ML (ref 4.78–24.2)
GLOBULIN UR ELPH-MCNC: 2.8 GM/DL
GLUCOSE SERPL-MCNC: 86 MG/DL (ref 65–99)
HBA1C MFR BLD: 5.9 % (ref 4.8–5.6)
HCT VFR BLD AUTO: 44.1 % (ref 34–46.6)
HDLC SERPL-MCNC: 39 MG/DL (ref 40–60)
HGB BLD-MCNC: 14.7 G/DL (ref 12–15.9)
IMM GRANULOCYTES # BLD AUTO: 0.02 10*3/MM3 (ref 0–0.05)
IMM GRANULOCYTES NFR BLD AUTO: 0.4 % (ref 0–0.5)
LDLC SERPL CALC-MCNC: 121 MG/DL (ref 0–100)
LDLC/HDLC SERPL: 3.06 {RATIO}
LYMPHOCYTES # BLD AUTO: 1.86 10*3/MM3 (ref 0.7–3.1)
LYMPHOCYTES NFR BLD AUTO: 33.8 % (ref 19.6–45.3)
MCH RBC QN AUTO: 30.7 PG (ref 26.6–33)
MCHC RBC AUTO-ENTMCNC: 33.3 G/DL (ref 31.5–35.7)
MCV RBC AUTO: 92.1 FL (ref 79–97)
MONOCYTES # BLD AUTO: 0.53 10*3/MM3 (ref 0.1–0.9)
MONOCYTES NFR BLD AUTO: 9.6 % (ref 5–12)
NEUTROPHILS NFR BLD AUTO: 3 10*3/MM3 (ref 1.7–7)
NEUTROPHILS NFR BLD AUTO: 54.5 % (ref 42.7–76)
NRBC BLD AUTO-RTO: 0 /100 WBC (ref 0–0.2)
PLATELET # BLD AUTO: 215 10*3/MM3 (ref 140–450)
PMV BLD AUTO: 10.1 FL (ref 6–12)
POTASSIUM SERPL-SCNC: 4 MMOL/L (ref 3.5–5.2)
PROT SERPL-MCNC: 6.9 G/DL (ref 6–8.5)
RBC # BLD AUTO: 4.79 10*6/MM3 (ref 3.77–5.28)
SODIUM SERPL-SCNC: 142 MMOL/L (ref 136–145)
TRIGL SERPL-MCNC: 113 MG/DL (ref 0–150)
VIT B12 BLD-MCNC: 282 PG/ML (ref 211–946)
VLDLC SERPL-MCNC: 21 MG/DL (ref 5–40)
WBC NRBC COR # BLD AUTO: 5.5 10*3/MM3 (ref 3.4–10.8)

## 2024-06-03 PROCEDURE — 85025 COMPLETE CBC W/AUTO DIFF WBC: CPT

## 2024-06-03 PROCEDURE — 83036 HEMOGLOBIN GLYCOSYLATED A1C: CPT

## 2024-06-03 PROCEDURE — 82746 ASSAY OF FOLIC ACID SERUM: CPT

## 2024-06-03 PROCEDURE — 82306 VITAMIN D 25 HYDROXY: CPT

## 2024-06-03 PROCEDURE — 80053 COMPREHEN METABOLIC PANEL: CPT

## 2024-06-03 PROCEDURE — 36415 COLL VENOUS BLD VENIPUNCTURE: CPT

## 2024-06-03 PROCEDURE — 80061 LIPID PANEL: CPT

## 2024-06-03 PROCEDURE — 82607 VITAMIN B-12: CPT

## 2024-06-06 ENCOUNTER — OFFICE VISIT (OUTPATIENT)
Dept: INTERNAL MEDICINE | Age: 79
End: 2024-06-06
Payer: MEDICARE

## 2024-06-06 VITALS
DIASTOLIC BLOOD PRESSURE: 86 MMHG | BODY MASS INDEX: 31.25 KG/M2 | TEMPERATURE: 98.2 F | OXYGEN SATURATION: 96 % | SYSTOLIC BLOOD PRESSURE: 143 MMHG | HEIGHT: 59 IN | WEIGHT: 155 LBS | HEART RATE: 98 BPM

## 2024-06-06 DIAGNOSIS — I10 ESSENTIAL HYPERTENSION: Primary | ICD-10-CM

## 2024-06-06 DIAGNOSIS — E55.9 VITAMIN D DEFICIENCY: ICD-10-CM

## 2024-06-06 DIAGNOSIS — J44.9 CHRONIC OBSTRUCTIVE PULMONARY DISEASE, UNSPECIFIED COPD TYPE: ICD-10-CM

## 2024-06-06 DIAGNOSIS — F32.A DEPRESSION, UNSPECIFIED DEPRESSION TYPE: ICD-10-CM

## 2024-06-06 DIAGNOSIS — E78.2 MIXED HYPERLIPIDEMIA: ICD-10-CM

## 2024-06-06 DIAGNOSIS — I73.9 PERIPHERAL VASCULAR DISEASE, UNSPECIFIED: ICD-10-CM

## 2024-06-06 DIAGNOSIS — G89.4 CHRONIC PAIN SYNDROME: ICD-10-CM

## 2024-06-06 DIAGNOSIS — E11.40 TYPE 2 DIABETES MELLITUS WITH DIABETIC NEUROPATHY, WITHOUT LONG-TERM CURRENT USE OF INSULIN: ICD-10-CM

## 2024-06-06 DIAGNOSIS — M48.062 SPINAL STENOSIS OF LUMBAR REGION WITH NEUROGENIC CLAUDICATION: ICD-10-CM

## 2024-06-06 DIAGNOSIS — D50.9 IRON DEFICIENCY ANEMIA, UNSPECIFIED IRON DEFICIENCY ANEMIA TYPE: ICD-10-CM

## 2024-06-06 DIAGNOSIS — M47.816 LUMBAR SPONDYLOSIS: ICD-10-CM

## 2024-06-06 PROCEDURE — 99214 OFFICE O/P EST MOD 30 MIN: CPT | Performed by: INTERNAL MEDICINE

## 2024-06-06 PROCEDURE — 3079F DIAST BP 80-89 MM HG: CPT | Performed by: INTERNAL MEDICINE

## 2024-06-06 PROCEDURE — 1126F AMNT PAIN NOTED NONE PRSNT: CPT | Performed by: INTERNAL MEDICINE

## 2024-06-06 PROCEDURE — 3077F SYST BP >= 140 MM HG: CPT | Performed by: INTERNAL MEDICINE

## 2024-06-06 NOTE — PROGRESS NOTES
"CHIEF COMPLAINT/ HPI:  Leg Pain (Routine follow up, lab follow up. Follow up on back and Bilateral legs/ feet. Pt states seeing Dr Braun, was told to wear compression socks. )              Objective   Vital Signs  Vitals:    06/06/24 1518   BP: 143/86   Pulse: 98   Temp: 98.2 °F (36.8 °C)   SpO2: 96%   Weight: 70.3 kg (155 lb)   Height: 149.9 cm (59.02\")      Body mass index is 31.29 kg/m².  Review of Systems back pain, legs are cold cool to touch, dark-colored, concerned,  Physical Exam lungs are clear posterior cardiac exam regular rhythm with 1/6 systolic murmur, her lower legs showed intact skin with a few bruises chronic skin changes, she is got acrocyanotic toes with deformities of the toes with lateral deviation of both MTP joints, she is got a faint 1+ DP pulse on the left, faint 1+ PT pulse on the right, skin is cool and dry, she is alert and oriented using her rollator walker,  Result Review :   Lab Results   Component Value Date    PROBNP 89.7 03/21/2023    PROBNP 90.1 05/25/2022    PROBNP 44.6 10/11/2021    BNP 80 05/20/2019     05/16/2019    BNP 4275 (H) 05/12/2019     CMP          7/31/2023    18:02 9/28/2023    10:55 6/3/2024    10:26   CMP   Glucose 105  79  86    BUN 12  7  12    Creatinine 0.76  0.75  0.85    EGFR 79.8  80.6  69.4    Sodium 138  139  142    Potassium 4.1  4.5  4.0    Chloride 101  101  103    Calcium 9.0  9.4  9.3    Total Protein 7.2  6.7  6.9    Albumin 3.7  4.2  4.1    Globulin 3.5  2.5  2.8    Total Bilirubin 0.4  0.4  0.3    Alkaline Phosphatase 61  55  81    AST (SGOT) 29  26  24    ALT (SGPT) 14  13  10    Albumin/Globulin Ratio 1.1  1.7  1.5    BUN/Creatinine Ratio 15.8  9.3  14.1    Anion Gap 10.7  12.1  11.5      CBC w/diff          7/31/2023    18:02 9/28/2023    10:55 6/3/2024    10:26   CBC w/Diff   WBC 8.81  5.17  5.50    RBC 4.57  5.04  4.79    Hemoglobin 12.8  14.7  14.7    Hematocrit 39.5  43.8  44.1    MCV 86.4  86.9  92.1    MCH 28.0  29.2  30.7    MCHC " 32.4  33.6  33.3    RDW 20.2  16.9  13.2    Platelets 183  195  215    Neutrophil Rel % 71.4  48.3  54.5    Immature Granulocyte Rel % 0.3  0.4  0.4    Lymphocyte Rel % 18.5  41.6  33.8    Monocyte Rel % 9.0  8.1  9.6    Eosinophil Rel % 0.6  1.4  1.5    Basophil Rel % 0.2  0.2  0.2       Lipid Panel          9/28/2023    10:55 6/3/2024    10:26   Lipid Panel   Total Cholesterol 178  181    Triglycerides 136  113    HDL Cholesterol 36  39    VLDL Cholesterol 25  21    LDL Cholesterol  117  121    LDL/HDL Ratio 3.19  3.06       Lab Results   Component Value Date    TSH 1.860 04/14/2023    TSH 2.030 05/25/2022    TSH 1.650 10/11/2021      Lab Results   Component Value Date    FREET4 1.08 05/25/2022    FREET4 1.4 05/10/2019      A1C Last 3 Results          9/28/2023    10:55 6/3/2024    10:26   HGBA1C Last 3 Results   Hemoglobin A1C 6.10  5.90                        Visit Diagnoses:    ICD-10-CM ICD-9-CM   1. Essential hypertension  I10 401.9   2. Lumbar spondylosis  M47.816 721.3   3. Spinal stenosis of lumbar region with neurogenic claudication  M48.062 724.03   4. Mixed hyperlipidemia  E78.2 272.2   5. Type 2 diabetes mellitus with diabetic neuropathy, without long-term current use of insulin  E11.40 250.60     357.2   6. Iron deficiency anemia, unspecified iron deficiency anemia type  D50.9 280.9   7. Chronic pain syndrome  G89.4 338.4   8. Chronic obstructive pulmonary disease, unspecified COPD type  J44.9 496   9. Depression, unspecified depression type  F32.A 311   10. Vitamin D deficiency  E55.9 268.9   11. Peripheral vascular disease, unspecified  I73.9 443.9       Assessment and Plan   Diagnoses and all orders for this visit:    1. Essential hypertension (Primary)  -     CBC & Differential; Future  -     Basic Metabolic Panel; Future  -     Hemoglobin A1c; Future  -     Vitamin B12 anemia; Future  -     Folate anemia; Future  -     Vitamin D,25-Hydroxy; Future  -     Doppler Arterial Multi Level Lower  Extremity - Bilateral CAR; Future    2. Lumbar spondylosis  -     CBC & Differential; Future  -     Basic Metabolic Panel; Future  -     Hemoglobin A1c; Future  -     Vitamin B12 anemia; Future  -     Folate anemia; Future  -     Vitamin D,25-Hydroxy; Future  -     Doppler Arterial Multi Level Lower Extremity - Bilateral CAR; Future    3. Spinal stenosis of lumbar region with neurogenic claudication  -     CBC & Differential; Future  -     Basic Metabolic Panel; Future  -     Hemoglobin A1c; Future  -     Vitamin B12 anemia; Future  -     Folate anemia; Future  -     Vitamin D,25-Hydroxy; Future  -     Doppler Arterial Multi Level Lower Extremity - Bilateral CAR; Future    4. Mixed hyperlipidemia  -     CBC & Differential; Future  -     Basic Metabolic Panel; Future  -     Hemoglobin A1c; Future  -     Vitamin B12 anemia; Future  -     Folate anemia; Future  -     Vitamin D,25-Hydroxy; Future  -     Doppler Arterial Multi Level Lower Extremity - Bilateral CAR; Future    5. Type 2 diabetes mellitus with diabetic neuropathy, without long-term current use of insulin  -     CBC & Differential; Future  -     Basic Metabolic Panel; Future  -     Hemoglobin A1c; Future  -     Vitamin B12 anemia; Future  -     Folate anemia; Future  -     Vitamin D,25-Hydroxy; Future  -     Doppler Arterial Multi Level Lower Extremity - Bilateral CAR; Future    6. Iron deficiency anemia, unspecified iron deficiency anemia type  -     CBC & Differential; Future  -     Basic Metabolic Panel; Future  -     Hemoglobin A1c; Future  -     Vitamin B12 anemia; Future  -     Folate anemia; Future  -     Vitamin D,25-Hydroxy; Future  -     Doppler Arterial Multi Level Lower Extremity - Bilateral CAR; Future    7. Chronic pain syndrome  -     CBC & Differential; Future  -     Basic Metabolic Panel; Future  -     Hemoglobin A1c; Future  -     Vitamin B12 anemia; Future  -     Folate anemia; Future  -     Vitamin D,25-Hydroxy; Future  -     Doppler  Arterial Multi Level Lower Extremity - Bilateral CAR; Future    8. Chronic obstructive pulmonary disease, unspecified COPD type  -     CBC & Differential; Future  -     Basic Metabolic Panel; Future  -     Hemoglobin A1c; Future  -     Vitamin B12 anemia; Future  -     Folate anemia; Future  -     Vitamin D,25-Hydroxy; Future  -     Doppler Arterial Multi Level Lower Extremity - Bilateral CAR; Future    9. Depression, unspecified depression type  -     CBC & Differential; Future  -     Basic Metabolic Panel; Future  -     Hemoglobin A1c; Future  -     Vitamin B12 anemia; Future  -     Folate anemia; Future  -     Vitamin D,25-Hydroxy; Future  -     Doppler Arterial Multi Level Lower Extremity - Bilateral CAR; Future    10. Vitamin D deficiency  -     CBC & Differential; Future  -     Basic Metabolic Panel; Future  -     Hemoglobin A1c; Future  -     Vitamin B12 anemia; Future  -     Folate anemia; Future  -     Vitamin D,25-Hydroxy; Future  -     Doppler Arterial Multi Level Lower Extremity - Bilateral CAR; Future    11. Peripheral vascular disease, unspecified  -     Doppler Arterial Multi Level Lower Extremity - Bilateral CAR; Future        Vision changes double vision, will refer to dr Andrade neurology for second opinion, MRI of the brain March 19, 2024 shows no acute intracranial process small vessel chronic ischemic changes otherwise    Restless legs,--continues  requip 3 mg tid and 10 mg qhs and prn hydocodone , gabapentin 800 mg twice daily ---Status post IV iron June 6 through June 13, 2023 with Venofer      Anemia iron deficiency----- finished IV iron Venofer June 2023, current iron level normal, hemoglobin much improved at 14 September 28, 2023     Peripheral neuropathy--- continues gabapentin at night along with the change in the Requip and iron infusions as of June 29, 2023     Chronic back pain severe at times, makes her nauseated no appetite, has tried different medications pain medications  injections without relief, has been to pain management in the past discussed treatment options going forward September 29, 2023 patient's been to [facility] [place] had injections done also, 2021 December we will get a send in [employer] for her due to chronic pain and worsening pain and upcoming trip September 29, 2023 I sent in Norco 5/3/2025 every 4 hours as needed #25 no refills-----patient self-referred her to [facility] surgeon, and is going through physical therapy now considering surgery to repair or fix previous rods screws in her back, discussed June 6, 2024     Chest pains,  cardiac stress test August 23, 2022, ---showed normal left ventricle size ejection fraction 70% no wall motion abnormalities low risk myocardial perfusion study, with Lexiscan, echocardiogram September 9, 2022 showed normal ejection fraction 68% posterior mitral valve leaflet thickening chordal Gavino was noted, diastolic dysfunction with mild septal asymmetric hypertrophy, chest x-ray no active disease August 27, 2022     Type 2 diabetes hemoglobin A1c -,= 5.9 Viviana 3, 2024, patient stopped metformin, diarrhea improved, urine for microalbumin was normal September 28, 2023--- no treatment currently,     Vitamin B12 deficiency recommend B12 replacement daily, June 6, 2024     left foot pain arterial evaluation March 15, 2022 shows EDEL left and right to be normal,---    Tobacco abuse, discussed, recommend quitting, discussed juventino 6/20/2024     Previous left total knee arthroplasty, laparoscopic cholecystectomy March 2016, previous colonoscopies polypectomies, right breast biopsy, right hip surgery, lap band surgery, ADRIANO, recent back surgery July 2018,     Mixed hyperlipidemia discussed improvement in LDL down to 70 if possible September 2023,--- patient stopped Crestor      Chronic pain, continue Cymbalta, 60 mg daily     Depression anxiety, continues Cymbalta 60 mg daily     Peripheral neuropathy lower legs,---CONT GABAPENTIN 800  Mg  nightly     Previous pelvic fractures     Diverticulosis and diverticulitis in September 2015     small bowel enteritis small bowel perforation--- resolved 2016     Kidney stones     Alcohol use in the past,--has not drank in greater than 4 month as of March 2023--- says she does not drink anymore as of June 2024,     ERCP Common bile duct stone cholelithiasis choledocholithiasis with stent placement and sphincterotomy February 29, 2016, status post laparoscopic cholecystectomy Dr. Huizar     Paroxysmal atrial fibrillation May 2019 with RVR, clinically in sinus rhythm     Hypertension, continues metoprolol XL 25 mg daily,     Obstructive sleep apnea, continue CPAP     Chronic lower extremity edema, patient has stopped taking Lasix 40 mg daily as of September 2023 ---- edema has improved in her legs,  BR NP levels are extremely low at 89, March 2023 ------ recommend compression devices rather than more diuretics given the kidney numbers and heart numbers being good, November 2022                 Follow Up   Return in about 4 months (around 10/6/2024).  Patient was given instructions and counseling regarding her condition or for health maintenance advice. Please see specific information pulled into the AVS if appropriate.

## 2024-06-10 ENCOUNTER — TREATMENT (OUTPATIENT)
Dept: PHYSICAL THERAPY | Facility: CLINIC | Age: 79
End: 2024-06-10
Payer: MEDICARE

## 2024-06-10 DIAGNOSIS — G89.29 CHRONIC BILATERAL LOW BACK PAIN WITHOUT SCIATICA: Primary | ICD-10-CM

## 2024-06-10 DIAGNOSIS — M54.50 CHRONIC BILATERAL LOW BACK PAIN WITHOUT SCIATICA: Primary | ICD-10-CM

## 2024-06-10 PROCEDURE — 97110 THERAPEUTIC EXERCISES: CPT | Performed by: PHYSICAL THERAPIST

## 2024-06-10 PROCEDURE — 97530 THERAPEUTIC ACTIVITIES: CPT | Performed by: PHYSICAL THERAPIST

## 2024-06-10 NOTE — PROGRESS NOTES
Physical Therapy Daily Treatment Note  [address], [address] [place] [address]        Patient: Ivania Mattson   : 1945  Diagnosis/ICD-10 Code:  Chronic bilateral low back pain without sciatica [M54.50, G89.29]  Referring practitioner: Dillon Gardner, *  Date of Initial Visit: Type: THERAPY  Noted: 2024  Today's Date: 6/10/2024  Patient seen for 3 sessions             Subjective   Ivania Mattson reports having 6/10 pain in her lower back upon arrival today.    Objective     Postural Observation:  Pt exhibits significant forward flexed posture with obvious structural deformities of spine noted.  She stands and ambulates with forearms resting walker bilaterally.    See Exercise Logs for complete treatment.       Assessment/Plan    Pt tolerated therapy session well - with progression of therapeutic exercises, CKC-Functional activities, and Manual therapy. She has improved, but continues to have deficits in Her Trunk-Core and Bilateral Hip ROM,  Strength, and Stability; limiting functional mobility and ability to perform ADLs without increased pain or difficulty at this time.  Pt continues to require verbal and tactile cues to improve posture and form during exercise performance and with gait.     Progress per Plan of Care - as tolerated.           Timed:  Manual Therapy:    0     mins  73544;  Therapeutic Exercise:    20     mins  24993;     Neuromuscular Sven:    0    mins  74206;    Therapeutic Activity:     10     mins  12735;     Gait Trainin     mins  84040;     Ultrasound:     0     mins  83651;    Electrical Stimulation:    0     mins  45423;  Iontophoresis     0     mins  92502    Untimed:  Electrical Stimulation:    0     mins  40199 ( );  Mechanical Traction:    0     mins  74558;   Fluidotherapy     0     mins  69333  Hot pack     0     mins  70266  Cold pack     0     mins  78981    Timed Treatment:   30   mins   Total Treatment:     40   mins        Lauren Oneal  PTA  Physical Therapist Assistant

## 2024-06-12 ENCOUNTER — TREATMENT (OUTPATIENT)
Dept: PHYSICAL THERAPY | Facility: CLINIC | Age: 79
End: 2024-06-12
Payer: MEDICARE

## 2024-06-12 DIAGNOSIS — G89.29 CHRONIC BILATERAL LOW BACK PAIN WITHOUT SCIATICA: Primary | ICD-10-CM

## 2024-06-12 DIAGNOSIS — M54.50 CHRONIC BILATERAL LOW BACK PAIN WITHOUT SCIATICA: Primary | ICD-10-CM

## 2024-06-12 NOTE — PROGRESS NOTES
Physical Therapy Daily Treatment Note  [address], [address] [place] [address]        Patient: Ivania Mattson   : 1945  Diagnosis/ICD-10 Code:  Chronic bilateral low back pain without sciatica [M54.50, G89.29]  Referring practitioner: Dillon Gardner, *  Date of Initial Visit: Type: THERAPY  Noted: 2024  Today's Date: 2024  Patient seen for 4 sessions             Subjective   Ivania Mattson reports that she has an appointment at [facility] for her back in a couple of weeks.  She states that she can only stand for a few minutes to do food prep and cooking at home.  She states that her back pain increases and it gets weak.    Objective       See Exercise Logs for complete treatment.       Assessment/Plan    Pt tolerated therapy session well - with progression of therapeutic exercises, CKC-Functional activities, and Manual therapy. She has improved, but continues to have deficits in Her Trunk-Core and Bilateral Hip ROM,  Strength, and Stability; limiting functional mobility and ability to perform ADLs without increased pain or difficulty at this time.  Pt continues to require verbal and tactile cues to improve posture and form during exercise performance and with gait.      Progress per Plan of Care - as tolerated.                 Timed:  Manual Therapy:    0     mins  55214;  Therapeutic Exercise:    20     mins  33651;     Neuromuscular Sven:    0    mins  71354;    Therapeutic Activity:     10     mins  64744;     Gait Trainin     mins  40953;     Ultrasound:     0     mins  94430;    Electrical Stimulation:    0     mins  41597;  Iontophoresis     0     mins  04910    Untimed:  Electrical Stimulation:    0     mins  67516 ( );  Mechanical Traction:    0     mins  35340;   Fluidotherapy     0     mins  32240  Hot pack     0     mins  55490  Cold pack     0     mins  42168    Timed Treatment:   30   mins   Total Treatment:     45   mins        Lauren Oneal PTA  Physical  Therapist Assistant

## 2024-06-17 ENCOUNTER — TELEPHONE (OUTPATIENT)
Dept: INTERNAL MEDICINE | Age: 79
End: 2024-06-17

## 2024-06-17 ENCOUNTER — TREATMENT (OUTPATIENT)
Dept: PHYSICAL THERAPY | Facility: CLINIC | Age: 79
End: 2024-06-17
Payer: MEDICARE

## 2024-06-17 DIAGNOSIS — G89.29 CHRONIC BILATERAL LOW BACK PAIN WITHOUT SCIATICA: Primary | ICD-10-CM

## 2024-06-17 DIAGNOSIS — M54.50 CHRONIC BILATERAL LOW BACK PAIN WITHOUT SCIATICA: Primary | ICD-10-CM

## 2024-06-17 NOTE — TELEPHONE ENCOUNTER
Patient states the [place] Doctor wants her to have a CT of the cervical, thoracic and lumbar spine.

## 2024-06-17 NOTE — PROGRESS NOTES
Physical Therapy Daily Treatment Note                          Patient: Ivania Mattson   : 1945  Diagnosis/ICD-10 Code:  Chronic bilateral low back pain without sciatica [M54.50, G89.29]  Referring practitioner: Dillon Gardner, *  Date of Initial Visit: Type: THERAPY  Noted: 2024  Today's Date: 2024  Patient seen for 5 sessions           Subjective   The patient reported she is going to get scheduled for a CT and has been talking with [facility] about a potential back surgery.      Objective   See Exercise, Manual, and Modality Logs for complete treatment.     Assessment/Plan     Continued gradual progression of strengthening with focus on paraspinal strength and postural training. No adverse affects or increased pain reported with adequate fatigue observed. Further skilled care required to decreased falls risk and improve posture.    Timed:  Manual Therapy:    0     mins  68815;  Therapeutic Exercise:    15     mins  18281;     Neuromuscular Sven:   9    mins  55224;    Therapeutic Activity:     15     mins  02562;     Gait Trainin     mins  83368;     Aquatics                         0      mins  90375    Un-timed:  Mechanical Traction      0     mins  66694  Dry Needling     0     mins self-pay  Electrical Stimulation:    0     mins  00030 ( );      Timed Treatment:   39   mins   Total Treatment:     39   mins    Daryl Cruz PT  Physical Therapist    Electronically signed 2024    [place] License: PT - [id number]

## 2024-06-17 NOTE — TELEPHONE ENCOUNTER
Caller: Ivania Mattson    Relationship: Self    Best call back number: [phone]     What orders are you requesting (i.e. lab or imaging): CT SCAN OF ENTIRE SPINE WITHOUT CONTRAST    In what timeframe would the patient need to come in: ASAP    Where will you receive your lab/imaging services: [facility]

## 2024-06-19 ENCOUNTER — TREATMENT (OUTPATIENT)
Dept: PHYSICAL THERAPY | Facility: CLINIC | Age: 79
End: 2024-06-19
Payer: MEDICARE

## 2024-06-19 DIAGNOSIS — M54.50 CHRONIC BILATERAL LOW BACK PAIN WITHOUT SCIATICA: Primary | ICD-10-CM

## 2024-06-19 DIAGNOSIS — G89.29 CHRONIC BILATERAL LOW BACK PAIN WITHOUT SCIATICA: Primary | ICD-10-CM

## 2024-06-19 NOTE — PROGRESS NOTES
Physical Therapy Daily Treatment Note                          Patient: Ivania Mattson   : 1945  Diagnosis/ICD-10 Code:  Chronic bilateral low back pain without sciatica [M54.50, G89.29]  Referring practitioner: Dillon Gardner, *  Date of Initial Visit: Type: THERAPY  Noted: 2024  Today's Date: 2024  Patient seen for 6 sessions           Subjective   The patient reported doing water aerobics yesterday and that the back is doing well.     Objective   See Exercise, Manual, and Modality Logs for complete treatment.     Assessment/Plan     Continued work on paraspinal strengthening and endurance. No increased pain reported throughout. Cuing required during standing tasks to encourage hip ext and upright posture.     Timed:  Manual Therapy:    0     mins  36594;  Therapeutic Exercise:    0     mins  31191;     Neuromuscular Sven:   8    mins  69090;    Therapeutic Activity:     21     mins  85655;     Gait Trainin     mins  55651;     Aquatics                         0      mins  96231    Un-timed:  Mechanical Traction      0     mins  90179  Dry Needling     0     mins self-pay  Electrical Stimulation:    0     mins  10130 ( );      Timed Treatment:   29   mins   Total Treatment:     29   mins    Daryl Cruz PT  Physical Therapist    Electronically signed 2024    [place] License: PT - [id number]

## 2024-06-20 ENCOUNTER — OFFICE VISIT (OUTPATIENT)
Dept: NEUROLOGY | Facility: CLINIC | Age: 79
End: 2024-06-20
Payer: MEDICARE

## 2024-06-20 VITALS
HEART RATE: 108 BPM | DIASTOLIC BLOOD PRESSURE: 76 MMHG | SYSTOLIC BLOOD PRESSURE: 116 MMHG | BODY MASS INDEX: 31.83 KG/M2 | HEIGHT: 59 IN | WEIGHT: 157.9 LBS

## 2024-06-20 DIAGNOSIS — H04.123 DRY EYE SYNDROME OF BOTH EYES: Primary | ICD-10-CM

## 2024-06-20 PROCEDURE — 1160F RVW MEDS BY RX/DR IN RCRD: CPT | Performed by: PSYCHIATRY & NEUROLOGY

## 2024-06-20 PROCEDURE — 3078F DIAST BP <80 MM HG: CPT | Performed by: PSYCHIATRY & NEUROLOGY

## 2024-06-20 PROCEDURE — 1159F MED LIST DOCD IN RCRD: CPT | Performed by: PSYCHIATRY & NEUROLOGY

## 2024-06-20 PROCEDURE — 3074F SYST BP LT 130 MM HG: CPT | Performed by: PSYCHIATRY & NEUROLOGY

## 2024-06-20 PROCEDURE — 99204 OFFICE O/P NEW MOD 45 MIN: CPT | Performed by: PSYCHIATRY & NEUROLOGY

## 2024-06-20 RX ORDER — ASCORBIC ACID 125 MG
5000 TABLET,CHEWABLE ORAL DAILY
COMMUNITY

## 2024-06-20 RX ORDER — PYRIDOSTIGMINE BROMIDE 60 MG/1
TABLET ORAL
Qty: 90 TABLET | Refills: 2 | Status: SHIPPED | OUTPATIENT
Start: 2024-06-20 | End: 2024-06-20 | Stop reason: SINTOL

## 2024-06-20 NOTE — ASSESSMENT & PLAN NOTE
My diagnosis is that she has dry eye syndrome and not true diplopia even though she tells me that her vision gets better by closing 1 eye.  It happens 100 times a day suggesting that this is not neuromuscular disease without ptosis and can be relieved by blink her eyes.  I initially discussed with them that I will try her on Mestinon however the adverse effects of Mestinon outweighs the benefits.  I will call her tomorrow and discussed with her the change of diagnosis.  Thank you for letting me participate in her care.

## 2024-06-20 NOTE — PROGRESS NOTES
"Chief Complaint  Diplopia (MRI completed @ [facility] 03/19/2024/ACH Lab completed 04/2023 [facility])    Subjective          Ivania Mattson is a 78 y.o. female who presents to [facility]  History of Present Illness  78-year-old woman evaluated for double vision.  She states that she had surgery 6 years ago for her back and when she came to she had double vision since then.  She states that she gets double vision several times a day especially if she is looking at something very intensely and she sees 2 things or 2 lines.  It also gets blurry at times.  They tried to do prisms and it did not work.  She states that if he closes 1 eye or the other the vision becomes 1.  It does not matter which direction she looks.  She states that the vision usually becomes clear after she blinks her eyes.  She has no ptosis.  She has no swallowing problems.  She has no weakness of her upper or lower extremities.  Objective   Vital Signs:   /76 (BP Location: Right arm, Patient Position: Sitting, Cuff Size: Adult)   Pulse 108   Ht 149.9 cm (59\")   Wt 71.6 kg (157 lb 14.4 oz)   BMI 31.89 kg/m²     Physical Exam   She is alert, fluent phasic, follows commands well.  EMs are full directions gaze.  There is no double vision on looking at either direction.  Looking up for 1-1/2 minutes did not produce double vision or ptosis.  There is no facial weakness.  Soft palate elevation and tongue are normal.  There is no weakness of the upper or lower extremities.  She is using a walker to ambulate.        Assessment and Plan  Diagnoses and all orders for this visit:    1. Dry eye syndrome of both eyes (Primary)  Assessment & Plan:  My diagnosis is that she has dry eye syndrome and not true diplopia even though she tells me that her vision gets better by closing 1 eye.  It happens 100 times a day suggesting that this is not neuromuscular disease without ptosis and can be relieved by blink her eyes.  I " initially discussed with them that I will try her on Mestinon however the adverse effects of Mestinon outweighs the benefits.  I will call her tomorrow and discussed with her the change of diagnosis.  Thank you for letting me participate in her care.      Other orders  -     Discontinue: pyridostigmine (MESTINON) 60 MG tablet; 1/2 tablet 3 times a day at 8 AM, 12 noon and 4 PM x 1 week and then 1 tablet 3 times a day thereafter if no improvement of symptoms.  Dispense: 90 tablet; Refill: 2         Total time spent with the patient and coordinating patient care was 45 minutes.    Follow Up  No follow-ups on file.  Patient was given instructions and counseling regarding her condition or for health maintenance advice. Please see specific information pulled into the AVS if appropriate.

## 2024-06-21 ENCOUNTER — TELEPHONE (OUTPATIENT)
Dept: NEUROLOGY | Facility: CLINIC | Age: 79
End: 2024-06-21

## 2024-06-21 NOTE — TELEPHONE ENCOUNTER
----- Message from Klaus Andrade sent at 6/20/2024  4:51 PM EDT -----  Call patient and tell her that I change my mind giving her Mestinon.  Tell her that it has too much side effects with her condition.  I can try her on low-dose steroids for 2 or 3 weeks to see if it helps her symptoms instead.  I change my mind again.  I think her problem is related to her tear ducts and nothing to do with the neuromuscular junction.  Please call her and I will talk to her tomorrow.

## 2024-06-21 NOTE — TELEPHONE ENCOUNTER
Spoke with pt on the phone and let her know per Dr. Andrade that he is not going to prescribe medtinon. He feels her concern is not neurologic. He recommends she see her eye physician and let them do an assessment. Let pt know I would send a My Chart message if Dr. Andrade prescribes steroids as she is agreeable.     Please advise if you plan to send the steroids to her pharmacy.    Peng Advancement Flap Text: The defect edges were debeveled with a #15 scalpel blade.  Given the location of the defect, shape of the defect and the proximity to free margins a Peng advancement flap was deemed most appropriate.  Using a sterile surgical marker, an appropriate advancement flap was drawn incorporating the defect and placing the expected incisions within the relaxed skin tension lines where possible. The area thus outlined was incised deep to adipose tissue with a #15 scalpel blade.  The skin margins were undermined to an appropriate distance in all directions utilizing iris scissors.

## 2024-06-26 ENCOUNTER — TREATMENT (OUTPATIENT)
Dept: PHYSICAL THERAPY | Facility: CLINIC | Age: 79
End: 2024-06-26
Payer: MEDICARE

## 2024-06-26 DIAGNOSIS — M54.50 CHRONIC BILATERAL LOW BACK PAIN WITHOUT SCIATICA: Primary | ICD-10-CM

## 2024-06-26 DIAGNOSIS — G89.29 CHRONIC BILATERAL LOW BACK PAIN WITHOUT SCIATICA: Primary | ICD-10-CM

## 2024-06-26 NOTE — PROGRESS NOTES
Progress Note      Patient: Ivania Mattson   : 1945  Diagnosis/ICD-10 Code:  Chronic bilateral low back pain without sciatica [M54.50, G89.29]  Referring practitioner: Dillon Gardner, *  Date of Initial Visit: Type: THERAPY  Noted: 2024  Today's Date: 2024  Patient seen for 7 sessions      Subjective:   Subjective Questionnaire: Oswestry: 17  Clinical Progress: improved  Home Program Compliance: Yes  Treatment has included: therapeutic exercise, neuromuscular re-education, therapeutic activity, and gait training    Subjective   Patient states she is continuing to work hard on her posture and feels it is improving and she feels stronger. She feels her balance still needs to improve.   Objective          Active Range of Motion     Lumbar   Flexion: 100 (%) degrees   Extension: 35 (%) degrees   Left lateral flexion: 85 (%) degrees   Right lateral flexion: 85 (%) degrees     Strength/Myotome Testing     Left Hip   Planes of Motion   Flexion: 5  Extension: 5  Abduction: 4  Adduction: 4+    Right Hip   Planes of Motion   Flexion: 4+  Extension: 5  Abduction: 4  Adduction: 4+    Left Knee   Flexion: 5  Extension: 5    Right Knee   Flexion: 5  Extension: 4+    Left Ankle/Foot   Dorsiflexion: 5    Right Ankle/Foot   Dorsiflexion: 5    Functional Assessment     Single Leg Stance   Left: 3 seconds  Right: 3 seconds    Comments  5x STS: 17.00s      See Exercise, Manual, and Modality Logs for complete treatment.     Assessment/Plan  Patient has progressed well since beginning care and has met 2 of her short term goals. Notable increases in lumbar ROM and hip strength achieved allowing for increase standing tolerance for cooking. Balance deficits remain creating a risk of fall for patient. ROM, strength, and postural deficits deficits remain but are progressing towards LTGs. Further skilled care required at this time to decrease falls risk and improve function for home care tasks, mobility tasks, and  ADLs.     Progress toward previous goals: Partially Met   1. The patient complains of low back pain.  LTG 1: 12 weeks:  The patient will report a pain rating of 2 or better in order to improve  tolerance to activities of daily living and improve sleep quality.  STATUS: progressing  STG 1a: 6 weeks:  The patient will report a pain rating of 4 or better.  STATUS: met    2. The patient demonstrates weakness of the hip.  LTG 2: 12 weeks:  The patient will demonstrate 4+ /5 strength for hip flexion, abduction,  and extension in order to improve hip stability.  STATUS:  progressing  STG 2a: 6 weeks:  The patient will demonstrate 4 /5 strength for hip flexion, abduction,  and extension.  STATUS:  met    3. The patient has limited lumbar AROM  LTG 4: 12 weeks:  The patient will demonstrate lumbar AROM as follows: 75% of flexion and 75% of extension.  STATUS:  progressing  STG 4a: 6 weeks: The patient will demonstrate lumbar AROM as follows: 65% of flexion and 65% of extension.  STATUS:  progressing    4. Mobility: Walking/Moving Around Functional Limitation    LTG 4: 12 weeks:  The patient will demonstrate improved function by achieving a score of 5 on the NIYA.  STATUS:  progressing  STG 4 a: 6 weeks:  The patient will demonstrate improved function by achieving a score of 10 on the NIYA.    STATUS:  progressing    Recommendations: Continue as planned  Timeframe: 6 weeks  Prognosis to achieve goals: good    PT Signature: Daryl Cruz PT    Electronically signed 2024    [place] License: PT - [id number]       Timed:  Manual Therapy:    0     mins  82147;  Therapeutic Exercise:    14     mins  39604;     Neuromuscular Sven:    0    mins  30200;    Therapeutic Activity:     17     mins  34893;     Gait Trainin     mins  27828;     Aquatics                         0      mins  77298    Un-timed:  Mechanical Traction      0     mins  57253  Dry Needling     0     mins self-pay  Electrical Stimulation:    0     mins  93571 (  );    Timed Treatment:   31   mins   Total Treatment:     31   mins

## 2024-06-28 ENCOUNTER — TREATMENT (OUTPATIENT)
Dept: PHYSICAL THERAPY | Facility: CLINIC | Age: 79
End: 2024-06-28
Payer: MEDICARE

## 2024-06-28 DIAGNOSIS — G89.29 CHRONIC BILATERAL LOW BACK PAIN WITHOUT SCIATICA: Primary | ICD-10-CM

## 2024-06-28 DIAGNOSIS — M54.50 CHRONIC BILATERAL LOW BACK PAIN WITHOUT SCIATICA: Primary | ICD-10-CM

## 2024-06-28 NOTE — PROGRESS NOTES
"Physical Therapy Daily Treatment Note  [address] [place] [address]        Patient: Ivania Mattson   : 1945  Diagnosis/ICD-10 Code:  Chronic bilateral low back pain without sciatica [M54.50, G89.29]  Referring practitioner: Dillon Gardner, *  Date of Initial Visit: Type: THERAPY  Noted: 2024  Today's Date: 2024  Patient seen for 8 sessions             Subjective   Ivania Mattson reports feeling \"Tired\" upon arrival today.  She reports that she had done her Walking prior to therapy session today.  She reports walking with a group of friends about 3 times a week.    Objective     Postural Observation:  Pt exhibits significant forward flexed posture with obvious structural deformities of spine noted.  She stands and ambulates with forearms resting walker bilaterally.     See Exercise Logs for complete treatment.           Assessment/Plan    Pt tolerated therapy session well - with progression of therapeutic exercises, CKC-Functional activities, and Manual therapy. She has improved, but continues to have deficits in Her Trunk-Core and Bilateral Hip ROM,  Strength, and Stability; limiting functional mobility and ability to perform ADLs without increased pain or difficulty at this time.  Pt continues to require verbal and tactile cues to improve posture and form during exercise performance and with gait.  Pt doing well with functional Trunk postural stabilization progression.  Pt denied having any increase in pain during or post session today.     Progress per Plan of Care - as tolerated.                  Timed:  Manual Therapy:    0     mins  19313;  Therapeutic Exercise:    38     mins  03459;     Neuromuscular Sven:    0    mins  49533;    Therapeutic Activity:     15     mins  62636;     Gait Trainin     mins  81961;     Ultrasound:     0     mins  56690;    Electrical Stimulation:    0     mins  71490;  Iontophoresis     0     mins  52340    Untimed:  Electrical " Stimulation:    0     mins  42418 ( );  Mechanical Traction:    0     mins  03477;   Fluidotherapy     0     mins  42714  Hot pack     0     mins  80152  Cold pack     0     mins  53092    Timed Treatment:   53   mins   Total Treatment:     53   mins        Lauren Oneal PTA  Physical Therapist Assistant

## 2024-07-01 ENCOUNTER — TREATMENT (OUTPATIENT)
Dept: PHYSICAL THERAPY | Facility: CLINIC | Age: 79
End: 2024-07-01
Payer: MEDICARE

## 2024-07-01 DIAGNOSIS — M54.50 CHRONIC BILATERAL LOW BACK PAIN WITHOUT SCIATICA: Primary | ICD-10-CM

## 2024-07-01 DIAGNOSIS — G89.29 CHRONIC BILATERAL LOW BACK PAIN WITHOUT SCIATICA: Primary | ICD-10-CM

## 2024-07-01 NOTE — PROGRESS NOTES
Physical Therapy Daily Treatment Note                          Patient: Ivania Mattson   : 1945  Diagnosis/ICD-10 Code:  Chronic bilateral low back pain without sciatica [M54.50, G89.29]  Referring practitioner: Dillon Gardner, *  Date of Initial Visit: Type: THERAPY  Noted: 2024  Today's Date: 2024  Patient seen for 9 sessions           Subjective   The patient reported she is tired from walking this morning.     Objective   See Exercise, Manual, and Modality Logs for complete treatment.     Assessment/Plan     Continued work on dynamic balance, postural training, and strengthening. Her balance remains at a deficit, continued to reinforced with patient importance of SL balance activityin HEP. Further skilled care required at this time.     Timed:  Manual Therapy:    0     mins  46846;  Therapeutic Exercise:    0     mins  62806;     Neuromuscular Sven:   10    mins  75827;    Therapeutic Activity:     20     mins  52740;     Gait Trainin     mins  57288;     Aquatics                         0      mins  09577    Un-timed:  Mechanical Traction      0     mins  17003  Dry Needling     0     mins self-pay  Electrical Stimulation:    0     mins  72332 ( );      Timed Treatment:   30   mins   Total Treatment:     30   mins    Daryl Cruz PT  Physical Therapist    Electronically signed 2024    [place] License: PT - [id number]

## 2024-07-03 ENCOUNTER — TREATMENT (OUTPATIENT)
Dept: PHYSICAL THERAPY | Facility: CLINIC | Age: 79
End: 2024-07-03
Payer: MEDICARE

## 2024-07-03 DIAGNOSIS — M54.50 CHRONIC BILATERAL LOW BACK PAIN WITHOUT SCIATICA: Primary | ICD-10-CM

## 2024-07-03 DIAGNOSIS — G89.29 CHRONIC BILATERAL LOW BACK PAIN WITHOUT SCIATICA: Primary | ICD-10-CM

## 2024-07-03 NOTE — PROGRESS NOTES
Physical Therapy Daily Treatment Note                          Patient: Ivania Mattson   : 1945  Diagnosis/ICD-10 Code:  Chronic bilateral low back pain without sciatica [M54.50, G89.29]  Referring practitioner: Dillon Gardner, *  Date of Initial Visit: Type: THERAPY  Noted: 2024  Today's Date: 7/3/2024  Patient seen for 10 sessions           Subjective   The patient reported no new complaints today and no current pain to report.     Objective   See Exercise, Manual, and Modality Logs for complete treatment.     Assessment/Plan     Continued work on balance and postural training. Vcs required throughout for upright posture. No increased pain reported throughout. Initiated gait training w/o AD with cues required for step height and posture. Further skilled care require to meet LTGs.     Timed:  Manual Therapy:    0     mins  92078;  Therapeutic Exercise:    0     mins  26023;     Neuromuscular Sven:   10    mins  23773;    Therapeutic Activity:     20     mins  89493;     Gait Training:      10     mins  68363;     Aquatics                         0      mins  96465    Un-timed:  Mechanical Traction      0     mins  94427  Dry Needling     0     mins self-pay  Electrical Stimulation:    0     mins  06502 ( );      Timed Treatment:   40   mins   Total Treatment:     40   mins    Daryl Cruz PT  Physical Therapist    Electronically signed 7/3/2024    [place] License: PT - [id number]

## 2024-07-08 ENCOUNTER — TREATMENT (OUTPATIENT)
Dept: PHYSICAL THERAPY | Facility: CLINIC | Age: 79
End: 2024-07-08
Payer: MEDICARE

## 2024-07-08 DIAGNOSIS — G89.29 CHRONIC BILATERAL LOW BACK PAIN WITHOUT SCIATICA: Primary | ICD-10-CM

## 2024-07-08 DIAGNOSIS — M54.50 CHRONIC BILATERAL LOW BACK PAIN WITHOUT SCIATICA: Primary | ICD-10-CM

## 2024-07-08 NOTE — PROGRESS NOTES
Physical Therapy Daily Treatment Note                          Patient: Ivania Mattson   : 1945  Diagnosis/ICD-10 Code:  Chronic bilateral low back pain without sciatica [M54.50, G89.29]  Referring practitioner: Dillon Gardner, *  Date of Initial Visit: Type: THERAPY  Noted: 2024  Today's Date: 2024  Patient seen for 11 sessions           Subjective   The patient reported no new complaints, she feels good today as she had a nap prior to txt.     Objective   See Exercise, Manual, and Modality Logs for complete treatment.     Assessment/Plan     Continued regular programming with cuing required at intervals for upright posture. Patient progressing well overall. Plan to continue per POC    Timed:  Manual Therapy:    0     mins  44136;  Therapeutic Exercise:    15     mins  00634;     Neuromuscular Sven:   0    mins  67010;    Therapeutic Activity:     15     mins  50148;     Gait Trainin     mins  83195;     Aquatics                         0      mins  13305    Un-timed:  Mechanical Traction      0     mins  27940  Dry Needling     0     mins self-pay  Electrical Stimulation:    0     mins  82447 ( );      Timed Treatment:   30   mins   Total Treatment:     30   mins    Daryl Cruz PT  Physical Therapist    Electronically signed 2024    [place] License: PT - [id number]

## 2024-07-09 ENCOUNTER — HOSPITAL ENCOUNTER (OUTPATIENT)
Dept: CARDIOLOGY | Facility: HOSPITAL | Age: 79
Discharge: HOME | End: 2024-07-09
Admitting: INTERNAL MEDICINE
Payer: MEDICARE

## 2024-07-09 DIAGNOSIS — E55.9 VITAMIN D DEFICIENCY: ICD-10-CM

## 2024-07-09 DIAGNOSIS — M48.062 SPINAL STENOSIS OF LUMBAR REGION WITH NEUROGENIC CLAUDICATION: ICD-10-CM

## 2024-07-09 DIAGNOSIS — E11.40 TYPE 2 DIABETES MELLITUS WITH DIABETIC NEUROPATHY, WITHOUT LONG-TERM CURRENT USE OF INSULIN: ICD-10-CM

## 2024-07-09 DIAGNOSIS — E78.2 MIXED HYPERLIPIDEMIA: ICD-10-CM

## 2024-07-09 DIAGNOSIS — F32.A DEPRESSION, UNSPECIFIED DEPRESSION TYPE: ICD-10-CM

## 2024-07-09 DIAGNOSIS — I73.9 PERIPHERAL VASCULAR DISEASE, UNSPECIFIED: ICD-10-CM

## 2024-07-09 DIAGNOSIS — D50.9 IRON DEFICIENCY ANEMIA, UNSPECIFIED IRON DEFICIENCY ANEMIA TYPE: ICD-10-CM

## 2024-07-09 DIAGNOSIS — G89.4 CHRONIC PAIN SYNDROME: ICD-10-CM

## 2024-07-09 DIAGNOSIS — I10 ESSENTIAL HYPERTENSION: ICD-10-CM

## 2024-07-09 DIAGNOSIS — J44.9 CHRONIC OBSTRUCTIVE PULMONARY DISEASE, UNSPECIFIED COPD TYPE: ICD-10-CM

## 2024-07-09 DIAGNOSIS — M47.816 LUMBAR SPONDYLOSIS: ICD-10-CM

## 2024-07-09 LAB
BH CV LOWER ARTERIAL LEFT ABI RATIO: 1.1
BH CV LOWER ARTERIAL LEFT DORSALIS PEDIS SYS MAX: 155
BH CV LOWER ARTERIAL LEFT GREAT TOE SYS MAX: 125
BH CV LOWER ARTERIAL LEFT POST TIBIAL SYS MAX: 144
BH CV LOWER ARTERIAL LEFT TBI RATIO: 0.89
BH CV LOWER ARTERIAL RIGHT ABI RATIO: 1.09
BH CV LOWER ARTERIAL RIGHT DORSALIS PEDIS SYS MAX: 151
BH CV LOWER ARTERIAL RIGHT GREAT TOE SYS MAX: 130
BH CV LOWER ARTERIAL RIGHT POST TIBIAL SYS MAX: 154
BH CV LOWER ARTERIAL RIGHT TBI RATIO: 0.92
UPPER ARTERIAL LEFT ARM BRACHIAL SYS MAX: 136
UPPER ARTERIAL RIGHT ARM BRACHIAL SYS MAX: 141

## 2024-07-09 PROCEDURE — 93922 UPR/L XTREMITY ART 2 LEVELS: CPT | Performed by: SURGERY

## 2024-07-09 PROCEDURE — 93922 UPR/L XTREMITY ART 2 LEVELS: CPT

## 2024-07-10 ENCOUNTER — TREATMENT (OUTPATIENT)
Dept: PHYSICAL THERAPY | Facility: CLINIC | Age: 79
End: 2024-07-10
Payer: MEDICARE

## 2024-07-10 DIAGNOSIS — M54.50 CHRONIC BILATERAL LOW BACK PAIN WITHOUT SCIATICA: Primary | ICD-10-CM

## 2024-07-10 DIAGNOSIS — G89.29 CHRONIC BILATERAL LOW BACK PAIN WITHOUT SCIATICA: Primary | ICD-10-CM

## 2024-07-11 ENCOUNTER — OFFICE VISIT (OUTPATIENT)
Dept: INTERNAL MEDICINE | Age: 79
End: 2024-07-11
Payer: MEDICARE

## 2024-07-11 VITALS
OXYGEN SATURATION: 90 % | WEIGHT: 159 LBS | TEMPERATURE: 98.2 F | BODY MASS INDEX: 32.05 KG/M2 | HEART RATE: 90 BPM | HEIGHT: 59 IN | DIASTOLIC BLOOD PRESSURE: 80 MMHG | SYSTOLIC BLOOD PRESSURE: 123 MMHG

## 2024-07-11 DIAGNOSIS — M48.062 SPINAL STENOSIS OF LUMBAR REGION WITH NEUROGENIC CLAUDICATION: ICD-10-CM

## 2024-07-11 DIAGNOSIS — G89.29 CHRONIC BILATERAL THORACIC BACK PAIN: ICD-10-CM

## 2024-07-11 DIAGNOSIS — G89.4 CHRONIC PAIN SYNDROME: ICD-10-CM

## 2024-07-11 DIAGNOSIS — M54.6 CHRONIC BILATERAL THORACIC BACK PAIN: ICD-10-CM

## 2024-07-11 DIAGNOSIS — M06.9 RHEUMATOID ARTHRITIS, INVOLVING UNSPECIFIED SITE, UNSPECIFIED WHETHER RHEUMATOID FACTOR PRESENT: ICD-10-CM

## 2024-07-11 DIAGNOSIS — M51.36 DEGENERATION OF LUMBAR INTERVERTEBRAL DISC: ICD-10-CM

## 2024-07-11 DIAGNOSIS — M54.30 SCIATIC LEG PAIN: Primary | ICD-10-CM

## 2024-07-11 PROCEDURE — 99214 OFFICE O/P EST MOD 30 MIN: CPT | Performed by: INTERNAL MEDICINE

## 2024-07-11 PROCEDURE — 3079F DIAST BP 80-89 MM HG: CPT | Performed by: INTERNAL MEDICINE

## 2024-07-11 PROCEDURE — 1126F AMNT PAIN NOTED NONE PRSNT: CPT | Performed by: INTERNAL MEDICINE

## 2024-07-11 PROCEDURE — 3074F SYST BP LT 130 MM HG: CPT | Performed by: INTERNAL MEDICINE

## 2024-07-11 NOTE — PROGRESS NOTES
"CHIEF COMPLAINT/ HPI:  Pain (Pt states [facility] is asking for a CT of L spine, C Spine and T Spine. Follow up on Doppler, Cardiovascular. Pt is asking for recheck on Vitamin D and B. )    Needs orders for C-spine CAT scan of the neck thoracic and lumbar, through [facility]          Objective   Vital Signs  Vitals:    07/11/24 1314   BP: 123/80   Pulse: 90   Temp: 98.2 °F (36.8 °C)   SpO2: 90%   Weight: 72.1 kg (159 lb)   Height: 149.9 cm (59.02\")      Body mass index is 32.1 kg/m².  Review of Systems back and neck pain  Physical Exam  Neurological:      Mental Status: She is oriented to person, place, and time.   Psychiatric:         Mood and Affect: Mood normal.         Behavior: Behavior normal.     Patient has kyphoscoliosis with fairly large dowagers type hump over the upper thoracic lower cervical area with minimal tenderness she has negative straight leg raises bilateral no significant edema in the lower legs she is alert and oriented x 3 uses a rolling rollator type walker for ambulation assistance,  Result Review :   Lab Results   Component Value Date    PROBNP 89.7 03/21/2023    PROBNP 90.1 05/25/2022    PROBNP 44.6 10/11/2021    BNP 80 05/20/2019     05/16/2019    BNP 4275 (H) 05/12/2019     CMP          7/31/2023    18:02 9/28/2023    10:55 6/3/2024    10:26   CMP   Glucose 105  79  86    BUN 12  7  12    Creatinine 0.76  0.75  0.85    EGFR 79.8  80.6  69.4    Sodium 138  139  142    Potassium 4.1  4.5  4.0    Chloride 101  101  103    Calcium 9.0  9.4  9.3    Total Protein 7.2  6.7  6.9    Albumin 3.7  4.2  4.1    Globulin 3.5  2.5  2.8    Total Bilirubin 0.4  0.4  0.3    Alkaline Phosphatase 61  55  81    AST (SGOT) 29  26  24    ALT (SGPT) 14  13  10    Albumin/Globulin Ratio 1.1  1.7  1.5    BUN/Creatinine Ratio 15.8  9.3  14.1    Anion Gap 10.7  12.1  11.5      CBC w/diff          7/31/2023    18:02 9/28/2023    10:55 6/3/2024    10:26   CBC w/Diff   WBC 8.81  5.17  5.50    RBC 4.57  5.04  " 4.79    Hemoglobin 12.8  14.7  14.7    Hematocrit 39.5  43.8  44.1    MCV 86.4  86.9  92.1    MCH 28.0  29.2  30.7    MCHC 32.4  33.6  33.3    RDW 20.2  16.9  13.2    Platelets 183  195  215    Neutrophil Rel % 71.4  48.3  54.5    Immature Granulocyte Rel % 0.3  0.4  0.4    Lymphocyte Rel % 18.5  41.6  33.8    Monocyte Rel % 9.0  8.1  9.6    Eosinophil Rel % 0.6  1.4  1.5    Basophil Rel % 0.2  0.2  0.2       Lipid Panel          9/28/2023    10:55 6/3/2024    10:26   Lipid Panel   Total Cholesterol 178  181    Triglycerides 136  113    HDL Cholesterol 36  39    VLDL Cholesterol 25  21    LDL Cholesterol  117  121    LDL/HDL Ratio 3.19  3.06       Lab Results   Component Value Date    TSH 1.860 04/14/2023    TSH 2.030 05/25/2022    TSH 1.650 10/11/2021      Lab Results   Component Value Date    FREET4 1.08 05/25/2022    FREET4 1.4 05/10/2019      A1C Last 3 Results          9/28/2023    10:55 6/3/2024    10:26   HGBA1C Last 3 Results   Hemoglobin A1C 6.10  5.90                        Visit Diagnoses:    ICD-10-CM ICD-9-CM   1. Sciatic leg pain  M54.30 724.3   2. Degeneration of lumbar intervertebral disc  M51.36 722.52   3. Spinal stenosis of lumbar region with neurogenic claudication  M48.062 724.03   4. Chronic pain syndrome  G89.4 338.4   5. Rheumatoid arthritis, involving unspecified site, unspecified whether rheumatoid factor present  M06.9 714.0   6. Chronic bilateral thoracic back pain  M54.6 724.1    G89.29 338.29       Assessment and Plan   Diagnoses and all orders for this visit:    1. Sciatic leg pain (Primary)  -     CT cervical spine w contrast; Future  -     CT Thoracic Spine With Contrast; Future  -     CT Lumbar Spine With Contrast; Future    2. Degeneration of lumbar intervertebral disc  -     CT cervical spine w contrast; Future  -     CT Thoracic Spine With Contrast; Future  -     CT Lumbar Spine With Contrast; Future    3. Spinal stenosis of lumbar region with neurogenic claudication  -     CT  cervical spine w contrast; Future  -     CT Thoracic Spine With Contrast; Future  -     CT Lumbar Spine With Contrast; Future    4. Chronic pain syndrome  -     CT cervical spine w contrast; Future  -     CT Thoracic Spine With Contrast; Future  -     CT Lumbar Spine With Contrast; Future    5. Rheumatoid arthritis, involving unspecified site, unspecified whether rheumatoid factor present  -     CT cervical spine w contrast; Future  -     CT Thoracic Spine With Contrast; Future  -     CT Lumbar Spine With Contrast; Future    6. Chronic bilateral thoracic back pain  -     CT cervical spine w contrast; Future  -     CT Thoracic Spine With Contrast; Future  -     CT Lumbar Spine With Contrast; Future        Leg pain, arterial evaluation----July 9, 2024 shows the right EDEL is normal and the left EDEL is normal with normal digital pressures normal study    Vision changes double vision, will refer to dr Andrade neurology for second opinion, MRI of the brain March 19, 2024 shows no acute intracranial process small vessel chronic ischemic changes otherwise    Restless legs,--continues  requip 3 mg tid and 10 mg qhs and prn hydocodone , gabapentin 800 mg twice daily ---Status post IV iron June 6 through June 13, 2023 with Venofer      Anemia iron deficiency----- finished IV iron Venofer June 2023, current iron level normal, hemoglobin much improved at 14 September 28, 2023     Peripheral neuropathy--- continues gabapentin at night along with the change in the Requip and iron infusions as of June 29, 2023     Chronic back pain severe at times, makes her nauseated no appetite, has tried different medications pain medications injections without relief, has been to pain management in the past discussed treatment options going forward September 29, 2023 patient's been to [facility] spine [facility] had injections done also, 2021 December we will get a send in HALKAR for her due to chronic pain and worsening pain and upcoming trip  September 29, 2023 I sent in Norco 5/3/2025 every 4 hours as needed #25 no refills-----patient self-referred her to [facility] spine surgeon, and is going through physical therapy now considering surgery to repair or fix previous rods screws in her back, discussed June 6, 2024---------- [facility] has requested a CT scan of her C-spine// thoracic spine// lumbar spine through our local facility prior to her going to their facility for possible surgical consultation,     Chest pains,  cardiac stress test August 23, 2022, ---showed normal left ventricle size ejection fraction 70% no wall motion abnormalities low risk myocardial perfusion study, with Lexiscan, echocardiogram September 9, 2022 showed normal ejection fraction 68% posterior mitral valve leaflet thickening chordal Gavino was noted, diastolic dysfunction with mild septal asymmetric hypertrophy, chest x-ray no active disease August 27, 2022     Type 2 diabetes hemoglobin A1c -,= 5.9 Viviana 3, 2024, patient stopped metformin, diarrhea improved, urine for microalbumin was normal September 28, 2023--- no treatment currently,     Vitamin B12 deficiency recommend B12 replacement daily, June 6, 2024     left foot pain -----------arterial evaluation March 15, 2022 shows EDEL left and right to be normal,---    Tobacco abuse, discussed, recommend quitting, discussed juventino 6/20/2024     Previous left total knee arthroplasty, laparoscopic cholecystectomy March 2016, previous colonoscopies polypectomies, right breast biopsy, right hip surgery, lap band surgery, ADRIANO, recent back surgery July 2018,     Mixed hyperlipidemia discussed improvement in LDL down to 70 if possible September 2023,--- patient stopped Crestor      Chronic pain, continue Cymbalta, 60 mg daily     Depression anxiety, continues Cymbalta 60 mg daily     Peripheral neuropathy lower legs,---CONT GABAPENTIN 800 Mg  nightly     Previous pelvic fractures     Diverticulosis and diverticulitis in September 2015      small bowel enteritis small bowel perforation--- resolved 2016     Kidney stones     Alcohol use in the past,--has not drank in greater than 4 month as of March 2023--- says she does not drink anymore as of June 2024,     ERCP Common bile duct stone cholelithiasis choledocholithiasis with stent placement and sphincterotomy February 29, 2016, status post laparoscopic cholecystectomy Dr. Huizar     Paroxysmal atrial fibrillation May 2019 with RVR, clinically in sinus rhythm     Hypertension, continues metoprolol XL 25 mg daily,     Obstructive sleep apnea, continue CPAP     Chronic lower extremity edema, patient has stopped taking Lasix 40 mg daily as of September 2023 ---- edema has improved in her legs,  BR NP levels are extremely low at 89, March 2023 ------ recommend compression devices rather than more diuretics given the kidney numbers and heart numbers being good, November 2022                   Follow Up   No follow-ups on file.  Patient was given instructions and counseling regarding her condition or for health maintenance advice. Please see specific information pulled into the AVS if appropriate.

## 2024-07-15 ENCOUNTER — TREATMENT (OUTPATIENT)
Dept: PHYSICAL THERAPY | Facility: CLINIC | Age: 79
End: 2024-07-15
Payer: MEDICARE

## 2024-07-15 DIAGNOSIS — G89.29 CHRONIC BILATERAL LOW BACK PAIN WITHOUT SCIATICA: Primary | ICD-10-CM

## 2024-07-15 DIAGNOSIS — M54.50 CHRONIC BILATERAL LOW BACK PAIN WITHOUT SCIATICA: Primary | ICD-10-CM

## 2024-07-15 NOTE — PROGRESS NOTES
Physical Therapy Daily Treatment Note                          Patient: Ivania Mattson   : 1945  Diagnosis/ICD-10 Code:  Chronic bilateral low back pain without sciatica [M54.50, G89.29]  Referring practitioner: Dillon Gardner, *  Date of Initial Visit: Type: THERAPY  Noted: 2024  Today's Date: 7/15/2024  Patient seen for 13 sessions           Subjective   The patient reported no new complaints    Objective   See Exercise, Manual, and Modality Logs for complete treatment.     Assessment/Plan    Continued regular strength training with no adverse affects. Patient progressing very well overall. Cues required for upright posture.      Timed:  Manual Therapy:    0     mins  22912;  Therapeutic Exercise:    10     mins  80323;     Neuromuscular Sven:   0    mins  93965;    Therapeutic Activity:     15     mins  97481;     Gait Trainin     mins  59819;     Aquatics                         0      mins  38090    Un-timed:  Mechanical Traction      0     mins  59069  Dry Needling     0     mins self-pay  Electrical Stimulation:    0     mins  90775 ( );      Timed Treatment:   25   mins   Total Treatment:     25   mins    Daryl Cruz PT  Physical Therapist    Electronically signed 7/15/2024    [place] License: PT - [id number]

## 2024-07-17 ENCOUNTER — TREATMENT (OUTPATIENT)
Dept: PHYSICAL THERAPY | Facility: CLINIC | Age: 79
End: 2024-07-17
Payer: MEDICARE

## 2024-07-17 DIAGNOSIS — G89.29 CHRONIC BILATERAL LOW BACK PAIN WITHOUT SCIATICA: Primary | ICD-10-CM

## 2024-07-17 DIAGNOSIS — M54.50 CHRONIC BILATERAL LOW BACK PAIN WITHOUT SCIATICA: Primary | ICD-10-CM

## 2024-07-17 NOTE — PROGRESS NOTES
"   Physical Therapy Daily Treatment Note                          Patient: Ivania Mattson   : 1945  Diagnosis/ICD-10 Code:  Chronic bilateral low back pain without sciatica [M54.50, G89.29]  Referring practitioner: Dillon Gardner, *  Date of Initial Visit: Type: THERAPY  Noted: 2024  Today's Date: 2024  Patient seen for 14 sessions           Subjective   The patient reported feeling hot from doing her exercises outside but otherwise is feeling \"ok\".     Objective   See Exercise, Manual, and Modality Logs for complete treatment.     Assessment/Plan     Continued strengthening and postural training to tolerance. Gait training tolerated well although endurance remains limited and CGA is required, as well as cues for upright posture. Further skilled care is required to address postural weakness limiting function for standing activities.     Timed:  Manual Therapy:    0     mins  55508;  Therapeutic Exercise:    0     mins  34638;     Neuromuscular Sven:   0    mins  46162;    Therapeutic Activity:     22     mins  79845;     Gait Trainin     mins  61262;     Aquatics                         0      mins  81957    Un-timed:  Mechanical Traction      0     mins  18012  Dry Needling     0     mins self-pay  Electrical Stimulation:    0     mins  54960 ( );      Timed Treatment:   31   mins   Total Treatment:     31   mins    Daryl Cruz PT  Physical Therapist    Electronically signed 2024    [place] License: PT - [id number]                      "

## 2024-07-22 ENCOUNTER — TREATMENT (OUTPATIENT)
Dept: PHYSICAL THERAPY | Facility: CLINIC | Age: 79
End: 2024-07-22
Payer: MEDICARE

## 2024-07-22 DIAGNOSIS — M54.50 CHRONIC BILATERAL LOW BACK PAIN WITHOUT SCIATICA: Primary | ICD-10-CM

## 2024-07-22 DIAGNOSIS — G89.29 CHRONIC BILATERAL LOW BACK PAIN WITHOUT SCIATICA: Primary | ICD-10-CM

## 2024-07-22 PROCEDURE — 97116 GAIT TRAINING THERAPY: CPT

## 2024-07-22 PROCEDURE — 97530 THERAPEUTIC ACTIVITIES: CPT

## 2024-07-22 NOTE — PROGRESS NOTES
Physical Therapy Daily Treatment Note                          Patient: Ivania Mattson   : 1945  Diagnosis/ICD-10 Code:  Chronic bilateral low back pain without sciatica [M54.50, G89.29]  Referring practitioner: Dillon Gardner, *  Date of Initial Visit: Type: THERAPY  Noted: 2024  Today's Date: 2024  Patient seen for 15 sessions           Subjective   The patient reported she is feeling well today and reports no complaints.     Objective   See Exercise, Manual, and Modality Logs for complete treatment.     Assessment/Plan     Continued strengthening program to tolerance. Gait training continues be be tolerate well and she is improving her total distance, but remains very limited by endurance and postural fatigue. Plan to continue POC at this time.     Timed:  Manual Therapy:    0     mins  83335;  Therapeutic Exercise:    0     mins  57524;     Neuromuscular Sven:   0    mins  55630;    Therapeutic Activity:     15     mins  17904;     Gait Training:      15     mins  27684;     Aquatics                         0      mins  01318    Un-timed:  Mechanical Traction      0     mins  43480  Dry Needling     0     mins self-pay  Electrical Stimulation:    0     mins  34534 ( );      Timed Treatment:   30   mins   Total Treatment:     30   mins    Daryl Cruz PT  Physical Therapist    Electronically signed 2024    [place] License: PT - [id number]

## 2024-07-24 ENCOUNTER — HOSPITAL ENCOUNTER (OUTPATIENT)
Dept: CT IMAGING | Facility: HOSPITAL | Age: 79
Discharge: HOME | End: 2024-07-24
Admitting: INTERNAL MEDICINE
Payer: MEDICARE

## 2024-07-24 DIAGNOSIS — M54.6 CHRONIC BILATERAL THORACIC BACK PAIN: ICD-10-CM

## 2024-07-24 DIAGNOSIS — M06.9 RHEUMATOID ARTHRITIS, INVOLVING UNSPECIFIED SITE, UNSPECIFIED WHETHER RHEUMATOID FACTOR PRESENT: ICD-10-CM

## 2024-07-24 DIAGNOSIS — M48.062 SPINAL STENOSIS OF LUMBAR REGION WITH NEUROGENIC CLAUDICATION: ICD-10-CM

## 2024-07-24 DIAGNOSIS — G89.29 CHRONIC BILATERAL THORACIC BACK PAIN: ICD-10-CM

## 2024-07-24 DIAGNOSIS — G89.4 CHRONIC PAIN SYNDROME: ICD-10-CM

## 2024-07-24 DIAGNOSIS — M51.36 DEGENERATION OF LUMBAR INTERVERTEBRAL DISC: ICD-10-CM

## 2024-07-24 DIAGNOSIS — M54.30 SCIATIC LEG PAIN: ICD-10-CM

## 2024-07-24 LAB
CREAT BLDA-MCNC: 0.9 MG/DL (ref 0.6–1.3)
EGFRCR SERPLBLD CKD-EPI 2021: 64.8 ML/MIN/1.73

## 2024-07-24 PROCEDURE — 25510000001 IOPAMIDOL PER 1 ML: Performed by: INTERNAL MEDICINE

## 2024-07-24 PROCEDURE — 82565 ASSAY OF CREATININE: CPT

## 2024-07-24 PROCEDURE — 72126 CT NECK SPINE W/DYE: CPT

## 2024-07-24 PROCEDURE — 72129 CT CHEST SPINE W/DYE: CPT

## 2024-07-24 PROCEDURE — 72132 CT LUMBAR SPINE W/DYE: CPT

## 2024-07-24 RX ADMIN — IOPAMIDOL 100 ML: 755 INJECTION, SOLUTION INTRAVENOUS at 09:11

## 2024-07-29 ENCOUNTER — TREATMENT (OUTPATIENT)
Dept: PHYSICAL THERAPY | Facility: CLINIC | Age: 79
End: 2024-07-29
Payer: MEDICARE

## 2024-07-29 DIAGNOSIS — G89.29 CHRONIC BILATERAL LOW BACK PAIN WITHOUT SCIATICA: Primary | ICD-10-CM

## 2024-07-29 DIAGNOSIS — M54.50 CHRONIC BILATERAL LOW BACK PAIN WITHOUT SCIATICA: Primary | ICD-10-CM

## 2024-07-29 PROCEDURE — 97530 THERAPEUTIC ACTIVITIES: CPT

## 2024-07-29 PROCEDURE — 97112 NEUROMUSCULAR REEDUCATION: CPT

## 2024-07-29 NOTE — PROGRESS NOTES
Progress Note      Patient: Ivania Mattson   : 1945  Diagnosis/ICD-10 Code:  Chronic bilateral low back pain without sciatica [M54.50, G89.29]  Referring practitioner: Dillon Gardner, *  Date of Initial Visit: Type: THERAPY  Noted: 2024  Today's Date: 2024  Patient seen for 16 sessions      Subjective:   Subjective Questionnaire: Oswestry: 20  Clinical Progress: improved  Home Program Compliance: Yes  Treatment has included: therapeutic exercise, neuromuscular re-education, therapeutic activity, and gait training    Subjective   Patient states she is working on her posture at home and feels it is improving and she feels stronger.   Objective          Active Range of Motion     Lumbar   Flexion: 100 (%) degrees   Extension: 75 (%) degrees   Left lateral flexion: 100 (%) degrees   Right lateral flexion: 100 (%) degrees     Strength/Myotome Testing     Left Hip   Planes of Motion   Flexion: 5  Extension: 5  Abduction: 5  Adduction: 5    Right Hip   Planes of Motion   Flexion: 5  Extension: 5  Abduction: 5  Adduction: 5    Left Knee   Flexion: 5  Extension: 5    Right Knee   Flexion: 5  Extension: 5    Left Ankle/Foot   Dorsiflexion: 5    Right Ankle/Foot   Dorsiflexion: 5    Functional Assessment     Single Leg Stance   Left: 3 seconds  Right: 3 seconds    Comments  5x STS: 13.00s      See Exercise, Manual, and Modality Logs for complete treatment.     Assessment/Plan  Patient has progressed well since beginning care and has met 3 of her short term goals and 2 of her long term goals. Notable increases in lumbar ROM and hip strength achieved allowing for increase standing tolerance for cooking and ambulation. Balance deficits remain creating a risk of fall for patient. ROM, strength, and postural deficits deficits remain but are progressing towards LTGs. Further skilled care required at this time to decrease falls risk and improve function for home care tasks, mobility tasks, and ADLs.      Progress toward previous goals: Partially Met   1. The patient complains of low back pain.  LTG 1: 12 weeks:  The patient will report a pain rating of 2 or better in order to improve  tolerance to activities of daily living and improve sleep quality.  STATUS: progressing  STG 1a: 6 weeks:  The patient will report a pain rating of 4 or better.  STATUS: met    2. The patient demonstrates weakness of the hip.  LTG 2: 12 weeks:  The patient will demonstrate 4+ /5 strength for hip flexion, abduction,  and extension in order to improve hip stability.  STATUS:  met  STG 2a: 6 weeks:  The patient will demonstrate 4 /5 strength for hip flexion, abduction,  and extension.  STATUS:  met    3. The patient has limited lumbar AROM  LTG 4: 12 weeks:  The patient will demonstrate lumbar AROM as follows: 75% of flexion and 75% of extension.  STATUS:  met  STG 4a: 6 weeks: The patient will demonstrate lumbar AROM as follows: 65% of flexion and 65% of extension.  STATUS:  met    4. Mobility: Walking/Moving Around Functional Limitation    LTG 4: 12 weeks:  The patient will demonstrate improved function by achieving a score of 5 on the NIYA.  STATUS:  progressing  STG 4 a: 6 weeks:  The patient will demonstrate improved function by achieving a score of 10 on the NIYA.    STATUS:  progressing    Recommendations: Continue as planned  Timeframe: 4 weeks  Prognosis to achieve goals: good    PT Signature: Daryl Cruz PT    Electronically signed 2024    [place] License: PT - [id number]       Timed:  Manual Therapy:    0     mins  13437;  Therapeutic Exercise:    0     mins  44412;     Neuromuscular Sven:    17    mins  83432;    Therapeutic Activity:     14     mins  65000;     Gait Trainin     mins  03484;     Aquatics                         0      mins  89424    Un-timed:  Mechanical Traction      0     mins  22111  Dry Needling     0     mins self-pay  Electrical Stimulation:    0     mins  51520 ( );    Timed Treatment:    31   mins   Total Treatment:     31   mins

## 2024-07-31 ENCOUNTER — TREATMENT (OUTPATIENT)
Dept: PHYSICAL THERAPY | Facility: CLINIC | Age: 79
End: 2024-07-31
Payer: MEDICARE

## 2024-07-31 DIAGNOSIS — M54.50 CHRONIC BILATERAL LOW BACK PAIN WITHOUT SCIATICA: Primary | ICD-10-CM

## 2024-07-31 DIAGNOSIS — G89.29 CHRONIC BILATERAL LOW BACK PAIN WITHOUT SCIATICA: Primary | ICD-10-CM

## 2024-08-05 ENCOUNTER — TREATMENT (OUTPATIENT)
Dept: PHYSICAL THERAPY | Facility: CLINIC | Age: 79
End: 2024-08-05
Payer: MEDICARE

## 2024-08-05 DIAGNOSIS — G89.29 CHRONIC BILATERAL LOW BACK PAIN WITHOUT SCIATICA: Primary | ICD-10-CM

## 2024-08-05 DIAGNOSIS — M54.50 CHRONIC BILATERAL LOW BACK PAIN WITHOUT SCIATICA: Primary | ICD-10-CM

## 2024-08-05 PROCEDURE — 97530 THERAPEUTIC ACTIVITIES: CPT | Performed by: PHYSICAL THERAPIST

## 2024-08-05 PROCEDURE — 97110 THERAPEUTIC EXERCISES: CPT | Performed by: PHYSICAL THERAPIST

## 2024-08-05 NOTE — PROGRESS NOTES
Outpatient Physical Therapy  [address] [facility], [place] [address]                            Physical Therapy Daily Treatment Note    Patient: Ivania Mattson   : 1945  Diagnosis/ICD-10 Code:  Chronic bilateral low back pain without sciatica [M54.50, G89.29]  Referring practitioner: No ref. provider found  Date of Initial Visit: Type: THERAPY  Noted: 2024  Today's Date: 2024  Patient seen for 18 sessions           Subjective   Ivania Mattson reports: she always has some type of pain in her back. Pt states that therapy has  been helping, but it doesn't take the pain away completely.       Objective     See Exercise, Manual, and Modality Logs for complete treatment.     Assessment/Plan  Ivania presents to therapy this session with mild pain in her lower back. Pt was able to complete exercises without an increase in pain or discomfort this PT session. Pt began to fatigue during side steps with HHA. Pt will continue to  benefit from skilled PT services to address strength deficits, pain management and any concerns with ADLs.       Progress per Plan of Care         Timed:  Manual Therapy:         mins  09281;  Therapeutic Exercise:    16     mins  23523;     Neuromuscular Sven:        mins  30695;    Therapeutic Activity:     14     mins  02325;     Gait Training:           mins  70797;        Untimed:  Electrical Stimulation:         mins  01444 ( );  Mechanical Traction:         mins  18764;       Timed Treatment:   30   mins   Total Treatment:     30   mins      Electronically signed:     Nisha Elizalde PTA  Physical Therapist Assistant  [facility] License #: [id number]

## 2024-08-06 ENCOUNTER — OFFICE VISIT (OUTPATIENT)
Dept: NEUROLOGY | Facility: CLINIC | Age: 79
End: 2024-08-06
Payer: MEDICARE

## 2024-08-06 ENCOUNTER — TELEPHONE (OUTPATIENT)
Dept: INTERNAL MEDICINE | Age: 79
End: 2024-08-06
Payer: MEDICARE

## 2024-08-06 VITALS
WEIGHT: 160 LBS | HEART RATE: 96 BPM | SYSTOLIC BLOOD PRESSURE: 124 MMHG | HEIGHT: 59 IN | BODY MASS INDEX: 32.25 KG/M2 | DIASTOLIC BLOOD PRESSURE: 85 MMHG

## 2024-08-06 DIAGNOSIS — H53.2: Primary | ICD-10-CM

## 2024-08-06 PROBLEM — H04.123 DRY EYE SYNDROME OF BOTH EYES: Status: RESOLVED | Noted: 2024-06-20 | Resolved: 2024-08-06

## 2024-08-06 PROCEDURE — 1159F MED LIST DOCD IN RCRD: CPT | Performed by: PSYCHIATRY & NEUROLOGY

## 2024-08-06 PROCEDURE — 3074F SYST BP LT 130 MM HG: CPT | Performed by: PSYCHIATRY & NEUROLOGY

## 2024-08-06 PROCEDURE — 1160F RVW MEDS BY RX/DR IN RCRD: CPT | Performed by: PSYCHIATRY & NEUROLOGY

## 2024-08-06 PROCEDURE — 99214 OFFICE O/P EST MOD 30 MIN: CPT | Performed by: PSYCHIATRY & NEUROLOGY

## 2024-08-06 PROCEDURE — 3079F DIAST BP 80-89 MM HG: CPT | Performed by: PSYCHIATRY & NEUROLOGY

## 2024-08-06 NOTE — ASSESSMENT & PLAN NOTE
I discussed with her that I cannot attribute her diplopia to any neurologic disease and it really does not affect her activities of daily living.  I gave her patches to put in her eyeglasses hearing at times when she is complaining of diplopia so that she can wear a patch and she can continue what she is doing.  Otherwise I discussed with her that exhaustive testing such as a CT angiogram of the head can be obtained looking for aneurysms which does not follow the pattern of having intermittent diplopia for 6 years nor can I relate her symptoms to ocular myasthenia gravis since there is no clinical setting in which the diplopia is worse and there is no associated ptosis.  She is not willing to undergo further testing at this time.  I will see her in the future as needed.  Thank you for letting me participate in her care.

## 2024-08-06 NOTE — TELEPHONE ENCOUNTER
I called pt / daughter with ct c spine. Lumbar , thoracic---, spoke with daughter regarding findings and recommendations for neurosurgery to follow-up on specifics about treatment options specific findings at each level and need to see a neurosurgeon to decide about further treatment options in the future, they need to take the disc down to [place] when they go see the neurosurgeons and she understands

## 2024-08-06 NOTE — PROGRESS NOTES
"Chief Complaint  Follow-up (Re-eval)    Subjective          Ivania Mattson is a 78 y.o. female who presents to [facility]  History of Present Illness  78-year-old woman here for follow-up for diplopia.  She states that she took Mestinon which made no difference.  She is complaining of intermittent diplopia and she tells her daughter that sometimes that that probably is near vision sometimes far vision.  It comes and goes.  When she closes her eyes it goes away.  This is been ongoing for the last 6 years.  Workup included MRI of the brain which shows findings suggestive of advanced chronic small vessel disease.  In the past she had acetylcholine receptor antibody testing which was negative.    Objective   Vital Signs:   /85 (BP Location: Right arm, Patient Position: Sitting, Cuff Size: Adult)   Pulse 96   Ht 149.9 cm (59.02\")   Wt 72.6 kg (160 lb)   BMI 32.30 kg/m²     Physical Exam   Third, fluent, phasic, follows commands well.  Visual fields are full.  EOMs are full directions gaze.  She is not complaining of diplopia at this time.        Assessment and Plan  Diagnoses and all orders for this visit:    1. Functional diplopia (Primary)  Assessment & Plan:  I discussed with her that I cannot attribute her diplopia to any neurologic disease and it really does not affect her activities of daily living.  I gave her patches to put in her eyeglasses hearing at times when she is complaining of diplopia so that she can wear a patch and she can continue what she is doing.  Otherwise I discussed with her that exhaustive testing such as a CT angiogram of the head can be obtained looking for aneurysms which does not follow the pattern of having intermittent diplopia for 6 years nor can I relate her symptoms to ocular myasthenia gravis since there is no clinical setting in which the diplopia is worse and there is no associated ptosis.  She is not willing to undergo further " testing at this time.  I will see her in the future as needed.  Thank you for letting me participate in her care.           Total time spent with the patient and coordinating patient care was 35 minutes.    Follow Up  No follow-ups on file.  Patient was given instructions and counseling regarding her condition or for health maintenance advice. Please see specific information pulled into the AVS if appropriate.

## 2024-08-07 ENCOUNTER — TREATMENT (OUTPATIENT)
Dept: PHYSICAL THERAPY | Facility: CLINIC | Age: 79
End: 2024-08-07
Payer: MEDICARE

## 2024-08-07 DIAGNOSIS — M54.50 CHRONIC BILATERAL LOW BACK PAIN WITHOUT SCIATICA: Primary | ICD-10-CM

## 2024-08-07 DIAGNOSIS — G89.29 CHRONIC BILATERAL LOW BACK PAIN WITHOUT SCIATICA: Primary | ICD-10-CM

## 2024-08-07 NOTE — PROGRESS NOTES
Physical Therapy Daily Treatment Note  [address], [address] [place] [address]        Patient: Ivania Mattson   : 1945  Diagnosis/ICD-10 Code:  Chronic bilateral low back pain without sciatica [M54.50, G89.29]  Referring practitioner: Dillon Gardner, *  Date of Initial Visit: Type: THERAPY  Noted: 2024  Today's Date: 2024  Patient seen for 19 sessions             Subjective     Ivania Mattson reports having 7/10 pain in her Mid and lower back upon arrival today.    Objective       See Exercise  Logs for complete treatment.       Assessment/Plan    Pt tolerated therapy session well - with progression of therapeutic exercises, CKC-Functional activities, and Manual therapy. She has improved, but continues to have deficits in Her Trunk-Core and Bilateral Hip ROM,  Strength, and Stability; limiting functional mobility and ability to perform ADLs without increased pain or difficulty at this time.  Pt continues to require verbal and tactile cues to improve posture and form during exercise performance and with gait.  Pt doing well with functional Trunk postural stabilization progression.  Pt denied having any increase in pain during or post session today.     Progress per Plan of Care - as tolerated.               Timed:  Manual Therapy:    0     mins  09403;  Therapeutic Exercise:    20     mins  36896;     Neuromuscular Sven:    8    mins  07468;    Therapeutic Activity:     15     mins  05591;     Gait Trainin     mins  92563;     Ultrasound:     0     mins  71110;    Electrical Stimulation:    0     mins  87196;  Iontophoresis     0     mins  00098    Untimed:  Electrical Stimulation:    00     mins  76230 ( );  Mechanical Traction:    0     mins  18092;   Fluidotherapy     0     mins  78019  Hot pack     0     mins  03853  Cold pack     0     mins  26768    Timed Treatment:   43   mins   Total Treatment:     43   mins        Lauren Oneal PTA  Physical Therapist Assistant

## 2024-08-08 ENCOUNTER — TELEPHONE (OUTPATIENT)
Dept: INTERNAL MEDICINE | Age: 79
End: 2024-08-08
Payer: MEDICARE

## 2024-08-08 DIAGNOSIS — M51.36 LUMBAR ADJACENT SEGMENT DISEASE WITH SPONDYLOLISTHESIS: ICD-10-CM

## 2024-08-08 DIAGNOSIS — M54.6 CHRONIC BILATERAL THORACIC BACK PAIN: ICD-10-CM

## 2024-08-08 DIAGNOSIS — G89.29 CHRONIC BILATERAL THORACIC BACK PAIN: ICD-10-CM

## 2024-08-08 DIAGNOSIS — M54.50 CHRONIC BILATERAL LOW BACK PAIN WITHOUT SCIATICA: Primary | ICD-10-CM

## 2024-08-08 DIAGNOSIS — G89.29 CHRONIC BILATERAL LOW BACK PAIN WITHOUT SCIATICA: Primary | ICD-10-CM

## 2024-08-08 DIAGNOSIS — M51.36 DEGENERATION OF LUMBAR INTERVERTEBRAL DISC: ICD-10-CM

## 2024-08-08 DIAGNOSIS — M54.30 SCIATIC LEG PAIN: ICD-10-CM

## 2024-08-08 DIAGNOSIS — G89.4 CHRONIC PAIN SYNDROME: ICD-10-CM

## 2024-08-08 DIAGNOSIS — M06.9 RHEUMATOID ARTHRITIS, INVOLVING UNSPECIFIED SITE, UNSPECIFIED WHETHER RHEUMATOID FACTOR PRESENT: ICD-10-CM

## 2024-08-08 DIAGNOSIS — M47.816 LUMBAR SPONDYLOSIS: ICD-10-CM

## 2024-08-08 DIAGNOSIS — G47.33 OBSTRUCTIVE APNEA: ICD-10-CM

## 2024-08-08 DIAGNOSIS — M25.50 MULTIPLE JOINT PAIN: ICD-10-CM

## 2024-08-08 DIAGNOSIS — M43.16 LUMBAR ADJACENT SEGMENT DISEASE WITH SPONDYLOLISTHESIS: ICD-10-CM

## 2024-08-08 NOTE — TELEPHONE ENCOUNTER
Caller: Ivania Mattson    Relationship: Self    Best call back number: [phone]     What is the medical concern/diagnosis: BACK PAIN    What specialty or service is being requested: PAIN MANAGEMENT    What is the provider, practice or medical service name: DON'T WANT TO GO BACK TO [place]. WOULD LIKE TO GO TO SOMEONE IN [facility]    What is the office location:     What is the office phone number:     Any additional details: PLEASE CALL AND ADVISE WHEN REFERRAL HAS BEEN SENT

## 2024-08-09 NOTE — TELEPHONE ENCOUNTER
Pt called and wanted to know if she is a candidate for a pain pump and if this is something PCP thinks she would benefit from.    If so, the referral will need to be sent to a pain mgmt clinic that offers this procedure.    Please advise.

## 2024-08-12 ENCOUNTER — TREATMENT (OUTPATIENT)
Dept: PHYSICAL THERAPY | Facility: CLINIC | Age: 79
End: 2024-08-12
Payer: MEDICARE

## 2024-08-12 DIAGNOSIS — G89.29 CHRONIC BILATERAL LOW BACK PAIN WITHOUT SCIATICA: Primary | ICD-10-CM

## 2024-08-12 DIAGNOSIS — M54.50 CHRONIC BILATERAL LOW BACK PAIN WITHOUT SCIATICA: Primary | ICD-10-CM

## 2024-08-12 RX ORDER — DULOXETIN HYDROCHLORIDE 60 MG/1
CAPSULE, DELAYED RELEASE ORAL
Qty: 90 CAPSULE | Refills: 3 | Status: SHIPPED | OUTPATIENT
Start: 2024-08-12

## 2024-08-12 NOTE — PROGRESS NOTES
"Physical Therapy Daily Treatment Note  [address], [address] [place] [address]        Patient: Ivania Mattson   : 1945  Diagnosis/ICD-10 Code:  Chronic bilateral low back pain without sciatica [M54.50, G89.29]  Referring practitioner: Dillon Gardner, *  Date of Initial Visit: Type: THERAPY  Noted: 2024  Today's Date: 2024  Patient seen for 20 sessions             Subjective   Ivania Mattson reports that she continues to have pain primarily only when standing and walking.  She also reports that her spouse had fallen on Saturday and states that \"He landed on my leg\" and \"knocked it off of recliner.  She raised her pant leg on her left lower leg to show- Moderate size bruise on left shin.  Pt denied having any  pain upon arrival today.    Objective       See Exercise and Manual Logs for complete treatment.       Assessment/Plan    Pt tolerated therapy session well - with progression of therapeutic exercises, CKC-Functional activities, and Manual therapy. She has improved, but continues to have deficits in Her Trunk-Core and Bilateral Hip ROM,  Strength, and Stability; limiting functional mobility and ability to perform ADLs without increased pain or difficulty at this time.  Pt continues to require verbal and tactile cues to improve posture and form during exercise performance and with gait.  Pt doing well with functional gait and Trunk postural stabilization progression.  Pt denied having any increase in pain during or post session today.     Progress per Plan of Care - as tolerate               Timed:  Manual Therapy:    0     mins  46725;  Therapeutic Exercise:    22     mins  44255;     Neuromuscular Sven:    8    mins  46667;    Therapeutic Activity:     15     mins  87262;     Gait Trainin     mins  45820;     Ultrasound:     0     mins  14267;    Electrical Stimulation:    0     mins  35098;  Iontophoresis     0     mins  58767    Untimed:  Electrical Stimulation:    0     " mins  91551 ( );  Mechanical Traction:    0     mins  35578;   Fluidotherapy     0     mins  98666  Hot pack     0     mins  59659  Cold pack     0     mins  55073    Timed Treatment:   45   mins   Total Treatment:     45   mins        Lauren Oneal PTA  Physical Therapist Assistant

## 2024-08-14 ENCOUNTER — TELEPHONE (OUTPATIENT)
Dept: INTERNAL MEDICINE | Age: 79
End: 2024-08-14

## 2024-08-14 ENCOUNTER — TREATMENT (OUTPATIENT)
Dept: PHYSICAL THERAPY | Facility: CLINIC | Age: 79
End: 2024-08-14
Payer: MEDICARE

## 2024-08-14 DIAGNOSIS — M54.50 CHRONIC BILATERAL LOW BACK PAIN WITHOUT SCIATICA: Primary | ICD-10-CM

## 2024-08-14 DIAGNOSIS — G89.29 CHRONIC BILATERAL LOW BACK PAIN WITHOUT SCIATICA: Primary | ICD-10-CM

## 2024-08-14 NOTE — TELEPHONE ENCOUNTER
Caller: Ivania Mattson    Relationship: Self    Best call back number: [phone]     What is the best time to reach you: ANY    Who are you requesting to speak with (clinical staff, provider,  specific staff member): CLINICAL STAFF    What was the call regarding: PATIENT CALLED WANTING AN UPDATE ON HER PAIN MANAGEMENT REFERRAL

## 2024-08-14 NOTE — PROGRESS NOTES
Physical Therapy Daily Treatment Note  [address], [address] [place] [address]        Patient: Ivania Mattson   : 1945  Diagnosis/ICD-10 Code:  Chronic bilateral low back pain without sciatica [M54.50, G89.29]  Referring practitioner: Dillon Gardner, *  Date of Initial Visit: Type: THERAPY  Noted: 2024  Today's Date: 2024  Patient seen for 21 sessions             Subjective   Ivania Mattson denies having any pain upon arrival today.    Objective       See Exercise and Manual Logs for complete treatment.       Assessment/Plan    Pt tolerated therapy session well - with progression of therapeutic exercises, CKC-Functional activities, and Manual therapy. She has improved, but continues to have deficits in Her Trunk-Core and Bilateral Hip ROM,  Strength, and Stability; limiting functional mobility and ability to perform ADLs without increased pain or difficulty at this time.  Pt continues to require verbal and tactile cues to improve posture and form during exercise performance and with gait.  Pt doing well with functional gait and Trunk postural stabilization progression.  Pt reported having mild  increase in back pain  post session today.     Progress per Plan of Care - as tolerate               Timed:  Manual Therapy:    0     mins  46908;  Therapeutic Exercise:    20     mins  89591;     Neuromuscular Sven:    8    mins  40125;    Therapeutic Activity:     10     mins  42889;     Gait Trainin     mins  63040;     Ultrasound:     0     mins  68381;    Electrical Stimulation:    0     mins  38245;  Iontophoresis     0     mins  54473    Untimed:  Electrical Stimulation:    0     mins  37526 ( );  Mechanical Traction:    0     mins  76709;   Fluidotherapy     0     mins  40654  Hot pack     0     mins  79694  Cold pack     0     mins  13614    Timed Treatment:   38   mins   Total Treatment:     38   mins        Lauren Oneal PTA  Physical Therapist Assistant

## 2024-08-15 NOTE — TELEPHONE ENCOUNTER
LVM for patient to inform her that [facility] asked me to refax the referral to [phone], since the office representative is not totally sure if they received the referral or not. They are taking care of multiple offices. I also gave patient the phone number to their office and told her to give them a call if she has not heard anything from them by Monday.

## 2024-08-19 ENCOUNTER — TREATMENT (OUTPATIENT)
Dept: PHYSICAL THERAPY | Facility: CLINIC | Age: 79
End: 2024-08-19
Payer: MEDICARE

## 2024-08-19 DIAGNOSIS — G89.29 CHRONIC BILATERAL LOW BACK PAIN WITHOUT SCIATICA: Primary | ICD-10-CM

## 2024-08-19 DIAGNOSIS — M54.50 CHRONIC BILATERAL LOW BACK PAIN WITHOUT SCIATICA: Primary | ICD-10-CM

## 2024-08-19 NOTE — PROGRESS NOTES
Physical Therapy Daily Treatment Note  [address], [address] [place] [address]        Patient: Ivania Mattson   : 1945  Diagnosis/ICD-10 Code:  Chronic bilateral low back pain without sciatica [M54.50, G89.29]  Referring practitioner: Dillon Gardner, *  Date of Initial Visit: Type: THERAPY  Noted: 2024  Today's Date: 2024  Patient seen for 22 sessions             Subjective     Ivania Mattson reports having 4/10 pain in her lower back upon arrival today.    Objective     Functional Tolerance:  Fair - / Fair    See Exercise Logs for complete treatment.       Assessment/Plan    Pt tolerated therapy session well - with progression of therapeutic exercises, CKC-Functional activities, and Manual therapy. She has improved, but continues to have deficits in Her Trunk-Core and Bilateral Hip ROM,  Strength, and Stability; limiting functional mobility and ability to perform ADLs without increased pain or difficulty at this time.  Pt continues to require verbal and tactile cues to improve posture and form during exercise performance and with gait.  Pt doing well with functional gait and Trunk postural stabilization progression.  Pt reported having mild  increase in back pain  post session today.     Progress per Plan of Care - as tolerate               Timed:  Manual Therapy:    0     mins  26103;  Therapeutic Exercise:    35     mins  33101;     Neuromuscular Sven:    8    mins  81832;    Therapeutic Activity:     10     mins  37307;     Gait Trainin     mins  24414;     Ultrasound:     0     mins  14633;    Electrical Stimulation:    0     mins  96924;  Iontophoresis     0     mins  09042    Untimed:  Electrical Stimulation:    0     mins  27774 ( );  Mechanical Traction:    0     mins  24669;   Fluidotherapy     0     mins  93804  Hot pack     0     mins  87685  Cold pack     0     mins  24224    Timed Treatment:   53   mins   Total Treatment:     53   mins        Lauren Oneal  PTA  Physical Therapist Assistant

## 2024-08-26 ENCOUNTER — TELEPHONE (OUTPATIENT)
Dept: PHYSICAL THERAPY | Facility: OTHER | Age: 79
End: 2024-08-26
Payer: MEDICARE

## 2024-08-26 NOTE — TELEPHONE ENCOUNTER
Caller: Ivania Mattson    Relationship: Self    What was the call regarding: PATIENT CANCELLED TODAYS APPT DUE TO BEING OUT OF TOWN

## 2024-09-06 ENCOUNTER — LAB (OUTPATIENT)
Dept: LAB | Facility: HOSPITAL | Age: 79
End: 2024-09-06
Payer: MEDICARE

## 2024-09-06 DIAGNOSIS — G89.4 CHRONIC PAIN SYNDROME: ICD-10-CM

## 2024-09-06 DIAGNOSIS — E55.9 VITAMIN D DEFICIENCY: ICD-10-CM

## 2024-09-06 DIAGNOSIS — M47.816 LUMBAR SPONDYLOSIS: ICD-10-CM

## 2024-09-06 DIAGNOSIS — E66.9 OBESITY (BMI 30-39.9): ICD-10-CM

## 2024-09-06 DIAGNOSIS — J44.9 CHRONIC OBSTRUCTIVE PULMONARY DISEASE, UNSPECIFIED COPD TYPE: ICD-10-CM

## 2024-09-06 DIAGNOSIS — G47.33 OBSTRUCTIVE APNEA: ICD-10-CM

## 2024-09-06 DIAGNOSIS — E78.2 MIXED HYPERLIPIDEMIA: ICD-10-CM

## 2024-09-06 DIAGNOSIS — F32.A DEPRESSION, UNSPECIFIED DEPRESSION TYPE: ICD-10-CM

## 2024-09-06 DIAGNOSIS — R53.83 OTHER FATIGUE: ICD-10-CM

## 2024-09-06 DIAGNOSIS — M48.062 SPINAL STENOSIS OF LUMBAR REGION WITH NEUROGENIC CLAUDICATION: ICD-10-CM

## 2024-09-06 DIAGNOSIS — I10 ESSENTIAL HYPERTENSION: ICD-10-CM

## 2024-09-06 DIAGNOSIS — E11.40 TYPE 2 DIABETES MELLITUS WITH DIABETIC NEUROPATHY, WITHOUT LONG-TERM CURRENT USE OF INSULIN: ICD-10-CM

## 2024-09-06 DIAGNOSIS — D50.9 IRON DEFICIENCY ANEMIA, UNSPECIFIED IRON DEFICIENCY ANEMIA TYPE: ICD-10-CM

## 2024-09-06 DIAGNOSIS — G25.81 RESTLESS LEG SYNDROME: ICD-10-CM

## 2024-09-06 DIAGNOSIS — M51.36 DEGENERATION OF LUMBAR INTERVERTEBRAL DISC: ICD-10-CM

## 2024-09-06 LAB
25(OH)D3 SERPL-MCNC: 115 NG/ML (ref 30–100)
ALBUMIN SERPL-MCNC: 4.3 G/DL (ref 3.5–5.2)
ALBUMIN UR-MCNC: 17.6 MG/DL
ALBUMIN/GLOB SERPL: 1.5 G/DL
ALP SERPL-CCNC: 74 U/L (ref 39–117)
ALT SERPL W P-5'-P-CCNC: 12 U/L (ref 1–33)
ANION GAP SERPL CALCULATED.3IONS-SCNC: 12 MMOL/L (ref 5–15)
AST SERPL-CCNC: 26 U/L (ref 1–32)
BASOPHILS # BLD AUTO: 0.02 10*3/MM3 (ref 0–0.2)
BASOPHILS NFR BLD AUTO: 0.2 % (ref 0–1.5)
BILIRUB SERPL-MCNC: 0.3 MG/DL (ref 0–1.2)
BUN SERPL-MCNC: 11 MG/DL (ref 8–23)
BUN/CREAT SERPL: 10.1 (ref 7–25)
CALCIUM SPEC-SCNC: 10 MG/DL (ref 8.6–10.5)
CHLORIDE SERPL-SCNC: 102 MMOL/L (ref 98–107)
CHOLEST SERPL-MCNC: 168 MG/DL (ref 0–200)
CO2 SERPL-SCNC: 28 MMOL/L (ref 22–29)
CREAT SERPL-MCNC: 1.09 MG/DL (ref 0.57–1)
DEPRECATED RDW RBC AUTO: 45.4 FL (ref 37–54)
EGFRCR SERPLBLD CKD-EPI 2021: 51.5 ML/MIN/1.73
EOSINOPHIL # BLD AUTO: 0.08 10*3/MM3 (ref 0–0.4)
EOSINOPHIL NFR BLD AUTO: 0.9 % (ref 0.3–6.2)
ERYTHROCYTE [DISTWIDTH] IN BLOOD BY AUTOMATED COUNT: 13.8 % (ref 12.3–15.4)
FOLATE SERPL-MCNC: 14.9 NG/ML (ref 4.78–24.2)
GLOBULIN UR ELPH-MCNC: 2.8 GM/DL
GLUCOSE SERPL-MCNC: 121 MG/DL (ref 65–99)
HBA1C MFR BLD: 5.8 % (ref 4.8–5.6)
HCT VFR BLD AUTO: 44.6 % (ref 34–46.6)
HDLC SERPL-MCNC: 38 MG/DL (ref 40–60)
HGB BLD-MCNC: 15.1 G/DL (ref 12–15.9)
IMM GRANULOCYTES # BLD AUTO: 0.03 10*3/MM3 (ref 0–0.05)
IMM GRANULOCYTES NFR BLD AUTO: 0.3 % (ref 0–0.5)
LDLC SERPL CALC-MCNC: 111 MG/DL (ref 0–100)
LDLC/HDLC SERPL: 2.86 {RATIO}
LYMPHOCYTES # BLD AUTO: 1.66 10*3/MM3 (ref 0.7–3.1)
LYMPHOCYTES NFR BLD AUTO: 18 % (ref 19.6–45.3)
MCH RBC QN AUTO: 30.6 PG (ref 26.6–33)
MCHC RBC AUTO-ENTMCNC: 33.9 G/DL (ref 31.5–35.7)
MCV RBC AUTO: 90.3 FL (ref 79–97)
MONOCYTES # BLD AUTO: 0.61 10*3/MM3 (ref 0.1–0.9)
MONOCYTES NFR BLD AUTO: 6.6 % (ref 5–12)
NEUTROPHILS NFR BLD AUTO: 6.82 10*3/MM3 (ref 1.7–7)
NEUTROPHILS NFR BLD AUTO: 74 % (ref 42.7–76)
NRBC BLD AUTO-RTO: 0 /100 WBC (ref 0–0.2)
PLATELET # BLD AUTO: 232 10*3/MM3 (ref 140–450)
PMV BLD AUTO: 10.3 FL (ref 6–12)
POTASSIUM SERPL-SCNC: 4.6 MMOL/L (ref 3.5–5.2)
PROT SERPL-MCNC: 7.1 G/DL (ref 6–8.5)
RBC # BLD AUTO: 4.94 10*6/MM3 (ref 3.77–5.28)
SODIUM SERPL-SCNC: 142 MMOL/L (ref 136–145)
T4 FREE SERPL-MCNC: 1.01 NG/DL (ref 0.92–1.68)
TRIGL SERPL-MCNC: 106 MG/DL (ref 0–150)
TSH SERPL DL<=0.05 MIU/L-ACNC: 2.31 UIU/ML (ref 0.27–4.2)
VIT B12 BLD-MCNC: >2000 PG/ML (ref 211–946)
VLDLC SERPL-MCNC: 19 MG/DL (ref 5–40)
WBC NRBC COR # BLD AUTO: 9.22 10*3/MM3 (ref 3.4–10.8)

## 2024-09-06 PROCEDURE — 82607 VITAMIN B-12: CPT

## 2024-09-06 PROCEDURE — 84439 ASSAY OF FREE THYROXINE: CPT

## 2024-09-06 PROCEDURE — 82306 VITAMIN D 25 HYDROXY: CPT

## 2024-09-06 PROCEDURE — 82746 ASSAY OF FOLIC ACID SERUM: CPT

## 2024-09-06 PROCEDURE — 80061 LIPID PANEL: CPT

## 2024-09-06 PROCEDURE — 84443 ASSAY THYROID STIM HORMONE: CPT

## 2024-09-06 PROCEDURE — 83036 HEMOGLOBIN GLYCOSYLATED A1C: CPT

## 2024-09-06 PROCEDURE — 85025 COMPLETE CBC W/AUTO DIFF WBC: CPT

## 2024-09-06 PROCEDURE — 82043 UR ALBUMIN QUANTITATIVE: CPT

## 2024-09-06 PROCEDURE — 36415 COLL VENOUS BLD VENIPUNCTURE: CPT

## 2024-09-06 PROCEDURE — 80053 COMPREHEN METABOLIC PANEL: CPT

## 2024-09-09 ENCOUNTER — TREATMENT (OUTPATIENT)
Dept: PHYSICAL THERAPY | Facility: CLINIC | Age: 79
End: 2024-09-09
Payer: MEDICARE

## 2024-09-09 DIAGNOSIS — G89.29 CHRONIC BILATERAL LOW BACK PAIN WITHOUT SCIATICA: Primary | ICD-10-CM

## 2024-09-09 DIAGNOSIS — M54.50 CHRONIC BILATERAL LOW BACK PAIN WITHOUT SCIATICA: Primary | ICD-10-CM

## 2024-09-09 PROCEDURE — 97530 THERAPEUTIC ACTIVITIES: CPT

## 2024-09-09 PROCEDURE — 97112 NEUROMUSCULAR REEDUCATION: CPT

## 2024-09-09 NOTE — PROGRESS NOTES
Discharge Summary      Patient: Ivania Mattson   : 1945  Diagnosis/ICD-10 Code:  Chronic bilateral low back pain without sciatica [M54.50, G89.29]  Referring practitioner: Dillon Gardner, *  Date of Initial Visit: Type: THERAPY  Noted: 2024  Today's Date: 2024  Patient seen for 23 sessions      Subjective:   Subjective Questionnaire: Oswestry: 11  Clinical Progress: improved  Home Program Compliance: Yes  Treatment has included: therapeutic exercise, neuromuscular re-education, therapeutic activity, and gait training    Subjective   Patient states she had 2 falls over the weekend trying to pick things up and falling backwards. She feels her back is doing better despite being on vacation and not getting to her exercises.   Objective          Active Range of Motion     Lumbar   Flexion: 100 (%) degrees   Extension: 100 (%) degrees   Left lateral flexion: 100 (%) degrees   Right lateral flexion: 100 (%) degrees     Strength/Myotome Testing     Left Hip   Planes of Motion   Flexion: 5  Extension: 5  Abduction: 5  Adduction: 5    Right Hip   Planes of Motion   Flexion: 5  Extension: 5  Abduction: 5  Adduction: 5    Left Knee   Flexion: 5  Extension: 5    Right Knee   Flexion: 5  Extension: 5    Left Ankle/Foot   Dorsiflexion: 5    Right Ankle/Foot   Dorsiflexion: 5    Functional Assessment     Single Leg Stance   Left: 9 seconds  Right: 9 seconds    Comments  5x STS: 15.00s      See Exercise, Manual, and Modality Logs for complete treatment.     Assessment/Plan  Patient has progressed well since beginning care and partially met her therapy goals. Notable increases in lumbar ROM and hip strength achieved allowing for increase standing tolerance for cooking and ambulation. Patient has met anticipated progress  and is appropriate to discharge to home program at this time.       Progress toward previous goals: Partially Met   1. The patient complains of low back pain.  LTG 1: 12 weeks:  The patient  will report a pain rating of 2 or better in order to improve  tolerance to activities of daily living and improve sleep quality.  STATUS: not met  STG 1a: 6 weeks:  The patient will report a pain rating of 4 or better.  STATUS: met    2. The patient demonstrates weakness of the hip.  LTG 2: 12 weeks:  The patient will demonstrate 4+ /5 strength for hip flexion, abduction,  and extension in order to improve hip stability.  STATUS:  met  STG 2a: 6 weeks:  The patient will demonstrate 4 /5 strength for hip flexion, abduction,  and extension.  STATUS:  met    3. The patient has limited lumbar AROM  LTG 4: 12 weeks:  The patient will demonstrate lumbar AROM as follows: 75% of flexion and 75% of extension.  STATUS:  met  STG 4a: 6 weeks: The patient will demonstrate lumbar AROM as follows: 65% of flexion and 65% of extension.  STATUS:  met    4. Mobility: Walking/Moving Around Functional Limitation    LTG 4: 12 weeks:  The patient will demonstrate improved function by achieving a score of 5 on the NIYA.  STATUS: not met  STG 4 a: 6 weeks:  The patient will demonstrate improved function by achieving a score of 10 on the NIYA.    STATUS: not met   Recommendations: discharge    Prognosis to achieve goals: good    PT Signature: Daryl Cruz, PT    Electronically signed 2024    [place] License: PT - [id number]       Timed:  Manual Therapy:    0     mins  11726;  Therapeutic Exercise:    0     mins  88655;     Neuromuscular Sven:    17    mins  54707;    Therapeutic Activity:     14     mins  80838;     Gait Trainin     mins  40292;     Aquatics                         0      mins  61233    Un-timed:  Mechanical Traction      0     mins  10512  Dry Needling     0     mins self-pay  Electrical Stimulation:    0     mins  40591 ( );    Timed Treatment:   31   mins   Total Treatment:     31   mins

## 2024-09-20 ENCOUNTER — DOCUMENTATION (OUTPATIENT)
Dept: PHYSICAL THERAPY | Facility: CLINIC | Age: 79
End: 2024-09-20
Payer: MEDICARE

## 2024-09-23 ENCOUNTER — TELEPHONE (OUTPATIENT)
Dept: INTERNAL MEDICINE | Age: 79
End: 2024-09-23

## 2024-09-23 NOTE — TELEPHONE ENCOUNTER
Caller: Ivania Mattson    Relationship: Self    Best call back number: [phone]    What is the best time to reach you: ANYTIME    Who are you requesting to speak with (clinical staff, provider,  specific staff member): NURSE       What was the call regarding: PATIENT IS TRYING TO FIND OUT WHO SHE NEEDS TO SEE ABOUT GETTING A PAIN PUMP IN HER BACK.

## 2024-09-23 NOTE — TELEPHONE ENCOUNTER
NC. LVM informing pt of referral that was placed to [facility] & that she could discuss the pain pump with them.   Shared pain mgmt contact info  Also told her if she had any further questions she could reach back out.

## 2024-09-26 ENCOUNTER — OFFICE VISIT (OUTPATIENT)
Dept: INTERNAL MEDICINE | Age: 79
End: 2024-09-26
Payer: MEDICARE

## 2024-09-26 VITALS
HEART RATE: 90 BPM | OXYGEN SATURATION: 95 % | TEMPERATURE: 98.1 F | BODY MASS INDEX: 31.04 KG/M2 | SYSTOLIC BLOOD PRESSURE: 137 MMHG | HEIGHT: 59 IN | WEIGHT: 154 LBS | DIASTOLIC BLOOD PRESSURE: 88 MMHG

## 2024-09-26 DIAGNOSIS — I10 ESSENTIAL HYPERTENSION: ICD-10-CM

## 2024-09-26 DIAGNOSIS — G89.29 CHRONIC BILATERAL THORACIC BACK PAIN: Primary | ICD-10-CM

## 2024-09-26 DIAGNOSIS — F32.A DEPRESSION, UNSPECIFIED DEPRESSION TYPE: ICD-10-CM

## 2024-09-26 DIAGNOSIS — G25.81 RESTLESS LEG SYNDROME: ICD-10-CM

## 2024-09-26 DIAGNOSIS — G89.4 CHRONIC PAIN SYNDROME: ICD-10-CM

## 2024-09-26 DIAGNOSIS — E11.40 TYPE 2 DIABETES MELLITUS WITH DIABETIC NEUROPATHY, WITHOUT LONG-TERM CURRENT USE OF INSULIN: ICD-10-CM

## 2024-09-26 DIAGNOSIS — M48.062 SPINAL STENOSIS OF LUMBAR REGION WITH NEUROGENIC CLAUDICATION: ICD-10-CM

## 2024-09-26 DIAGNOSIS — E78.2 MIXED HYPERLIPIDEMIA: ICD-10-CM

## 2024-09-26 DIAGNOSIS — E55.9 VITAMIN D DEFICIENCY: ICD-10-CM

## 2024-09-26 DIAGNOSIS — M25.50 MULTIPLE JOINT PAIN: ICD-10-CM

## 2024-09-26 DIAGNOSIS — M51.369 DEGENERATION OF LUMBAR INTERVERTEBRAL DISC: ICD-10-CM

## 2024-09-26 DIAGNOSIS — M47.816 LUMBAR SPONDYLOSIS: ICD-10-CM

## 2024-09-26 DIAGNOSIS — D50.9 IRON DEFICIENCY ANEMIA, UNSPECIFIED IRON DEFICIENCY ANEMIA TYPE: ICD-10-CM

## 2024-09-26 DIAGNOSIS — M54.6 CHRONIC BILATERAL THORACIC BACK PAIN: Primary | ICD-10-CM

## 2024-09-26 DIAGNOSIS — M54.30 SCIATIC LEG PAIN: ICD-10-CM

## 2024-09-26 PROCEDURE — 99214 OFFICE O/P EST MOD 30 MIN: CPT | Performed by: INTERNAL MEDICINE

## 2024-09-26 PROCEDURE — 1126F AMNT PAIN NOTED NONE PRSNT: CPT | Performed by: INTERNAL MEDICINE

## 2024-09-26 PROCEDURE — 3075F SYST BP GE 130 - 139MM HG: CPT | Performed by: INTERNAL MEDICINE

## 2024-09-26 PROCEDURE — 3079F DIAST BP 80-89 MM HG: CPT | Performed by: INTERNAL MEDICINE

## 2024-09-26 PROCEDURE — G2211 COMPLEX E/M VISIT ADD ON: HCPCS | Performed by: INTERNAL MEDICINE

## 2024-11-15 NOTE — PROGRESS NOTES
Physical Therapy Daily Treatment Note                          Patient: Ivania Mattson   : 1945  Diagnosis/ICD-10 Code:  Chronic bilateral low back pain without sciatica [M54.50, G89.29]  Referring practitioner: Dillon Gardner, *  Date of Initial Visit: Type: THERAPY  Noted: 2024  Today's Date: 7/10/2024  Patient seen for 12 sessions           Subjective   The patient reported no new complaints and she is continuing to try and stand straight.     Objective   See Exercise, Manual, and Modality Logs for complete treatment.     Assessment/Plan     Continued gradual progression of strengthening and balance work. Adequate fatigue noted, no increased pain reported throughout. Significant improvements in patient's ability to self monitor and correct posture noted. Plan to continue POC.     Timed:  Manual Therapy:    0     mins  86138;  Therapeutic Exercise:    0     mins  00342;     Neuromuscular Sven:   0    mins  00075;    Therapeutic Activity:     20     mins  23767;     Gait Trainin     mins  72039;     Aquatics                         0      mins  82878    Un-timed:  Mechanical Traction      0     mins  62110  Dry Needling     0     mins self-pay  Electrical Stimulation:    0     mins  14229 ( );      Timed Treatment:   29   mins   Total Treatment:     29   mins    Daryl Cruz PT  Physical Therapist    Electronically signed 7/10/2024    [place] License: PT - [id number]                       No

## 2025-01-17 ENCOUNTER — LAB (OUTPATIENT)
Dept: LAB | Facility: HOSPITAL | Age: 80
End: 2025-01-17
Payer: MEDICARE

## 2025-01-17 DIAGNOSIS — M25.50 MULTIPLE JOINT PAIN: ICD-10-CM

## 2025-01-17 DIAGNOSIS — E55.9 VITAMIN D DEFICIENCY: ICD-10-CM

## 2025-01-17 DIAGNOSIS — E78.2 MIXED HYPERLIPIDEMIA: ICD-10-CM

## 2025-01-17 DIAGNOSIS — M48.062 SPINAL STENOSIS OF LUMBAR REGION WITH NEUROGENIC CLAUDICATION: ICD-10-CM

## 2025-01-17 DIAGNOSIS — D50.9 IRON DEFICIENCY ANEMIA, UNSPECIFIED IRON DEFICIENCY ANEMIA TYPE: ICD-10-CM

## 2025-01-17 DIAGNOSIS — F32.A DEPRESSION, UNSPECIFIED DEPRESSION TYPE: ICD-10-CM

## 2025-01-17 DIAGNOSIS — I10 ESSENTIAL HYPERTENSION: ICD-10-CM

## 2025-01-17 DIAGNOSIS — G89.29 CHRONIC BILATERAL THORACIC BACK PAIN: ICD-10-CM

## 2025-01-17 DIAGNOSIS — M47.816 LUMBAR SPONDYLOSIS: ICD-10-CM

## 2025-01-17 DIAGNOSIS — G89.4 CHRONIC PAIN SYNDROME: ICD-10-CM

## 2025-01-17 DIAGNOSIS — M54.30 SCIATIC LEG PAIN: ICD-10-CM

## 2025-01-17 DIAGNOSIS — M51.369 DEGENERATION OF LUMBAR INTERVERTEBRAL DISC: ICD-10-CM

## 2025-01-17 DIAGNOSIS — G25.81 RESTLESS LEG SYNDROME: ICD-10-CM

## 2025-01-17 DIAGNOSIS — M54.6 CHRONIC BILATERAL THORACIC BACK PAIN: ICD-10-CM

## 2025-01-17 DIAGNOSIS — E11.40 TYPE 2 DIABETES MELLITUS WITH DIABETIC NEUROPATHY, WITHOUT LONG-TERM CURRENT USE OF INSULIN: ICD-10-CM

## 2025-01-17 LAB
ALBUMIN SERPL-MCNC: 3.9 G/DL (ref 3.5–5.2)
ALBUMIN/GLOB SERPL: 1.1 G/DL
ALP SERPL-CCNC: 70 U/L (ref 39–117)
ALT SERPL W P-5'-P-CCNC: 11 U/L (ref 1–33)
ANION GAP SERPL CALCULATED.3IONS-SCNC: 11.6 MMOL/L (ref 5–15)
AST SERPL-CCNC: 26 U/L (ref 1–32)
BASOPHILS # BLD AUTO: 0.02 10*3/MM3 (ref 0–0.2)
BASOPHILS NFR BLD AUTO: 0.4 % (ref 0–1.5)
BILIRUB SERPL-MCNC: 0.4 MG/DL (ref 0–1.2)
BUN SERPL-MCNC: 11 MG/DL (ref 8–23)
BUN/CREAT SERPL: 10.6 (ref 7–25)
CALCIUM SPEC-SCNC: 9.5 MG/DL (ref 8.6–10.5)
CHLORIDE SERPL-SCNC: 103 MMOL/L (ref 98–107)
CO2 SERPL-SCNC: 26.4 MMOL/L (ref 22–29)
CREAT SERPL-MCNC: 1.04 MG/DL (ref 0.57–1)
DEPRECATED RDW RBC AUTO: 45.5 FL (ref 37–54)
EGFRCR SERPLBLD CKD-EPI 2021: 54.1 ML/MIN/1.73
EOSINOPHIL # BLD AUTO: 0.04 10*3/MM3 (ref 0–0.4)
EOSINOPHIL NFR BLD AUTO: 0.7 % (ref 0.3–6.2)
ERYTHROCYTE [DISTWIDTH] IN BLOOD BY AUTOMATED COUNT: 13.4 % (ref 12.3–15.4)
GLOBULIN UR ELPH-MCNC: 3.5 GM/DL
GLUCOSE SERPL-MCNC: 69 MG/DL (ref 65–99)
HBA1C MFR BLD: 5.6 % (ref 4.8–5.6)
HCT VFR BLD AUTO: 45.3 % (ref 34–46.6)
HGB BLD-MCNC: 15.5 G/DL (ref 12–15.9)
IMM GRANULOCYTES # BLD AUTO: 0.03 10*3/MM3 (ref 0–0.05)
IMM GRANULOCYTES NFR BLD AUTO: 0.5 % (ref 0–0.5)
LYMPHOCYTES # BLD AUTO: 1.91 10*3/MM3 (ref 0.7–3.1)
LYMPHOCYTES NFR BLD AUTO: 34.5 % (ref 19.6–45.3)
MCH RBC QN AUTO: 31.8 PG (ref 26.6–33)
MCHC RBC AUTO-ENTMCNC: 34.2 G/DL (ref 31.5–35.7)
MCV RBC AUTO: 93 FL (ref 79–97)
MONOCYTES # BLD AUTO: 0.62 10*3/MM3 (ref 0.1–0.9)
MONOCYTES NFR BLD AUTO: 11.2 % (ref 5–12)
NEUTROPHILS NFR BLD AUTO: 2.92 10*3/MM3 (ref 1.7–7)
NEUTROPHILS NFR BLD AUTO: 52.7 % (ref 42.7–76)
NRBC BLD AUTO-RTO: 0 /100 WBC (ref 0–0.2)
PLATELET # BLD AUTO: 232 10*3/MM3 (ref 140–450)
PMV BLD AUTO: 10.1 FL (ref 6–12)
POTASSIUM SERPL-SCNC: 4.4 MMOL/L (ref 3.5–5.2)
PROT SERPL-MCNC: 7.4 G/DL (ref 6–8.5)
RBC # BLD AUTO: 4.87 10*6/MM3 (ref 3.77–5.28)
SODIUM SERPL-SCNC: 141 MMOL/L (ref 136–145)
WBC NRBC COR # BLD AUTO: 5.54 10*3/MM3 (ref 3.4–10.8)

## 2025-01-17 PROCEDURE — 83036 HEMOGLOBIN GLYCOSYLATED A1C: CPT

## 2025-01-17 PROCEDURE — 85025 COMPLETE CBC W/AUTO DIFF WBC: CPT

## 2025-01-17 PROCEDURE — 80053 COMPREHEN METABOLIC PANEL: CPT

## 2025-01-17 PROCEDURE — 36415 COLL VENOUS BLD VENIPUNCTURE: CPT

## 2025-01-27 ENCOUNTER — OFFICE VISIT (OUTPATIENT)
Dept: INTERNAL MEDICINE | Age: 80
End: 2025-01-27
Payer: MEDICARE

## 2025-01-27 VITALS
DIASTOLIC BLOOD PRESSURE: 81 MMHG | HEIGHT: 59 IN | WEIGHT: 154 LBS | BODY MASS INDEX: 31.04 KG/M2 | TEMPERATURE: 98.2 F | SYSTOLIC BLOOD PRESSURE: 138 MMHG | OXYGEN SATURATION: 91 %

## 2025-01-27 DIAGNOSIS — Z00.00 MEDICARE ANNUAL WELLNESS VISIT, SUBSEQUENT: Primary | ICD-10-CM

## 2025-01-27 DIAGNOSIS — E66.9 OBESITY (BMI 30-39.9): ICD-10-CM

## 2025-01-27 DIAGNOSIS — E11.9 DIABETES MELLITUS WITH HEMOGLOBIN A1C GOAL OF 7.0%-8.0%: ICD-10-CM

## 2025-01-27 DIAGNOSIS — Z80.0 FAMILY HISTORY OF COLON CANCER: ICD-10-CM

## 2025-01-27 DIAGNOSIS — E11.40 TYPE 2 DIABETES MELLITUS WITH DIABETIC NEUROPATHY, WITHOUT LONG-TERM CURRENT USE OF INSULIN: ICD-10-CM

## 2025-01-27 DIAGNOSIS — F32.A DEPRESSION, UNSPECIFIED DEPRESSION TYPE: ICD-10-CM

## 2025-01-27 DIAGNOSIS — G25.81 RESTLESS LEG SYNDROME: ICD-10-CM

## 2025-01-27 DIAGNOSIS — M48.062 SPINAL STENOSIS OF LUMBAR REGION WITH NEUROGENIC CLAUDICATION: ICD-10-CM

## 2025-01-27 DIAGNOSIS — E78.2 MIXED HYPERLIPIDEMIA: ICD-10-CM

## 2025-01-27 DIAGNOSIS — I10 ESSENTIAL HYPERTENSION: ICD-10-CM

## 2025-01-27 DIAGNOSIS — D50.9 IRON DEFICIENCY ANEMIA, UNSPECIFIED IRON DEFICIENCY ANEMIA TYPE: ICD-10-CM

## 2025-01-27 DIAGNOSIS — G89.4 CHRONIC PAIN SYNDROME: ICD-10-CM

## 2025-01-27 DIAGNOSIS — I50.32 CHRONIC DIASTOLIC CONGESTIVE HEART FAILURE: ICD-10-CM

## 2025-01-27 DIAGNOSIS — E55.9 VITAMIN D DEFICIENCY: ICD-10-CM

## 2025-01-27 PROCEDURE — 1170F FXNL STATUS ASSESSED: CPT | Performed by: INTERNAL MEDICINE

## 2025-01-27 PROCEDURE — 99214 OFFICE O/P EST MOD 30 MIN: CPT | Performed by: INTERNAL MEDICINE

## 2025-01-27 PROCEDURE — G2211 COMPLEX E/M VISIT ADD ON: HCPCS | Performed by: INTERNAL MEDICINE

## 2025-01-27 PROCEDURE — 96160 PT-FOCUSED HLTH RISK ASSMT: CPT | Performed by: INTERNAL MEDICINE

## 2025-01-27 PROCEDURE — 3075F SYST BP GE 130 - 139MM HG: CPT | Performed by: INTERNAL MEDICINE

## 2025-01-27 PROCEDURE — 1125F AMNT PAIN NOTED PAIN PRSNT: CPT | Performed by: INTERNAL MEDICINE

## 2025-01-27 PROCEDURE — G0439 PPPS, SUBSEQ VISIT: HCPCS | Performed by: INTERNAL MEDICINE

## 2025-01-27 PROCEDURE — 3079F DIAST BP 80-89 MM HG: CPT | Performed by: INTERNAL MEDICINE

## 2025-01-27 RX ORDER — HYDROCODONE BITARTRATE AND ACETAMINOPHEN 5; 325 MG/1; MG/1
1 TABLET ORAL EVERY 6 HOURS PRN
Qty: 25 TABLET | Refills: 0 | Status: SHIPPED | OUTPATIENT
Start: 2025-01-27

## 2025-01-27 NOTE — ASSESSMENT & PLAN NOTE
Orders:    HYDROcodone-acetaminophen (Norco) 5-325 MG per tablet; Take 1 tablet by mouth Every 6 (Six) Hours As Needed for Moderate Pain.    Comprehensive Metabolic Panel; Future    CBC & Differential; Future    Hemoglobin A1c; Future    Urine Drug Screen - Urine, Clean Catch; Future     Patient's anemia is currently controlled. Has recieved 2 units of PRBCs on 7/7. Etiology likely d/t Anemia of acute illness + Hemolytic anemia  Retic count elevated   Will cont to trend Hn   Current CBC reviewed-   Lab Results   Component Value Date    HGB 7.4 (L) 07/10/2022    HCT 21.1 (L) 07/10/2022     Monitor serial CBC and transfuse if patient becomes hemodynamically unstable, symptomatic or H/H drops below 7/21.

## 2025-01-27 NOTE — ASSESSMENT & PLAN NOTE
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 Depression is in partial remission and stable.    Plan:   Continue current medication therapy     Followup in 6 months.     Orders:    HYDROcodone-acetaminophen (Norco) 5-325 MG per tablet; Take 1 tablet by mouth Every 6 (Six) Hours As Needed for Moderate Pain.    Comprehensive Metabolic Panel; Future    CBC & Differential; Future    Hemoglobin A1c; Future    Urine Drug Screen - Urine, Clean Catch; Future

## 2025-01-27 NOTE — ASSESSMENT & PLAN NOTE
Patient's (Body mass index is 31.09 kg/m².) indicates that they are obese (BMI >30) with health conditions that include osteoarthritis . Weight is unchanged. BMI  is above average; BMI management plan is completed. We discussed portion control and increasing exercise.     Orders:    HYDROcodone-acetaminophen (Norco) 5-325 MG per tablet; Take 1 tablet by mouth Every 6 (Six) Hours As Needed for Moderate Pain.    Comprehensive Metabolic Panel; Future    CBC & Differential; Future    Hemoglobin A1c; Future    Urine Drug Screen - Urine, Clean Catch; Future

## 2025-01-27 NOTE — ASSESSMENT & PLAN NOTE
Diabetes is improving with treatment.   Continue current treatment regimen.  Diabetes will be reassessed in 6 months    Orders:    HYDROcodone-acetaminophen (Norco) 5-325 MG per tablet; Take 1 tablet by mouth Every 6 (Six) Hours As Needed for Moderate Pain.    Comprehensive Metabolic Panel; Future    CBC & Differential; Future    Hemoglobin A1c; Future    Urine Drug Screen - Urine, Clean Catch; Future

## 2025-01-27 NOTE — PROGRESS NOTES
Subjective   The ABCs of the Annual Wellness Visit  Medicare Wellness Visit      Ivania Mattson is a 79 y.o. patient who presents for a Medicare Wellness Visit.    The following portions of the patient's history were reviewed and   updated as appropriate: allergies, current medications, past family history, past medical history, past social history, past surgical history, and problem list.    Compared to one year ago, the patient's physical   health is the same.  Compared to one year ago, the patient's mental   health is the same.    Recent Hospitalizations:  She was not admitted to the hospital during the last year.     Current Medical Providers:  Patient Care Team:  Dillon Gardner MD as PCP - General (Internal Medicine)    Outpatient Medications Prior to Visit   Medication Sig Dispense Refill    Accu-Chek Softclix Lancets lancets 1 each by Other route 3 (Three) Times a Day. 300 each 3    Blood Glucose Calibration (Accu-Chek Lyndsey) solution       Cholecalciferol (VITAMIN D3 PO) Take 500 mg by mouth Daily.      Cyanocobalamin (B-12) 5000 MCG capsule Take 5,000 mcg by mouth Daily.      DULoxetine (CYMBALTA) 60 MG capsule TAKE 1 CAPSULE EVERY NIGHT 90 capsule 3    glucose blood (Accu-Chek Lyndsey Plus) test strip 1 each by Other route 3 (Three) Times a Day. 300 each 3    metoprolol succinate XL (TOPROL-XL) 25 MG 24 hr tablet TAKE 1 TABLET TWICE DAILY 180 tablet 3    nitroglycerin (Nitrostat) 0.4 MG SL tablet Place 1 tablet under the tongue Every 5 (Five) Minutes As Needed for Chest Pain. Take no more than 3 doses in 15 minutes. 30 tablet 12    rOPINIRole (REQUIP) 3 MG tablet TAKE 1 TABLET THREE TIMES DAILY 270 tablet 3    HYDROcodone-acetaminophen (Norco) 5-325 MG per tablet Take 1 tablet by mouth Every 6 (Six) Hours As Needed for Moderate Pain. 25 tablet 0    aspirin 81 MG tablet Take 1 tablet by mouth Daily. (Patient not taking: Reported on 1/27/2025)      isosorbide mononitrate (IMDUR) 30 MG 24 hr tablet  Take 1 tablet by mouth Daily. (Patient not taking: Reported on 1/27/2025) 90 tablet 1    rosuvastatin (Crestor) 20 MG tablet Take 1 tablet by mouth Daily. (Patient not taking: Reported on 1/27/2025) 90 tablet 3     No facility-administered medications prior to visit.     Opioid medication/s are on active medication list.  and I have evaluated her active treatment plan and pain score trends (see table).  Vitals:    01/27/25 1353   PainSc:   8     I have reviewed the chart for potential of high risk medication and harmful drug interactions in the elderly.        Aspirin is not on active medication list.  Aspirin use is not indicated based on review of current medical condition/s. Risk of harm outweighs potential benefits.  .    Patient Active Problem List   Diagnosis    Depression    Edema    Fatigue    Mixed hyperlipidemia    Essential hypertension    Chronic bilateral low back pain without sciatica    Multiple joint pain    Obesity (BMI 30-39.9)    Obstructive apnea    Rheumatoid arthritis    Dietary counseling    Regurgitation    Sciatic leg pain    Abnormal EKG    Bladder disorder    Congestive heart failure    COPD (chronic obstructive pulmonary disease)    Degeneration of lumbar intervertebral disc    Diabetes mellitus with hemoglobin A1c goal of 7.0%-8.0%    Diverticulitis    Hip pain    Limb swelling    Lumbar adjacent segment disease with spondylolisthesis    Lumbar spondylosis    Lumbar spinal stenosis    MRSA nasal colonization    Type 2 diabetes mellitus with diabetic neuropathy, without long-term current use of insulin    Restless leg syndrome    Vitamin D deficiency    History of colon polyps    Family history of colon cancer    Diarrhea    Other forms of angina pectoris    Severe right groin pain    Iron deficiency    Iron deficiency anemia    Medicare annual wellness visit, subsequent    Chronic pain syndrome    Non-recurrent acute suppurative otitis media of left ear without spontaneous rupture of  "tympanic membrane    Chronic left-sided headache    Chronic bilateral thoracic back pain    Functional diplopia     Advance Care Planning Advance Directive is not on file.  Patient does not have an advance directive on file as of this time, considering making 1 for her and her ,            Objective   Vitals:    25 1353   BP: 138/81   BP Location: Right arm   Patient Position: Sitting   Temp: 98.2 °F (36.8 °C)   SpO2: 91%   Weight: 69.9 kg (154 lb)   Height: 149.9 cm (59.02\")   PainSc:   8       Estimated body mass index is 31.09 kg/m² as calculated from the following:    Height as of this encounter: 149.9 cm (59.02\").    Weight as of this encounter: 69.9 kg (154 lb).                Does the patient have evidence of cognitive impairment? No  Lab Results   Component Value Date    HGBA1C 5.60 2025                                                                                               Health  Risk Assessment    Smoking Status:  Social History     Tobacco Use   Smoking Status Every Day    Current packs/day: 0.00    Average packs/day: 0.5 packs/day for 20.0 years (10.0 ttl pk-yrs)    Types: Cigarettes    Start date:     Last attempt to quit:     Years since quittin.0    Passive exposure: Past   Smokeless Tobacco Never   Tobacco Comments    Tries to make 2 packs last a week.     Alcohol Consumption:  Social History     Substance and Sexual Activity   Alcohol Use No       Fall Risk Screen  STEADI Fall Risk Assessment was completed, and patient is at HIGH risk for falls. Assessment completed on:2025    Depression Screening   Little interest or pleasure in doing things? Not at all   Feeling down, depressed, or hopeless? Not at all   PHQ-2 Total Score 0      Health Habits and Functional and Cognitive Screenin/27/2025     1:00 PM   Functional & Cognitive Status   Do you have difficulty preparing food and eating? Yes   Do you have difficulty bathing yourself, getting dressed or " grooming yourself? No   Do you have difficulty using the toilet? No   Do you have difficulty moving around from place to place? Yes   Do you have trouble with steps or getting out of a bed or a chair? Yes   Current Diet Other   Dental Exam Not up to date   Eye Exam Not up to date   Exercise (times per week) 0 times per week   Current Exercises Include No Regular Exercise   Do you need help using the phone?  No   Are you deaf or do you have serious difficulty hearing?  No   Do you need help to go to places out of walking distance? Yes   Do you need help shopping? Yes   Do you need help preparing meals?  Yes   Do you need help with housework?  Yes   Do you need help with laundry? Yes   Do you need help taking your medications? No   Do you need help managing money? No   Do you ever drive or ride in a car without wearing a seat belt? Yes   Have you felt unusual stress, anger or loneliness in the last month? No   Who do you live with? Spouse   If you need help, do you have trouble finding someone available to you? No   Have you been bothered in the last four weeks by sexual problems? No   Do you have difficulty concentrating, remembering or making decisions? No           Age-appropriate Screening Schedule:  Refer to the list below for future screening recommendations based on patient's age, sex and/or medical conditions. Orders for these recommended tests are listed in the plan section. The patient has been provided with a written plan.    Health Maintenance List  Health Maintenance   Topic Date Due    DIABETIC EYE EXAM  Never done    ZOSTER VACCINE (1 of 2) Never done    Pneumococcal Vaccine 65+ (2 of 2 - PCV) 09/23/2015    RSV Vaccine - Adults (1 - 1-dose 75+ series) Never done    INFLUENZA VACCINE  07/01/2024    COVID-19 Vaccine (3 - 2024-25 season) 09/01/2024    BMI FOLLOWUP  04/30/2025    HEMOGLOBIN A1C  07/17/2025    LIPID PANEL  09/06/2025    URINE MICROALBUMIN  09/06/2025    ANNUAL WELLNESS VISIT  01/27/2026     DXA SCAN  05/20/2026    COLORECTAL CANCER SCREENING  03/08/2027    TDAP/TD VACCINES (3 - Tdap) 04/19/2033    MAMMOGRAM  Discontinued                                                                                                                                                CMS Preventative Services Quick Reference  Risk Factors Identified During Encounter  Tobacco Use/Dependance Risk (use dotphrase .tobaccocessation for documentation)    The above risks/problems have been discussed with the patient.  Pertinent information has been shared with the patient in the After Visit Summary.  An After Visit Summary and PPPS were made available to the patient.    Follow Up:   Next Medicare Wellness visit to be scheduled in 1 year.     Assessment & Plan  Medicare annual wellness visit, subsequent    Orders:    HYDROcodone-acetaminophen (Norco) 5-325 MG per tablet; Take 1 tablet by mouth Every 6 (Six) Hours As Needed for Moderate Pain.    Comprehensive Metabolic Panel; Future    CBC & Differential; Future    Hemoglobin A1c; Future    Urine Drug Screen - Urine, Clean Catch; Future    Family history of colon cancer    Orders:    HYDROcodone-acetaminophen (Norco) 5-325 MG per tablet; Take 1 tablet by mouth Every 6 (Six) Hours As Needed for Moderate Pain.    Comprehensive Metabolic Panel; Future    CBC & Differential; Future    Hemoglobin A1c; Future    Urine Drug Screen - Urine, Clean Catch; Future    Diabetes mellitus with hemoglobin A1c goal of 7.0%-8.0%  Diabetes is improving with treatment.   Continue current treatment regimen.  Diabetes will be reassessed in 6 months    Orders:    HYDROcodone-acetaminophen (Norco) 5-325 MG per tablet; Take 1 tablet by mouth Every 6 (Six) Hours As Needed for Moderate Pain.    Comprehensive Metabolic Panel; Future    CBC & Differential; Future    Hemoglobin A1c; Future    Urine Drug Screen - Urine, Clean Catch; Future    Essential hypertension  Hypertension is stable and controlled  Continue  current treatment regimen.  Blood pressure will be reassessed in 6 months.    Orders:    HYDROcodone-acetaminophen (Norco) 5-325 MG per tablet; Take 1 tablet by mouth Every 6 (Six) Hours As Needed for Moderate Pain.    Comprehensive Metabolic Panel; Future    CBC & Differential; Future    Hemoglobin A1c; Future    Urine Drug Screen - Urine, Clean Catch; Future    Spinal stenosis of lumbar region with neurogenic claudication    Orders:    HYDROcodone-acetaminophen (Norco) 5-325 MG per tablet; Take 1 tablet by mouth Every 6 (Six) Hours As Needed for Moderate Pain.    Comprehensive Metabolic Panel; Future    CBC & Differential; Future    Hemoglobin A1c; Future    Urine Drug Screen - Urine, Clean Catch; Future    Iron deficiency anemia, unspecified iron deficiency anemia type    Orders:    HYDROcodone-acetaminophen (Norco) 5-325 MG per tablet; Take 1 tablet by mouth Every 6 (Six) Hours As Needed for Moderate Pain.    Comprehensive Metabolic Panel; Future    CBC & Differential; Future    Hemoglobin A1c; Future    Urine Drug Screen - Urine, Clean Catch; Future    Restless leg syndrome    Orders:    HYDROcodone-acetaminophen (Norco) 5-325 MG per tablet; Take 1 tablet by mouth Every 6 (Six) Hours As Needed for Moderate Pain.    Comprehensive Metabolic Panel; Future    CBC & Differential; Future    Hemoglobin A1c; Future    Urine Drug Screen - Urine, Clean Catch; Future    Mixed hyperlipidemia   Lipid abnormalities are stable    Plan:  Continue same medication/s without change.      Discussed medication dosage, use, side effects, and goals of treatment in detail.    Counseled patient on lifestyle modifications to help control hyperlipidemia.     Patient Treatment Goals:   LDL goal is less than 70    Followup in 6 months.    Orders:    HYDROcodone-acetaminophen (Norco) 5-325 MG per tablet; Take 1 tablet by mouth Every 6 (Six) Hours As Needed for Moderate Pain.    Comprehensive Metabolic Panel; Future    CBC & Differential;  Future    Hemoglobin A1c; Future    Urine Drug Screen - Urine, Clean Catch; Future    Chronic diastolic congestive heart failure  Congestive heart failure due to coronary artery disease (CAD).  Heart failure is stable.  NYHA Class II.  Continue current treatment regimen.  Heart failure will be reassessed in 6 months.        Orders:    HYDROcodone-acetaminophen (Norco) 5-325 MG per tablet; Take 1 tablet by mouth Every 6 (Six) Hours As Needed for Moderate Pain.    Comprehensive Metabolic Panel; Future    CBC & Differential; Future    Hemoglobin A1c; Future    Urine Drug Screen - Urine, Clean Catch; Future    Obesity (BMI 30-39.9)  Patient's (Body mass index is 31.09 kg/m².) indicates that they are obese (BMI >30) with health conditions that include osteoarthritis . Weight is unchanged. BMI  is above average; BMI management plan is completed. We discussed portion control and increasing exercise.     Orders:    HYDROcodone-acetaminophen (Norco) 5-325 MG per tablet; Take 1 tablet by mouth Every 6 (Six) Hours As Needed for Moderate Pain.    Comprehensive Metabolic Panel; Future    CBC & Differential; Future    Hemoglobin A1c; Future    Urine Drug Screen - Urine, Clean Catch; Future    Type 2 diabetes mellitus with diabetic neuropathy, without long-term current use of insulin  Diabetes is improving with treatment.   Continue current treatment regimen.  Diabetes will be reassessed in 6 months    Orders:    HYDROcodone-acetaminophen (Norco) 5-325 MG per tablet; Take 1 tablet by mouth Every 6 (Six) Hours As Needed for Moderate Pain.    Comprehensive Metabolic Panel; Future    CBC & Differential; Future    Hemoglobin A1c; Future    Urine Drug Screen - Urine, Clean Catch; Future    Vitamin D deficiency    Orders:    HYDROcodone-acetaminophen (Norco) 5-325 MG per tablet; Take 1 tablet by mouth Every 6 (Six) Hours As Needed for Moderate Pain.    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 Depression is in partial remission and stable.    Plan:   Continue current medication therapy     Followup in 6 months.     Orders:    HYDROcodone-acetaminophen (Norco) 5-325 MG per tablet; Take 1 tablet by mouth Every 6 (Six) Hours As Needed for Moderate Pain.    Comprehensive Metabolic Panel; Future    CBC & Differential; Future    Hemoglobin A1c; Future    Urine Drug Screen - Urine, Clean Catch; Future    Chronic pain syndrome    Orders:    HYDROcodone-acetaminophen (Norco) 5-325 MG per tablet; Take 1 tablet by mouth Every 6 (Six) Hours As Needed for Moderate Pain.    Comprehensive Metabolic Panel; Future    CBC & Differential; Future    Hemoglobin A1c; Future    Urine Drug Screen - Urine, Clean Catch; Future         Follow Up:   No follow-ups on file.

## 2025-01-27 NOTE — ASSESSMENT & PLAN NOTE
Congestive heart failure due to coronary artery disease (CAD).  Heart failure is stable.  NYHA Class II.  Continue current treatment regimen.  Heart failure will be reassessed in 6 months.        Orders:    HYDROcodone-acetaminophen (Norco) 5-325 MG per tablet; Take 1 tablet by mouth Every 6 (Six) Hours As Needed for Moderate Pain.    Comprehensive Metabolic Panel; Future    CBC & Differential; Future    Hemoglobin A1c; Future    Urine Drug Screen - Urine, Clean Catch; Future

## 2025-01-27 NOTE — PROGRESS NOTES
"Medicare Wellness-subsequent (Wellness visit, lab follow up. Med refills. Pt has stopped some medications. )    Objective   Vital Signs  Vitals:    01/27/25 1353   BP: 138/81   BP Location: Right arm   Patient Position: Sitting   Temp: 98.2 °F (36.8 °C)   SpO2: 91%   Weight: 69.9 kg (154 lb)   Height: 149.9 cm (59.02\")      Body mass index is 31.09 kg/m².  Review of Systems   Constitutional:  Positive for unexpected weight loss.   HENT: Negative.     Eyes: Negative.    Respiratory: Negative.     Cardiovascular: Negative.    Gastrointestinal: Negative.    Endocrine: Negative.    Genitourinary: Negative.    Musculoskeletal: Negative.    Allergic/Immunologic: Negative.    Neurological: Negative.    Hematological: Negative.    Psychiatric/Behavioral: Negative.      Loising wgt ? , appetite down some     Physical Exam  Constitutional:       General: She is not in acute distress.     Appearance: Normal appearance. She is obese.   HENT:      Head: Normocephalic.      Mouth/Throat:      Mouth: Mucous membranes are moist.   Eyes:      Conjunctiva/sclera: Conjunctivae normal.      Pupils: Pupils are equal, round, and reactive to light.   Cardiovascular:      Rate and Rhythm: Normal rate and regular rhythm.      Pulses: Normal pulses.      Heart sounds: Normal heart sounds.   Pulmonary:      Effort: Pulmonary effort is normal.      Breath sounds: Normal breath sounds.   Abdominal:      General: Bowel sounds are normal.      Palpations: Abdomen is soft.   Musculoskeletal:         General: Deformity (of thoracic spine with palpable previous hardware spinous processes,) present. No swelling. Normal range of motion.      Cervical back: Neck supple.   Skin:     General: Skin is warm and dry.      Coloration: Skin is not jaundiced.   Neurological:      General: No focal deficit present.      Mental Status: She is alert and oriented to person, place, and time. Mental status is at baseline.   Psychiatric:         Mood and Affect: Mood " normal.         Behavior: Behavior normal.         Thought Content: Thought content normal.         Judgment: Judgment normal.        Result Review :   Lab Results   Component Value Date    PROBNP 89.7 03/21/2023    PROBNP 90.1 05/25/2022    PROBNP 44.6 10/11/2021    BNP 80 05/20/2019     05/16/2019    BNP 4275 (H) 05/12/2019     CMP          7/24/2024    08:55 9/6/2024    08:57 1/17/2025    12:43   CMP   Glucose  121  69    BUN  11  11    Creatinine 0.90  1.09  1.04    EGFR 64.8  51.5  54.1    Sodium  142  141    Potassium  4.6  4.4    Chloride  102  103    Calcium  10.0  9.5    Total Protein  7.1  7.4    Albumin  4.3  3.9    Globulin  2.8  3.5    Total Bilirubin  0.3  0.4    Alkaline Phosphatase  74  70    AST (SGOT)  26  26    ALT (SGPT)  12  11    Albumin/Globulin Ratio  1.5  1.1    BUN/Creatinine Ratio  10.1  10.6    Anion Gap  12.0  11.6      CBC w/diff          6/3/2024    10:26 9/6/2024    08:57 1/17/2025    12:43   CBC w/Diff   WBC 5.50  9.22  5.54    RBC 4.79  4.94  4.87    Hemoglobin 14.7  15.1  15.5    Hematocrit 44.1  44.6  45.3    MCV 92.1  90.3  93.0    MCH 30.7  30.6  31.8    MCHC 33.3  33.9  34.2    RDW 13.2  13.8  13.4    Platelets 215  232  232    Neutrophil Rel % 54.5  74.0  52.7    Immature Granulocyte Rel % 0.4  0.3  0.5    Lymphocyte Rel % 33.8  18.0  34.5    Monocyte Rel % 9.6  6.6  11.2    Eosinophil Rel % 1.5  0.9  0.7    Basophil Rel % 0.2  0.2  0.4       Lipid Panel          6/3/2024    10:26 9/6/2024    08:57   Lipid Panel   Total Cholesterol 181  168    Triglycerides 113  106    HDL Cholesterol 39  38    VLDL Cholesterol 21  19    LDL Cholesterol  121  111    LDL/HDL Ratio 3.06  2.86       Lab Results   Component Value Date    TSH 2.310 09/06/2024    TSH 1.860 04/14/2023    TSH 2.030 05/25/2022      Lab Results   Component Value Date    FREET4 1.01 09/06/2024    FREET4 1.08 05/25/2022    FREET4 1.4 05/10/2019      A1C Last 3 Results          6/3/2024    10:26 9/6/2024    08:57  1/17/2025    12:43   HGBA1C Last 3 Results   Hemoglobin A1C 5.90  5.80  5.60                        Visit Diagnoses:    ICD-10-CM ICD-9-CM   1. Medicare annual wellness visit, subsequent  Z00.00 V70.0   2. Family history of colon cancer  Z80.0 V16.0   3. Diabetes mellitus with hemoglobin A1c goal of 7.0%-8.0%  E11.9 250.00   4. Essential hypertension  I10 401.9   5. Spinal stenosis of lumbar region with neurogenic claudication  M48.062 724.03   6. Iron deficiency anemia, unspecified iron deficiency anemia type  D50.9 280.9   7. Restless leg syndrome  G25.81 333.94   8. Mixed hyperlipidemia  E78.2 272.2   9. Chronic diastolic congestive heart failure  I50.32 428.32     428.0   10. Obesity (BMI 30-39.9)  E66.9 278.00   11. Type 2 diabetes mellitus with diabetic neuropathy, without long-term current use of insulin  E11.40 250.60     357.2   12. Vitamin D deficiency  E55.9 268.9   13. Depression, unspecified depression type  F32.A 311   14. Chronic pain syndrome  G89.4 338.4       Assessment and Plan   Diagnoses and all orders for this visit:    1. Medicare annual wellness visit, subsequent (Primary)  -     HYDROcodone-acetaminophen (Norco) 5-325 MG per tablet; Take 1 tablet by mouth Every 6 (Six) Hours As Needed for Moderate Pain.  Dispense: 25 tablet; Refill: 0  -     Comprehensive Metabolic Panel; Future  -     CBC & Differential; Future  -     Hemoglobin A1c; Future  -     Urine Drug Screen - Urine, Clean Catch; Future    2. Family history of colon cancer  -     HYDROcodone-acetaminophen (Norco) 5-325 MG per tablet; Take 1 tablet by mouth Every 6 (Six) Hours As Needed for Moderate Pain.  Dispense: 25 tablet; Refill: 0  -     Comprehensive Metabolic Panel; Future  -     CBC & Differential; Future  -     Hemoglobin A1c; Future  -     Urine Drug Screen - Urine, Clean Catch; Future    3. Diabetes mellitus with hemoglobin A1c goal of 7.0%-8.0%  -     HYDROcodone-acetaminophen (Norco) 5-325 MG per tablet; Take 1 tablet by  mouth Every 6 (Six) Hours As Needed for Moderate Pain.  Dispense: 25 tablet; Refill: 0  -     Comprehensive Metabolic Panel; Future  -     CBC & Differential; Future  -     Hemoglobin A1c; Future  -     Urine Drug Screen - Urine, Clean Catch; Future    4. Essential hypertension  -     HYDROcodone-acetaminophen (Norco) 5-325 MG per tablet; Take 1 tablet by mouth Every 6 (Six) Hours As Needed for Moderate Pain.  Dispense: 25 tablet; Refill: 0  -     Comprehensive Metabolic Panel; Future  -     CBC & Differential; Future  -     Hemoglobin A1c; Future  -     Urine Drug Screen - Urine, Clean Catch; Future    5. Spinal stenosis of lumbar region with neurogenic claudication  -     HYDROcodone-acetaminophen (Norco) 5-325 MG per tablet; Take 1 tablet by mouth Every 6 (Six) Hours As Needed for Moderate Pain.  Dispense: 25 tablet; Refill: 0  -     Comprehensive Metabolic Panel; Future  -     CBC & Differential; Future  -     Hemoglobin A1c; Future  -     Urine Drug Screen - Urine, Clean Catch; Future    6. Iron deficiency anemia, unspecified iron deficiency anemia type  -     HYDROcodone-acetaminophen (Norco) 5-325 MG per tablet; Take 1 tablet by mouth Every 6 (Six) Hours As Needed for Moderate Pain.  Dispense: 25 tablet; Refill: 0  -     Comprehensive Metabolic Panel; Future  -     CBC & Differential; Future  -     Hemoglobin A1c; Future  -     Urine Drug Screen - Urine, Clean Catch; Future    7. Restless leg syndrome  -     HYDROcodone-acetaminophen (Norco) 5-325 MG per tablet; Take 1 tablet by mouth Every 6 (Six) Hours As Needed for Moderate Pain.  Dispense: 25 tablet; Refill: 0  -     Comprehensive Metabolic Panel; Future  -     CBC & Differential; Future  -     Hemoglobin A1c; Future  -     Urine Drug Screen - Urine, Clean Catch; Future    8. Mixed hyperlipidemia  -     HYDROcodone-acetaminophen (Norco) 5-325 MG per tablet; Take 1 tablet by mouth Every 6 (Six) Hours As Needed for Moderate Pain.  Dispense: 25 tablet;  Refill: 0  -     Comprehensive Metabolic Panel; Future  -     CBC & Differential; Future  -     Hemoglobin A1c; Future  -     Urine Drug Screen - Urine, Clean Catch; Future    9. Chronic diastolic congestive heart failure  -     HYDROcodone-acetaminophen (Norco) 5-325 MG per tablet; Take 1 tablet by mouth Every 6 (Six) Hours As Needed for Moderate Pain.  Dispense: 25 tablet; Refill: 0  -     Comprehensive Metabolic Panel; Future  -     CBC & Differential; Future  -     Hemoglobin A1c; Future  -     Urine Drug Screen - Urine, Clean Catch; Future    10. Obesity (BMI 30-39.9)  -     HYDROcodone-acetaminophen (Norco) 5-325 MG per tablet; Take 1 tablet by mouth Every 6 (Six) Hours As Needed for Moderate Pain.  Dispense: 25 tablet; Refill: 0  -     Comprehensive Metabolic Panel; Future  -     CBC & Differential; Future  -     Hemoglobin A1c; Future  -     Urine Drug Screen - Urine, Clean Catch; Future    11. Type 2 diabetes mellitus with diabetic neuropathy, without long-term current use of insulin  -     HYDROcodone-acetaminophen (Norco) 5-325 MG per tablet; Take 1 tablet by mouth Every 6 (Six) Hours As Needed for Moderate Pain.  Dispense: 25 tablet; Refill: 0  -     Comprehensive Metabolic Panel; Future  -     CBC & Differential; Future  -     Hemoglobin A1c; Future  -     Urine Drug Screen - Urine, Clean Catch; Future    12. Vitamin D deficiency  -     HYDROcodone-acetaminophen (Norco) 5-325 MG per tablet; Take 1 tablet by mouth Every 6 (Six) Hours As Needed for Moderate Pain.  Dispense: 25 tablet; Refill: 0  -     Comprehensive Metabolic Panel; Future  -     CBC & Differential; Future  -     Hemoglobin A1c; Future  -     Urine Drug Screen - Urine, Clean Catch; Future    13. Depression, unspecified depression type  -     HYDROcodone-acetaminophen (Norco) 5-325 MG per tablet; Take 1 tablet by mouth Every 6 (Six) Hours As Needed for Moderate Pain.  Dispense: 25 tablet; Refill: 0  -     Comprehensive Metabolic Panel;  Future  -     CBC & Differential; Future  -     Hemoglobin A1c; Future  -     Urine Drug Screen - Urine, Clean Catch; Future    14. Chronic pain syndrome  -     HYDROcodone-acetaminophen (Norco) 5-325 MG per tablet; Take 1 tablet by mouth Every 6 (Six) Hours As Needed for Moderate Pain.  Dispense: 25 tablet; Refill: 0  -     Comprehensive Metabolic Panel; Future  -     CBC & Differential; Future  -     Hemoglobin A1c; Future  -     Urine Drug Screen - Urine, Clean Catch; Future        Leg pain, arterial evaluation----July 9, 2024 shows the right EDEL is normal and the left EDEL is normal with normal digital pressures normal study    Vision changes double vision, will refer to dr Andrade neurology for second opinion, MRI of the brain March 19, 2024 shows no acute intracranial process small vessel chronic ischemic changes otherwise patient stopped gabapentin because she thought it was causing her double vision, did go see dr Andrade told them there was nothing he could do for them, September 26, 2024    Restless legs,--continues  requip 3 mg tid and 10 mg qhs and prn hydocodone , ---Status post IV iron June 6 through June 13, 2023 with Venofer      Anemia iron deficiency-, resolved issue normal labs January 2025---- finished IV iron Venofer June 2023, current iron level normal, hemoglobin much improved at 14 September 28, 2023     Peripheral neuropathy--- currently off gabapentin September 2024    Chronic back pain severe at times, makes her nauseated no appetite, has tried different medications pain medications injections without relief, has been to pain management in the past discussed treatment options going forward September 29, 2023 patient's been to [facility] [place] had injections done also, 2021 December we will get a send in Curbside for her due to chronic pain and worsening pain and upcoming trip September 29, 2023 I sent in Norco 5/3/2025 every 4 hours as needed #25 no refills-----patient  self-referred her to [facility] spine surgeon, and is going through physical therapy now considering surgery to repair or fix previous rods screws in her back, discussed June 6, 2024---------- [facility] has requested a CT scan of her C-spine// thoracic spine// lumbar spine through our local facility prior to her going to their facility for possible surgical consultation, discussion about getting a pain pump September 26, 2024, we can put a referral in for pain management and patient's family can check on places in [place]----discussion about pain medications again January 27, 2025, urine drug screen will need to be completed     Chest pains,  cardiac stress test August 23, 2022, ---showed normal left ventricle size ejection fraction 70% no wall motion abnormalities low risk myocardial perfusion study, with Lexiscan, echocardiogram September 9, 2022 showed normal ejection fraction 68% posterior mitral valve leaflet thickening chordal Gavino was noted, diastolic dysfunction with mild septal asymmetric hypertrophy, chest x-ray no active disease August 27, 2022     Type 2 diabetes hemoglobin A1c -,= 5.6, January 2025, urine microalbumin slightly elevated normal, September 6, 2024    Vitamin B12 deficiency recommend B12 replacement daily, June 6, 2024     left foot pain -----------arterial evaluation March 15, 2022 shows EDEL left and right to be normal,--- repeat arterial evaluation July 9, 2024 showed normal ABIs on the right and the left,    Tobacco abuse, discussed, recommend quitting, 6/20/2024, and again January 2025     Previous left total knee arthroplasty, laparoscopic cholecystectomy March 2016, previous colonoscopies polypectomies, right breast biopsy, right hip surgery, ===lap band surgery, ===ADRIANO, recent back surgery July 2018,     Mixed hyperlipidemia discussed improvement in LDL down to 70 if possible September 2023,--- patient stopped Crestor      Chronic pain, continue Cymbalta, 60 mg daily      Depression anxiety, continues Cymbalta 60 mg daily     Previous pelvic fractures     Diverticulosis and diverticulitis in September 2015     small bowel enteritis small bowel perforation--- resolved 2016     Kidney stones     Alcohol use in the past,--has not drank in greater than 4 month as of March 2023--- says she does not drink anymore as of June 2024,     ERCP Common bile duct stone cholelithiasis choledocholithiasis with stent placement and sphincterotomy February 29, 2016, status post laparoscopic cholecystectomy Dr. Huizar     Paroxysmal atrial fibrillation May 2019 with RVR, clinically in sinus rhythm     Hypertension, continues metoprolol XL 25 mg daily,     Obstructive sleep apnea, continue CPAP     Chronic lower extremity edema,  stopped taking Lasix 40 mg daily as of September 2023 ---- edema has improved in her legs,  BR NP levels are extremely low at 89, March 2023 ------ recommend compression devices rather than more diuretics given the kidney numbers and heart numbers being good, November 2022             Vitamin D deficiency now over replaced recommend decreasing dose September 2024               Follow Up   No follow-ups on file.  Patient was given instructions and counseling regarding her condition or for health maintenance advice. Please see specific information pulled into the AVS if appropriate.

## 2025-01-27 NOTE — ASSESSMENT & PLAN NOTE
Hypertension is stable and controlled  Continue current treatment regimen.  Blood pressure will be reassessed in 6 months.    Orders:    HYDROcodone-acetaminophen (Norco) 5-325 MG per tablet; Take 1 tablet by mouth Every 6 (Six) Hours As Needed for Moderate Pain.    Comprehensive Metabolic Panel; Future    CBC & Differential; Future    Hemoglobin A1c; Future    Urine Drug Screen - Urine, Clean Catch; Future

## 2025-01-27 NOTE — ASSESSMENT & PLAN NOTE
Lipid abnormalities are stable    Plan:  Continue same medication/s without change.      Discussed medication dosage, use, side effects, and goals of treatment in detail.    Counseled patient on lifestyle modifications to help control hyperlipidemia.     Patient Treatment Goals:   LDL goal is less than 70    Followup in 6 months.    Orders:    HYDROcodone-acetaminophen (Norco) 5-325 MG per tablet; Take 1 tablet by mouth Every 6 (Six) Hours As Needed for Moderate Pain.    Comprehensive Metabolic Panel; Future    CBC & Differential; Future    Hemoglobin A1c; Future    Urine Drug Screen - Urine, Clean Catch; Future

## 2025-01-27 NOTE — ASSESSMENT & PLAN NOTE
Orders:    HYDROcodone-acetaminophen (Norco) 5-325 MG per tablet; Take 1 tablet by mouth Every 6 (Six) Hours As Needed for Moderate Pain.    Comprehensive Metabolic Panel; Future    CBC & Differential; Future    Hemoglobin A1c; Future    Urine Drug Screen - Urine, Clean Catch; Future

## 2025-02-18 ENCOUNTER — OFFICE VISIT (OUTPATIENT)
Dept: NEUROSURGERY | Facility: CLINIC | Age: 80
End: 2025-02-18
Payer: MEDICARE

## 2025-02-18 VITALS
HEIGHT: 59 IN | DIASTOLIC BLOOD PRESSURE: 78 MMHG | BODY MASS INDEX: 32.04 KG/M2 | SYSTOLIC BLOOD PRESSURE: 136 MMHG | WEIGHT: 158.9 LBS

## 2025-02-18 DIAGNOSIS — M96.1 FAILED BACK SURGICAL SYNDROME: Primary | ICD-10-CM

## 2025-02-18 NOTE — PROGRESS NOTES
"Chief Complaint  Back Pain    Subjective            Ivania Mattson who is a 79 y.o. year old female who presents to [facility] for Evaluation of the Spine.       History of Present Illness  The patient is an 79-year-old female who presents for evaluation of back pain.    She reports persistent back pain, which has been exacerbated following a multilevel lumbar fusion surgery performed 6.5 years ago. Despite undergoing physical therapy, back injections, and taking pain medications, her symptoms have not improved. She has expressed interest in a pain pump as a potential treatment option, but her insurance requires clearance from our department before proceeding. She also reports the presence of knots on her spine at the surgical site, which are highly sensitive to touch. She has no history of leg pain. She has sought consultation regarding the pain pump at the [facility] [place]. She has been prescribed hydrocodone for pain management but uses it sparingly due to concerns about addiction and its limited efficacy in her case. She has also tried acupuncture and has completed three sessions of physical therapy, all without significant relief.    Supplemental Information  She has a history of left knee replacement and has a lap band in place.    SOCIAL HISTORY  The patient admits to smoking.    MEDICATIONS  Current: Hydrocodone    This patient  reports that she has been smoking cigarettes. She started smoking about 22 years ago. She has a 10 pack-year smoking history. She has been exposed to tobacco smoke. She has never used smokeless tobacco.    Review of Systems   Musculoskeletal:  Positive for back pain.        Objective   Vital Signs:   /78 (BP Location: Left arm, Patient Position: Sitting)   Ht 149.9 cm (59.02\")   Wt 72.1 kg (158 lb 14.4 oz)   BMI 32.07 kg/m²       Physical Exam  Constitutional:       Comments: BMI 32   Neurological:      Mental Status: She is " alert.      Sensory: No sensory deficit.      Motor: No weakness.   Psychiatric:         Mood and Affect: Mood normal.        Neurological Exam  Mental Status  Alert.      Result Review :   I personally interpreted the patient's CT scan with the patient.     She has had a T12-L5 fusion. She has scoliosis above the fusion.      Assessment and Plan    Diagnoses and all orders for this visit:    1. Failed back surgical syndrome (Primary)      Assessment & Plan  1. Failed back syndrome.  She continues to experience back pain despite undergoing significant spinal surgery. The success rate of multilevel fusions for lower back pain is not as high as desired. Given her age and smoking habit, another spinal surgery is unlikely to be beneficial. The presence of scar tissue may pose a challenge for the placement of a spinal stimulator or pain pump. However, these are potential options for consideration. A spinal stimulator may not be as effective due to the absence of radiculopathy. A pain pump could potentially provide more relief, but it would require regular refills and carries the risk of infection. She has an appointment scheduled with the [facility] [place] to discuss the pain pump further. She was advised to consider increasing the dosage or frequency of her hydrocodone intake to assess its effectiveness in managing her pain. I don't feel there are any spine surgery options to help alleviate her pain.          Follow Up   No follow-ups on file.  Patient was given instructions and counseling regarding her condition or for health maintenance advice. Please see specific information pulled into the AVS if appropriate.     Patient or patient representative verbalized consent for the use of Ambient Listening during the visit with  Collins Rqoue MD for chart documentation. 2/18/2025  15:29 EST

## 2025-02-21 ENCOUNTER — PATIENT ROUNDING (BHMG ONLY) (OUTPATIENT)
Dept: NEUROSURGERY | Facility: CLINIC | Age: 80
End: 2025-02-21
Payer: MEDICARE

## 2025-04-21 RX ORDER — ROPINIROLE 3 MG/1
3 TABLET, FILM COATED ORAL 3 TIMES DAILY
Qty: 270 TABLET | Refills: 3 | Status: SHIPPED | OUTPATIENT
Start: 2025-04-21

## 2025-05-27 ENCOUNTER — TRANSCRIBE ORDERS (OUTPATIENT)
Dept: PHYSICAL THERAPY | Facility: CLINIC | Age: 80
End: 2025-05-27
Payer: MEDICARE

## 2025-05-27 DIAGNOSIS — M96.1 POST LAMINECTOMY SYNDROME: Primary | ICD-10-CM

## 2025-06-16 ENCOUNTER — TREATMENT (OUTPATIENT)
Dept: PHYSICAL THERAPY | Facility: CLINIC | Age: 80
End: 2025-06-16
Payer: MEDICARE

## 2025-06-16 DIAGNOSIS — Z74.09 IMPAIRED FUNCTIONAL MOBILITY, BALANCE, GAIT, AND ENDURANCE: ICD-10-CM

## 2025-06-16 DIAGNOSIS — M54.50 CHRONIC BILATERAL LOW BACK PAIN, UNSPECIFIED WHETHER SCIATICA PRESENT: Primary | ICD-10-CM

## 2025-06-16 DIAGNOSIS — G89.29 CHRONIC BILATERAL LOW BACK PAIN, UNSPECIFIED WHETHER SCIATICA PRESENT: Primary | ICD-10-CM

## 2025-06-16 DIAGNOSIS — R29.898 WEAKNESS OF BOTH LOWER EXTREMITIES: ICD-10-CM

## 2025-06-16 NOTE — PROGRESS NOTES
Physical Therapy Initial Evaluation and Plan of Care  [address] [facility] [address] [place] [address]    Patient: Ivania Mattson   : 1945  Diagnosis/ICD-10 Code:  Chronic bilateral low back pain, unspecified whether sciatica present [M54.50, G89.29]  Referring practitioner: Julia Santiago*  Date of Initial Visit: 2025  Today's Date: 2025  Patient seen for 1 sessions           Subjective Questionnaire: Oswestry:       Subjective Evaluation    History of Present Illness  Mechanism of injury: Pt presents to PT with reports of lumbar pain. Pt reports she has a chronic history of low back pain. Pt reports she had a lumbar fusion 6 years ago. Pt reports she had a pain pump placed a month ago. Pt reports since having pain pump placed she feels like she has gotten weaker. Pt ambulates into clinic with tripod walker of which she has used for six years. Pt reports she has increase in pain with prolonged walking and standing. Pt reports she is unable to stand upright at this time due to weakness. Pt reports her endurance has also decreased. Pt presents to PT with her daughter who drove her to PT.       Patient Occupation: Retired Pain  Current pain ratin  At best pain ratin  At worst pain ratin  Quality: discomfort  Relieving factors: medications  Aggravating factors: standing, movement, ambulation and prolonged positioning    Patient Goals  Patient goals for therapy: increased strength, improved balance, independence with ADLs/IADLs, increased motion and decreased pain  Patient goal: Walk without walker           Objective          Static Posture     Head  Forward.    Shoulders  Rounded.    Scapulae  Left protracted and right protracted.    Comments  Kyphotic posture    Palpation     Additional Palpation Details  Pain pump is placed on R lower spine. Increase in pain with palpation to incision of pain pump. Incision is closed and healed.     Strength/Myotome Testing     Left Hip    Planes of Motion   Flexion: 4-  Abduction: 4-  Adduction: 4-    Right Hip   Planes of Motion   Flexion: 4-  Abduction: 4-  Adduction: 4-    Left Knee   Flexion: 4  Extension: 4    Right Knee   Flexion: 4  Extension: 4    Left Ankle/Foot   Dorsiflexion: 4    Right Ankle/Foot   Dorsiflexion: 4    Ambulation     Observational Gait     Additional Observational Gait Details  Pt ambulates with tripod rolling walker with significant forward flexed posture, decrease in gait speed and decrease in stride length.    Functional Assessment     Comments  5 time STS: 24 seconds with no UE - used back of LE's to help lift off of table             See Exercise, Manual, and Modality Logs for complete treatment.       Assessment & Plan       Assessment  Impairments: abnormal gait, abnormal or restricted ROM, activity intolerance, impaired balance, impaired physical strength, lacks appropriate home exercise program, pain with function, safety issue and weight-bearing intolerance   Functional limitations: lifting, walking, uncomfortable because of pain, sitting and standing   Assessment details: Pt presents to PT s/p pain pump being placed in lumbar spine. Pt has decrease in B LE strength, decrease in balance, increase in pain with palpation to incision, decrease in endurance, and decrease in functional mobility. Pt requires skilled PT in order to address deficits listed to return patient back to maximum function such as walking without an AD and improving balance. Initiated patient's HEP and patient tolerated well. Will progress patient as able.   Prognosis: good    Goals  Plan Goals: LUMBAR PROBLEMS:    1. The patient has complaints of pain.    LTG 1: 12 weeks: The patient will report lower back pain no greater than 1/10 in order to improve tolerance with activities of daily living and improve sleep quality.    STATUS: New    STG 1a: 6 weeks: The patient will report lower back pain no greater than 3/10.     STATUS: New    2.The  patient demonstrates weakness of the bilateral hip.    LTG 2: 12 weeks: The patient will demonstrate 5/5 strength for bilateral hip flexion, abduction, and extension in order to improve hip stability.    STATUS: New      STG 2: 6 weeks: The patient will demonstrate 4/5 strength for bilateral hip flexion, abduction, and extension.    STATUS: New      Mobility: Walking/Moving Around Functional Limitation    LTG 3: 12 weeks: The patient will demonstrate 1-19% limitation by achieving a score of 8 on the Oswestry Disability Questionnaire.    STATUS: New    STG 3a: 6 weeks: The patient will demonstrate 20-39% limitation by achieving a score of 14 on the Oswestry Disability Quiestionnaire.    STATUS: New      4. Mobility: Walking/Moving Around Functional Limitation      LTG 4: 12 weeks:  The patient will demonstrate TUG score <14 seconds for reduced fall risk.       STATUS:  New     STG 4: 6 weeks:  The patient will demonstrate TUG score <18 seconds for reduced fall risk.       STATUS:  New      Plan  Therapy options: will be seen for skilled therapy services  Planned modality interventions: cryotherapy and thermotherapy (hydrocollator packs)  Planned therapy interventions: manual therapy, neuromuscular re-education, ADL retraining, balance/weight-bearing training, flexibility, functional ROM exercises, gait training, home exercise program, joint mobilization, soft tissue mobilization, strengthening, stretching, therapeutic activities, abdominal trunk stabilization, postural training, spinal/joint mobilization and IADL retraining  Frequency: 2x week  Duration in weeks: 12  Treatment plan discussed with: patient        History # of Personal Factors and/or Comorbidities: MODERATE (1-2)  Examination of Body System(s): # of elements: MODERATE (3)  Clinical Presentation: STABLE   Clinical Decision Making: MODERATE      Timed:         Manual Therapy:    0     mins  29706;     Therapeutic Exercise:    10     mins  16501;      Neuromuscular Sven:    0    mins  29405;    Therapeutic Activity:     0     mins  75453;     Gait Trainin     mins  45058;     Ultrasound:     0     mins  65485;    Ionto                               0    mins   10066  Self pay                         0     mins [id number]    Un-Timed:  Electrical Stimulation:    0     mins  75950 (MC )  Traction     0     mins 79217  Low Eval     0     Mins  84713  Mod Eval     25     Mins  55333  High Eval                       0     Mins  99249  Self Pay Eval                 0     PTSP1   Re-Eval                           0    mins  56990        Timed Treatment:   10   mins   Total Treatment:     35   mins    PT SIGNATURE: Electronically signed by Ervin Wren PT  [place] LICENSE: [id number]    DATE TREATMENT INITIATED: 2025    Initial Certification  Certification Period: 2025 thru 2025  I certify that the therapy services are furnished while this patient is under my care.  The services outlined above are required by this patient, and will be reviewed every 90 days.     PHYSICIAN: Julia Alva APRN   NPI: [id number]      DATE:     Please sign and return via fax to [phone] Thank you, [facility].

## 2025-06-24 ENCOUNTER — TREATMENT (OUTPATIENT)
Dept: PHYSICAL THERAPY | Facility: CLINIC | Age: 80
End: 2025-06-24
Payer: MEDICARE

## 2025-06-24 DIAGNOSIS — M54.50 CHRONIC BILATERAL LOW BACK PAIN, UNSPECIFIED WHETHER SCIATICA PRESENT: Primary | ICD-10-CM

## 2025-06-24 DIAGNOSIS — Z74.09 IMPAIRED FUNCTIONAL MOBILITY, BALANCE, GAIT, AND ENDURANCE: ICD-10-CM

## 2025-06-24 DIAGNOSIS — G89.29 CHRONIC BILATERAL LOW BACK PAIN, UNSPECIFIED WHETHER SCIATICA PRESENT: Primary | ICD-10-CM

## 2025-06-24 DIAGNOSIS — R29.898 WEAKNESS OF BOTH LOWER EXTREMITIES: ICD-10-CM

## 2025-06-24 PROCEDURE — 97110 THERAPEUTIC EXERCISES: CPT | Performed by: PHYSICAL THERAPIST

## 2025-06-24 PROCEDURE — 97530 THERAPEUTIC ACTIVITIES: CPT | Performed by: PHYSICAL THERAPIST

## 2025-06-24 NOTE — PROGRESS NOTES
Physical Therapy Daily Treatment Note                      [facility] [address] [place] [address]    Patient: Ivania Mattson   : 1945  Diagnosis/ICD-10 Code:  Chronic bilateral low back pain, unspecified whether sciatica present [M54.50, G89.29]  Referring practitioner: Julia Santiago*  Date of Initial Visit: Type: THERAPY  Noted: 2025  Today's Date: 2025  Patient seen for 2 sessions           Subjective   The patient reported that her back pain isn't bad today thanks to her pain pump. She just got it put in about 2.5 weeks ago.    Objective   See Exercise, Manual, and Modality Logs for complete treatment.     Assessment/Plan    The patient demonstrated good tolerance to all functional lower extremity and core strengthening exercise. Continue to progress with current PT plan of care per patient tolerance.         Timed:  Manual Therapy:    0     mins  01368;  Therapeutic Exercise:    15     mins  19951;     Neuromuscular Sven:   0    mins  83347;    Therapeutic Activity:     15     mins  63340;     Gait Trainin     mins  70312;     Aquatics                         0      mins  44637    Un-timed:  Mechanical Traction      0     mins  39813  Dry Needling     0     mins self-pay  Electrical Stimulation:    0     mins  27322 ( );      Timed Treatment:   30   mins   Total Treatment:     30   mins    Zac Hernandez PT  Physical Therapist    Electronically signed 2025    [place] License: PT - [id number]

## 2025-06-26 ENCOUNTER — TREATMENT (OUTPATIENT)
Dept: PHYSICAL THERAPY | Facility: CLINIC | Age: 80
End: 2025-06-26
Payer: MEDICARE

## 2025-06-26 DIAGNOSIS — G89.29 CHRONIC BILATERAL LOW BACK PAIN, UNSPECIFIED WHETHER SCIATICA PRESENT: Primary | ICD-10-CM

## 2025-06-26 DIAGNOSIS — R29.898 WEAKNESS OF BOTH LOWER EXTREMITIES: ICD-10-CM

## 2025-06-26 DIAGNOSIS — M54.50 CHRONIC BILATERAL LOW BACK PAIN, UNSPECIFIED WHETHER SCIATICA PRESENT: Primary | ICD-10-CM

## 2025-06-26 DIAGNOSIS — Z74.09 IMPAIRED FUNCTIONAL MOBILITY, BALANCE, GAIT, AND ENDURANCE: ICD-10-CM

## 2025-06-26 NOTE — PROGRESS NOTES
Physical Therapy Daily Treatment Note  [address], [address] [place] [address]        Patient: Ivania Mattson   : 1945  Diagnosis/ICD-10 Code:  Chronic bilateral low back pain, unspecified whether sciatica present [M54.50, G89.29]  Referring practitioner: Julia Santiago*  Date of Initial Visit: Type: THERAPY  Noted: 2025  Today's Date: 2025  Patient seen for 3 sessions             Subjective     Ivania Mattson denies having any back pain upon arrival today.    Objective     Decreased Standing Tolerance:  approximately 4 min during standing Exercises -with use of UE support of rails.  Pt reeported fatigue and mild increased discomfort in back with use of UE support of rails.    See Exercise Logs for complete treatment.       Assessment/Plan    Pt tolerated therapy session moderately well - with progression of therapeutic exercises and  CKC-Functional activities.  She has improved, but continues to have deficits in Her Trunk-Core and Bilateral Lower Extremity ROM,  Strength, and Stability; limiting functional mobility and ability to perform ADLs without increased pain or difficulty at this time.  Pt continues to require verbal and tactile cues to improve posture and form during exercise performance and with gait.  Pt appears to be tolerating  functional gait and Trunk postural stabilization progression moderately well and denied having  any increased back pain  post session today.     Progress per Plan of Care - as tolerated.               Timed:  Manual Therapy:    0     mins  44954;  Therapeutic Exercise:    15     mins  88609;     Neuromuscular Sven:    8    mins  01253;    Therapeutic Activity:     15     mins  54055;     Gait Trainin     mins  26495;     Ultrasound:     0     mins  18464;    Electrical Stimulation:    0     mins  04812;  Iontophoresis     0     mins  25047    Untimed:  Electrical Stimulation:    0     mins  16764 ( );  Mechanical Traction:    0      mins  80536;   Fluidotherapy     0     mins  79940  Hot pack     0     mins  06668  Cold pack     0     mins  82105    Timed Treatment:   38   mins   Total Treatment:     38   mins        Lauren Oenal PTA  Physical Therapist Assistant

## 2025-07-01 ENCOUNTER — TREATMENT (OUTPATIENT)
Dept: PHYSICAL THERAPY | Facility: CLINIC | Age: 80
End: 2025-07-01
Payer: MEDICARE

## 2025-07-01 DIAGNOSIS — R29.898 WEAKNESS OF BOTH LOWER EXTREMITIES: ICD-10-CM

## 2025-07-01 DIAGNOSIS — G89.29 CHRONIC BILATERAL LOW BACK PAIN, UNSPECIFIED WHETHER SCIATICA PRESENT: Primary | ICD-10-CM

## 2025-07-01 DIAGNOSIS — M54.50 CHRONIC BILATERAL LOW BACK PAIN, UNSPECIFIED WHETHER SCIATICA PRESENT: Primary | ICD-10-CM

## 2025-07-01 DIAGNOSIS — Z74.09 IMPAIRED FUNCTIONAL MOBILITY, BALANCE, GAIT, AND ENDURANCE: ICD-10-CM

## 2025-07-01 NOTE — PROGRESS NOTES
Physical Therapy Daily Treatment Note  [address], [address] [facility] [place] [address]        Patient: Ivania Mattson   : 1945  Diagnosis/ICD-10 Code:  Chronic bilateral low back pain, unspecified whether sciatica present [M54.50, G89.29]  Referring practitioner: Julia Santiago*  Date of Initial Visit: Type: THERAPY  Noted: 2025  Today's Date: 2025  Patient seen for 4 sessions             Subjective   Ivania Mattson voiced no new complaints.  She reports that exercises are going well at home.  She denies having any pain upon arrival today.    Objective       Standing Tolerance:  approximately 5 min during standing Exercises -with use of UE support of rails.  Pt reeported fatigue and mild increased discomfort in back with use of UE support of rails.     See Exercise  Logs for complete treatment.       Assessment/Plan    Pt tolerated therapy session moderately well - with progression of therapeutic exercises and  CKC-Functional activities.  She has improved, but continues to have deficits in Her Trunk-Core and Bilateral Lower Extremity ROM,  Strength, and Stability; limiting functional mobility and ability to perform ADLs without increased pain or difficulty at this time.  Pt continues to require verbal and tactile cues to improve posture and form during exercise performance and with gait.  Pt appears to be tolerating  functional gait and Trunk postural stabilization progression moderately well and denied having  any increased back pain  post session today.     Progress per Plan of Care - as tolerated.               Timed:  Manual Therapy:    0     mins  53960;  Therapeutic Exercise:    15     mins  00662;     Neuromuscular Sven:    8    mins  95849;    Therapeutic Activity:     15     mins  27927;     Gait Trainin     mins  09610;     Ultrasound:     0     mins  12387;    Electrical Stimulation:    0     mins  50342;  Iontophoresis     0     mins  40287    Untimed:  Electrical  Stimulation:    0     mins  85996 ( );  Mechanical Traction:    0     mins  47254;   Fluidotherapy     0     mins  60584  Hot pack     0     mins  91644  Cold pack     0     mins  52647    Timed Treatment:   38   mins   Total Treatment:     38   mins        Lauren Oneal PTA  Physical Therapist Assistant

## 2025-07-03 ENCOUNTER — TREATMENT (OUTPATIENT)
Dept: PHYSICAL THERAPY | Facility: CLINIC | Age: 80
End: 2025-07-03
Payer: MEDICARE

## 2025-07-03 DIAGNOSIS — G89.29 CHRONIC BILATERAL LOW BACK PAIN, UNSPECIFIED WHETHER SCIATICA PRESENT: Primary | ICD-10-CM

## 2025-07-03 DIAGNOSIS — R29.898 WEAKNESS OF BOTH LOWER EXTREMITIES: ICD-10-CM

## 2025-07-03 DIAGNOSIS — M54.50 CHRONIC BILATERAL LOW BACK PAIN, UNSPECIFIED WHETHER SCIATICA PRESENT: Primary | ICD-10-CM

## 2025-07-03 DIAGNOSIS — Z74.09 IMPAIRED FUNCTIONAL MOBILITY, BALANCE, GAIT, AND ENDURANCE: ICD-10-CM

## 2025-07-03 NOTE — PROGRESS NOTES
Physical Therapy Daily Treatment Note  [address], [address] [place] [address]        Patient: Ivania Mattson   : 1945  Diagnosis/ICD-10 Code:  Chronic bilateral low back pain, unspecified whether sciatica present [M54.50, G89.29]  Referring practitioner: Julia Santiago*  Date of Initial Visit: Type: THERAPY  Noted: 2025  Today's Date: 7/3/2025  Patient seen for 5 sessions             Subjective     Ivania Mattson denies having any pain upon arrival.  She reports that she continues to have pain relief since receiving her Pump, but continues to be unable to stand for more than a few minutes at a time.    Objective       See Exercise Logs for complete treatment.       Assessment/Plan    Pt tolerated therapy session moderately well - with progression of therapeutic exercises and  CKC-Functional activities.  She has improved, but continues to have deficits in Her Trunk-Core and Bilateral Lower Extremity ROM,  Strength, and Stability; limiting functional mobility and ability to perform ADLs without increased pain or difficulty at this time.  Pt continues to require verbal and tactile cues to improve posture and form during exercise performance and with gait.  Pt appears to be tolerating  functional gait and Trunk postural stabilization progression moderately well and denied having  any increased back pain  post session today.     Progress per Plan of Care - as tolerated.                 Timed:  Manual Therapy:    0     mins  23822;  Therapeutic Exercise:    15     mins  61412;     Neuromuscular Sven:    8    mins  70535;    Therapeutic Activity:     15     mins  96915;     Gait Trainin     mins  64234;     Ultrasound:     0     mins  07270;    Electrical Stimulation:    0     mins  53133;  Iontophoresis     0     mins  09264    Untimed:  Electrical Stimulation:    0     mins  55365 ( );  Mechanical Traction:    0     mins  51462;   Fluidotherapy     0     mins  51708  Hot  pack     0     mins  17129  Cold pack     0     mins  04170    Timed Treatment:   38   mins   Total Treatment:     38   mins        Lauren Oneal PTA  Physical Therapist Assistant

## 2025-07-15 ENCOUNTER — TREATMENT (OUTPATIENT)
Dept: PHYSICAL THERAPY | Facility: CLINIC | Age: 80
End: 2025-07-15
Payer: MEDICARE

## 2025-07-15 DIAGNOSIS — R29.898 WEAKNESS OF BOTH LOWER EXTREMITIES: ICD-10-CM

## 2025-07-15 DIAGNOSIS — G89.29 CHRONIC BILATERAL LOW BACK PAIN, UNSPECIFIED WHETHER SCIATICA PRESENT: Primary | ICD-10-CM

## 2025-07-15 DIAGNOSIS — M54.50 CHRONIC BILATERAL LOW BACK PAIN, UNSPECIFIED WHETHER SCIATICA PRESENT: Primary | ICD-10-CM

## 2025-07-15 DIAGNOSIS — Z74.09 IMPAIRED FUNCTIONAL MOBILITY, BALANCE, GAIT, AND ENDURANCE: ICD-10-CM

## 2025-07-15 NOTE — PROGRESS NOTES
Physical Therapy Daily Treatment Note  [address] [facility], [address], [place] [address]      Patient: Ivania Mattson   : 1945  Diagnosis/ICD-10 Code:  Chronic bilateral low back pain, unspecified whether sciatica present [M54.50, G89.29]  Referring practitioner: Julia Santiago*  Date of Initial Visit: Type: THERAPY  Noted: 2025  Today's Date: 7/15/2025  Patient seen for 6 sessions             Subjective   Ivania Mattson reports: no new complaints today.    Objective   See Exercise, Manual, and Modality Logs for complete treatment.       Assessment/Plan  Patient tolerated all exercise progressions well today but continues to demonstrate bilateral LE strength deficits limiting function. Continue to progress per patient tolerance.    Progress per Plan of Care           Timed:  Manual Therapy:    0     mins  29082;  Therapeutic Exercise:    22     mins  74223;     Neuromuscular Sven:    0    mins  60691;    Therapeutic Activity:     8     mins  98261;     Gait Trainin     mins  70936;     Ultrasound:     0     mins  91622;    Electrical Stimulation:    0     mins  60684;  Iontophoresis     0     mins  72464    Untimed:  Electrical Stimulation:    0     mins  26264 ( );  Mechanical Traction:    0     mins  09737;   Fluidotherapy     0     mins  09398  Hot pack     0     Mins    Cold pack                       0     Mins     Timed Treatment:   30   mins   Total Treatment:     30   mins        Kelly Hernandez PT    Electronically signed [unfilled]  [place] License: [id number]  NPI number: [id number]

## 2025-07-17 ENCOUNTER — TELEPHONE (OUTPATIENT)
Dept: PHYSICAL THERAPY | Facility: CLINIC | Age: 80
End: 2025-07-17

## 2025-07-17 NOTE — TELEPHONE ENCOUNTER
Caller: [facility]    Relationship: Emergency Contact      What was the call regarding: UNABLE TO MAKE THE APPOINTMENT

## 2025-07-23 RX ORDER — DULOXETIN HYDROCHLORIDE 60 MG/1
60 CAPSULE, DELAYED RELEASE ORAL NIGHTLY
Qty: 90 CAPSULE | Refills: 3 | Status: SHIPPED | OUTPATIENT
Start: 2025-07-23

## 2025-07-24 ENCOUNTER — TREATMENT (OUTPATIENT)
Dept: PHYSICAL THERAPY | Facility: CLINIC | Age: 80
End: 2025-07-24
Payer: MEDICARE

## 2025-07-24 DIAGNOSIS — Z74.09 IMPAIRED FUNCTIONAL MOBILITY, BALANCE, GAIT, AND ENDURANCE: ICD-10-CM

## 2025-07-24 DIAGNOSIS — G89.29 CHRONIC BILATERAL LOW BACK PAIN, UNSPECIFIED WHETHER SCIATICA PRESENT: Primary | ICD-10-CM

## 2025-07-24 DIAGNOSIS — M54.50 CHRONIC BILATERAL LOW BACK PAIN, UNSPECIFIED WHETHER SCIATICA PRESENT: Primary | ICD-10-CM

## 2025-07-24 DIAGNOSIS — R29.898 WEAKNESS OF BOTH LOWER EXTREMITIES: ICD-10-CM

## 2025-07-24 NOTE — PROGRESS NOTES
Re-Assessment / Re-Certification  [address] [place] [address]      Patient: Ivania Mattson   : 1945  Diagnosis/ICD-10 Code:  Chronic bilateral low back pain, unspecified whether sciatica present [M54.50, G89.29]  Referring practitioner: Julia Santiago*  Date of Initial Visit: No linked episodes  Today's Date: 2025  Patient seen for Visit count could not be calculated. Make sure you are using a visit which is associated with an episode. sessions    Progress note due: 2025  Re-cert due: 10/22/2025      Subjective:   Ivania Mattson reports: she continues to have lower back pain.  Pt reports continued difficulty with prolonged walking, standing activities due to pain and weakness.  Subjective Questionnaire: Oswestry: 14      Subjective   Objective     TU seconds with rolling walker    Strength/Myotome Testing      Left Hip   Planes of Motion   Flexion: 4  Abduction: 4-  Adduction: 4-     Right Hip   Planes of Motion   Flexion: 4  Abduction: 4-  Adduction: 4-     Left Knee   Flexion: 4  Extension: 4     Right Knee   Flexion: 4  Extension: 4     Left Ankle/Foot   Dorsiflexion: 4+     Right Ankle/Foot   Dorsiflexion: 4+  Assessment & Plan       Assessment  Assessment details: Compared to initial evaluation the pt demonstrates improvements in bilateral hip flexion and ankle strength but continues to demonstrate functional weakness, decreased balance, increased falls risk (TUG) and reports of pain limiting function as shown on the NIYA.  Pt would continue to benefit from skilled PT services in order to address remaining deficits limiting function at this time.  30 sec sit to stand goal added today to further address functional strength.    Goals  Plan Goals: 1. The patient has complaints of pain.  LTG 1: 12 weeks: The patient will report lower back pain no greater than 1/10 in order to improve tolerance with activities of daily living and improve sleep quality.     STATUS:  ongoing     STG 1a: 6 weeks: The patient will report lower back pain no greater than 3/10.      STATUS: ongoing     2.The patient demonstrates weakness of the bilateral hip.     LTG 2: 12 weeks: The patient will demonstrate 5/5 strength for bilateral hip flexion, abduction, and extension in order to improve hip stability.     STATUS: ongoing        STG 2: 6 weeks: The patient will demonstrate 4/5 strength for bilateral hip flexion, abduction, and extension.     STATUS: ongoing        Mobility: Walking/Moving Around Functional Limitation     LTG 3: 12 weeks: The patient will demonstrate 1-19% limitation by achieving a score of 8 on the Oswestry Disability Questionnaire.     STATUS: ongoing     STG 3a: 6 weeks: The patient will demonstrate 20-39% limitation by achieving a score of 14 on the Oswestry Disability Quiestionnaire.     STATUS: ongoing        4. Mobility: Walking/Moving Around Functional Limitation                      LTG 4: 12 weeks:  The patient will demonstrate TUG score <14 seconds for reduced fall risk.                             STATUS:  ongoing                STG 4: 6 weeks:  The patient will demonstrate TUG score <18 seconds for reduced fall risk.                             STATUS:  ongoing    5. Mobility: Walking/Moving Around Functional Limitation                      LTG 5: 8 weeks:  The patient will complete 6 reps during 30 sec sit to stand test.                            STATUS:  new                STG 5: 6 weeks:  The patient will complete 3 reps during 30 sec sit to stand test.  STATUS:  new          Progress toward previous goals: Not Met      Recommendations: Continue as planned  Timeframe: 2 months  Prognosis to achieve goals: good    PT SIGNATURE: Lauren Oneal PTA     Electronically signed 7/24/2025  DATE TREATMENT INITIATED: 7/24/2025  [place] License: [id number]     Certification Period: 7/24/2025 thru 10/21/2025    Based upon review of the patient's progress and continued therapy plan, it  is my medical opinion that Ivania Mattson should continue physical therapy treatment at [facility]  [address] [facility] [address] [place] [id number] [phone].    Signature: __________________________________  Julia Alva APRN  NPI: [id number]    Timed:  Manual Therapy:    0     mins  88643;  Therapeutic Exercise:    0     mins  24061;     Neuromuscular Sven:    0    mins  43595;    Therapeutic Activity:     0     mins  06858;     Gait Trainin     mins  84204;     Ultrasound:     0     mins  55742;    Electrical Stimulation:    0     mins  24373 ( );    Untimed:  Electrical Stimulation:    0     mins  00039 ( );  Re-evaluation :    7     mins  74367;     Timed Treatment:   0   mins   Total Treatment:     7   mins

## 2025-07-24 NOTE — PROGRESS NOTES
"Physical Therapy Daily Treatment Note  [address], [address] [place] [address]        Patient: Ivania Mattson   : 1945  Diagnosis/ICD-10 Code:  Chronic bilateral low back pain, unspecified whether sciatica present [M54.50, G89.29]  Referring practitioner: Julia Santiago*  Date of Initial Visit: Type: THERAPY  Noted: 2025  Today's Date: 2025  Patient seen for 7 sessions             Subjective   Ivania Mattson reports having 4-5/10 pain in her mid/lower back- primarily on her right side near pump placement.  She reports that she had her daughter call office regarding \"Pump Refill\" to see if she was due refill.  Pt's daughter states they said she was not due for refill at this time.  Pt states that she has had more pain over the past week.  She continues to report having pain if she stands more than a minute or two at home.    Objective     Standing Tolerance :  2:30 min  During standing exercise performance.    See updated Progress Note/Reassessment by Physical Therapist this date.    See Exercise  Logs for complete treatment.       Assessment/Plan    Pt tolerated therapy session moderately well today with progression of therapeutic exercises and  CKC-Functional activities.  She has improved, but continues to have deficits in Her Trunk-Core and Bilateral Lower Extremity ROM,  Strength, and Stability; limiting functional mobility and ability to perform ADLs without increased pain or difficulty at this time.  Pt continues to require verbal and tactile cues to improve posture and form during exercise performance and with gait.  Pt appears to be tolerating  functional gait and Trunk postural stabilization progression moderately well; however, reported increased pain throughout therapy session today.     Progress per Plan of Care - as tolerated.             Timed:  Manual Therapy:    0     mins  77171;  Therapeutic Exercise:    15     mins  77920;     Neuromuscular Sven:    8    mins  " 56445;    Therapeutic Activity:     15     mins  46679;     Gait Trainin     mins  87094;     Ultrasound:     0     mins  64259;    Electrical Stimulation:    0     mins  94632;  Iontophoresis     0     mins  54271    Untimed:  Electrical Stimulation:    0     mins  85852 ( );  Mechanical Traction:    0     mins  12719;   Fluidotherapy     0     mins  50996  Hot pack     0     mins  00806  Cold pack     0     mins  31572    Timed Treatment:   38   mins   Total Treatment:     38   mins        Lauren Oneal PTA  Physical Therapist Assistant

## 2025-07-28 ENCOUNTER — TELEPHONE (OUTPATIENT)
Dept: INTERNAL MEDICINE | Age: 80
End: 2025-07-28

## 2025-07-28 NOTE — TELEPHONE ENCOUNTER
FYI:  Patient called the [facility] to cancel but rescheduled with them.  No call made from our office today but late cancel policy letter being sent.       Stefania - Jean Paul Rep

## 2025-07-29 ENCOUNTER — TREATMENT (OUTPATIENT)
Dept: PHYSICAL THERAPY | Facility: CLINIC | Age: 80
End: 2025-07-29
Payer: MEDICARE

## 2025-07-29 DIAGNOSIS — G89.29 CHRONIC BILATERAL LOW BACK PAIN, UNSPECIFIED WHETHER SCIATICA PRESENT: Primary | ICD-10-CM

## 2025-07-29 DIAGNOSIS — R29.898 WEAKNESS OF BOTH LOWER EXTREMITIES: ICD-10-CM

## 2025-07-29 DIAGNOSIS — Z74.09 IMPAIRED FUNCTIONAL MOBILITY, BALANCE, GAIT, AND ENDURANCE: ICD-10-CM

## 2025-07-29 DIAGNOSIS — M54.50 CHRONIC BILATERAL LOW BACK PAIN, UNSPECIFIED WHETHER SCIATICA PRESENT: Primary | ICD-10-CM

## 2025-07-29 NOTE — PROGRESS NOTES
Physical Therapy Daily Treatment Note  [address], [address] [place] [address]        Patient: Ivania Mattson   : 1945  Diagnosis/ICD-10 Code:  Chronic bilateral low back pain, unspecified whether sciatica present [M54.50, G89.29]  Referring practitioner: Julia Santiago*  Date of Initial Visit: Type: THERAPY  Noted: 2025  Today's Date: 2025  Patient seen for 8 sessions             Subjective     Ivania Mattson reports having 2-3/10 pain in her back upon arrival today.  She states that she had appointment at pain clinic prior to therapy session today.  She states they increased her pain medication by 10% in her pump.  She also reports that her  fell yesterday at home and states that he fell into her and caused her to fall.  She reports hitting her forehead and nose.  Pt's daughter reports that she had to take her to ER to get stitches in her forehead.    Objective       See Exercise Logs for complete treatment.       Assessment/Plan    Pt tolerated therapy session moderately well today with progression of therapeutic exercises and  [facility]-Functional activities.  She has improved, but continues to have deficits in Her Trunk-Core and Bilateral Lower Extremity ROM,  Strength, and Stability; limiting functional mobility and ability to perform ADLs without increased pain or difficulty at this time.  Pt continues to require verbal and tactile cues to improve posture and form during exercise performance and with gait.  Pt appears to be tolerating  functional gait and Trunk postural stabilization progression moderately well today.  She denied having any increased pain during or post session today.       Progress per Plan of Care - as tolerated.               Timed:  Manual Therapy:    0     mins  97060;  Therapeutic Exercise:    30     mins  58659;     Neuromuscular Sven:    8    mins  81515;    Therapeutic Activity:     15     mins  75776;     Gait Trainin     mins  44410;      Ultrasound:     0     mins  85369;    Electrical Stimulation:    0     mins  38105;  Iontophoresis     0     mins  18720    Untimed:  Electrical Stimulation:    0     mins  17720 ( );  Mechanical Traction:    0     mins  38860;   Fluidotherapy     0     mins  63337  Hot pack     0     mins  67576  Cold pack     0     mins  00268    Timed Treatment:   53   mins   Total Treatment:     53   mins        Lauren Oneal PTA  Physical Therapist Assistant

## 2025-07-31 ENCOUNTER — TREATMENT (OUTPATIENT)
Dept: PHYSICAL THERAPY | Facility: CLINIC | Age: 80
End: 2025-07-31
Payer: MEDICARE

## 2025-07-31 DIAGNOSIS — Z74.09 IMPAIRED FUNCTIONAL MOBILITY, BALANCE, GAIT, AND ENDURANCE: ICD-10-CM

## 2025-07-31 DIAGNOSIS — M54.50 CHRONIC BILATERAL LOW BACK PAIN, UNSPECIFIED WHETHER SCIATICA PRESENT: Primary | ICD-10-CM

## 2025-07-31 DIAGNOSIS — R29.898 WEAKNESS OF BOTH LOWER EXTREMITIES: ICD-10-CM

## 2025-07-31 DIAGNOSIS — G89.29 CHRONIC BILATERAL LOW BACK PAIN, UNSPECIFIED WHETHER SCIATICA PRESENT: Primary | ICD-10-CM

## 2025-07-31 NOTE — PROGRESS NOTES
Physical Therapy Daily Treatment Note  [email or web address], [address] [place] [address]        Patient: Ivania Mattson   : 1945  Diagnosis/ICD-10 Code:  Chronic bilateral low back pain, unspecified whether sciatica present [M54.50, G89.29]  Referring practitioner: Julia Santiago*  Date of Initial Visit: Type: THERAPY  Noted: 2025  Today's Date: 2025  Patient seen for 9 sessions             Subjective   Ivania Mattson reports that she feels tired today, but denies having any pain upon arrival.  She states that  her pain has been better since she had her pain pump medication increase earlier in the week.    Objective       See Exercise  Logs for complete treatment.       Assessment/Plan    Pt tolerated therapy session moderately well today with progression of therapeutic exercises and  [facility]-Functional activities.  She has improved, but continues to have deficits in Her Trunk-Core and Bilateral Lower Extremity ROM,  Strength, and Stability; limiting functional mobility and ability to perform ADLs without increased pain or difficulty at this time.  Pt continues to require verbal and tactile cues to improve posture and form during exercise performance and with gait.  Pt appears to be tolerating  functional gait and Trunk postural stabilization progression moderately well today.  She denied having any increased pain during or post session today.  Seated rest breaks given secondary to reports of fatigue and decreased functional tolerance.     Progress per Plan of Care - as tolerated.               Timed:  Manual Therapy:    0     mins  89401;  Therapeutic Exercise:    22     mins  50488;     Neuromuscular Sven:    8    mins  38064;    Therapeutic Activity:     13     mins  53448;     Gait Trainin     mins  89830;     Ultrasound:     0     mins  89407;    Electrical Stimulation:    0     mins  32514;  Iontophoresis     0     mins  03066    Untimed:  Electrical Stimulation:    0     mins   55192 ( );  Mechanical Traction:    0     mins  06163;   Fluidotherapy     0     mins  20188  Hot pack     0     mins  21853  Cold pack     0     mins  62602    Timed Treatment:   43   mins   Total Treatment:     43   mins        Lauren Oneal PTA  Physical Therapist Assistant

## 2025-08-05 ENCOUNTER — TELEPHONE (OUTPATIENT)
Dept: PHYSICAL THERAPY | Facility: CLINIC | Age: 80
End: 2025-08-05

## 2025-08-18 ENCOUNTER — TREATMENT (OUTPATIENT)
Dept: PHYSICAL THERAPY | Facility: CLINIC | Age: 80
End: 2025-08-18
Payer: MEDICARE

## 2025-08-18 DIAGNOSIS — Z74.09 IMPAIRED FUNCTIONAL MOBILITY, BALANCE, GAIT, AND ENDURANCE: ICD-10-CM

## 2025-08-18 DIAGNOSIS — G89.29 CHRONIC BILATERAL LOW BACK PAIN, UNSPECIFIED WHETHER SCIATICA PRESENT: Primary | ICD-10-CM

## 2025-08-18 DIAGNOSIS — R29.898 WEAKNESS OF BOTH LOWER EXTREMITIES: ICD-10-CM

## 2025-08-18 DIAGNOSIS — M54.50 CHRONIC BILATERAL LOW BACK PAIN, UNSPECIFIED WHETHER SCIATICA PRESENT: Primary | ICD-10-CM

## 2025-08-18 PROCEDURE — 97112 NEUROMUSCULAR REEDUCATION: CPT | Performed by: PHYSICAL THERAPIST

## 2025-08-18 PROCEDURE — 97110 THERAPEUTIC EXERCISES: CPT | Performed by: PHYSICAL THERAPIST

## 2025-08-18 PROCEDURE — 97530 THERAPEUTIC ACTIVITIES: CPT | Performed by: PHYSICAL THERAPIST

## 2025-08-20 ENCOUNTER — TREATMENT (OUTPATIENT)
Dept: PHYSICAL THERAPY | Facility: CLINIC | Age: 80
End: 2025-08-20
Payer: MEDICARE

## 2025-08-20 ENCOUNTER — OFFICE VISIT (OUTPATIENT)
Age: 80
End: 2025-08-20
Payer: MEDICARE

## 2025-08-20 VITALS
HEART RATE: 97 BPM | DIASTOLIC BLOOD PRESSURE: 78 MMHG | BODY MASS INDEX: 33.67 KG/M2 | OXYGEN SATURATION: 94 % | SYSTOLIC BLOOD PRESSURE: 112 MMHG | WEIGHT: 160.4 LBS | HEIGHT: 58 IN

## 2025-08-20 DIAGNOSIS — R19.7 DIARRHEA, UNSPECIFIED TYPE: ICD-10-CM

## 2025-08-20 DIAGNOSIS — H10.9 CONJUNCTIVITIS OF BOTH EYES, UNSPECIFIED CONJUNCTIVITIS TYPE: Primary | ICD-10-CM

## 2025-08-20 DIAGNOSIS — Z74.09 IMPAIRED FUNCTIONAL MOBILITY, BALANCE, GAIT, AND ENDURANCE: ICD-10-CM

## 2025-08-20 DIAGNOSIS — M54.50 CHRONIC BILATERAL LOW BACK PAIN, UNSPECIFIED WHETHER SCIATICA PRESENT: Primary | ICD-10-CM

## 2025-08-20 DIAGNOSIS — G89.29 CHRONIC BILATERAL LOW BACK PAIN, UNSPECIFIED WHETHER SCIATICA PRESENT: Primary | ICD-10-CM

## 2025-08-20 DIAGNOSIS — R29.898 WEAKNESS OF BOTH LOWER EXTREMITIES: ICD-10-CM

## 2025-08-20 DIAGNOSIS — K58.0 IRRITABLE BOWEL SYNDROME WITH DIARRHEA: ICD-10-CM

## 2025-08-20 PROCEDURE — 99213 OFFICE O/P EST LOW 20 MIN: CPT | Performed by: NURSE PRACTITIONER

## 2025-08-20 PROCEDURE — 3078F DIAST BP <80 MM HG: CPT | Performed by: NURSE PRACTITIONER

## 2025-08-20 PROCEDURE — 1160F RVW MEDS BY RX/DR IN RCRD: CPT | Performed by: NURSE PRACTITIONER

## 2025-08-20 PROCEDURE — 3074F SYST BP LT 130 MM HG: CPT | Performed by: NURSE PRACTITIONER

## 2025-08-20 PROCEDURE — 1125F AMNT PAIN NOTED PAIN PRSNT: CPT | Performed by: NURSE PRACTITIONER

## 2025-08-20 PROCEDURE — 1159F MED LIST DOCD IN RCRD: CPT | Performed by: NURSE PRACTITIONER

## 2025-08-20 RX ORDER — DIPHENOXYLATE HYDROCHLORIDE AND ATROPINE SULFATE 2.5; .025 MG/1; MG/1
1 TABLET ORAL 4 TIMES DAILY PRN
Qty: 10 TABLET | Refills: 0 | Status: SHIPPED | OUTPATIENT
Start: 2025-08-20

## 2025-08-20 RX ORDER — TOBRAMYCIN 3 MG/ML
2 SOLUTION/ DROPS OPHTHALMIC
Qty: 5 ML | Refills: 0 | Status: SHIPPED | OUTPATIENT
Start: 2025-08-20 | End: 2025-08-27

## 2025-08-25 ENCOUNTER — TREATMENT (OUTPATIENT)
Dept: PHYSICAL THERAPY | Facility: CLINIC | Age: 80
End: 2025-08-25
Payer: MEDICARE

## 2025-08-25 DIAGNOSIS — R29.898 WEAKNESS OF BOTH LOWER EXTREMITIES: ICD-10-CM

## 2025-08-25 DIAGNOSIS — G89.29 CHRONIC BILATERAL LOW BACK PAIN, UNSPECIFIED WHETHER SCIATICA PRESENT: Primary | ICD-10-CM

## 2025-08-25 DIAGNOSIS — M54.50 CHRONIC BILATERAL LOW BACK PAIN, UNSPECIFIED WHETHER SCIATICA PRESENT: Primary | ICD-10-CM

## 2025-08-25 DIAGNOSIS — Z74.09 IMPAIRED FUNCTIONAL MOBILITY, BALANCE, GAIT, AND ENDURANCE: ICD-10-CM

## 2025-08-25 PROCEDURE — 97110 THERAPEUTIC EXERCISES: CPT | Performed by: PHYSICAL THERAPIST

## 2025-08-25 PROCEDURE — 97530 THERAPEUTIC ACTIVITIES: CPT | Performed by: PHYSICAL THERAPIST

## 2025-08-25 PROCEDURE — 97112 NEUROMUSCULAR REEDUCATION: CPT | Performed by: PHYSICAL THERAPIST

## (undated) DEVICE — COLON KIT: Brand: MEDLINE INDUSTRIES, INC.

## (undated) DEVICE — Device: Brand: DEFENDO AIR/WATER/SUCTION AND BIOPSY VALVE

## (undated) DEVICE — SNAR E/S POLYP SNAREMASTER OVL/10MM 2.8X2300MM YEL

## (undated) DEVICE — THE SINGLE USE ETRAP – POLYP TRAP IS USED FOR SUCTION RETRIEVAL OF ENDOSCOPICALLY REMOVED POLYPS.: Brand: ETRAP

## (undated) DEVICE — SOL IRRG H2O PL/BG 1000ML STRL